# Patient Record
Sex: FEMALE | Race: WHITE | NOT HISPANIC OR LATINO | Employment: OTHER | URBAN - METROPOLITAN AREA
[De-identification: names, ages, dates, MRNs, and addresses within clinical notes are randomized per-mention and may not be internally consistent; named-entity substitution may affect disease eponyms.]

---

## 2017-03-14 DIAGNOSIS — F41.9 ANXIETY: ICD-10-CM

## 2017-03-14 RX ORDER — LORAZEPAM 0.5 MG/1
0.5 TABLET ORAL
Qty: 30 TAB | Refills: 2 | Status: SHIPPED
Start: 2017-03-14 | End: 2019-04-24

## 2017-03-14 RX ORDER — LEVOTHYROXINE SODIUM 0.15 MG/1
150 TABLET ORAL
Qty: 90 TAB | Refills: 3 | Status: SHIPPED | OUTPATIENT
Start: 2017-03-14 | End: 2018-02-26 | Stop reason: SDUPTHER

## 2017-03-14 NOTE — TELEPHONE ENCOUNTER
Was the patient seen in the last year in this department? Yes     Does patient have an active prescription for medications requested? No     Received Request Via: Patient     Last seen: 11/14/2016 Slots     Patient instructed to make a follow up appointment and conduct lab work

## 2017-03-23 ENCOUNTER — PATIENT OUTREACH (OUTPATIENT)
Dept: HEALTH INFORMATION MANAGEMENT | Facility: OTHER | Age: 77
End: 2017-03-23

## 2017-03-23 NOTE — PROGRESS NOTES
Attempt #:1    Verify PCP: yes    Communication Preference Obtained: yes     Annual Wellness Visit Scheduling  1. Scheduling Status:Not Scheduled. Patient states they are not interested       Care Gap Scheduling (Attempt to Schedule EACH Overdue Care Gap!)- WOULD NOT DISCUSS ANYTHING, HESITANT TO VERIFY BDAY.     Health Maintenance Due   Topic Date Due   • COLON CANCER SCREENING ANNUAL FIT  SAID SHE WILL DISCUSS WITH PCP   • Annual Wellness Visit  DECLINED   • IMM DTaP/Tdap/Td Vaccine (1 - Tdap) SCHEDULED   • BONE DENSITY  SAID SHE WILL DISCUSS WITH PCP   • MAMMOGRAM  SAID SHE WILL DISCUSS WITH PCP         [employer] [facility]: already active  [facility] Madhuri: no  Virtual Visits: no  Opt In to Text Messages: no

## 2017-04-20 ENCOUNTER — TELEPHONE (OUTPATIENT)
Dept: MEDICAL GROUP | Facility: MEDICAL CENTER | Age: 77
End: 2017-04-20

## 2017-04-20 NOTE — TELEPHONE ENCOUNTER
Spoke to [facility], they states they never received this Rx in March and it has not been filled, they processed over the phone.

## 2017-04-20 NOTE — TELEPHONE ENCOUNTER
Spoke with patient by phone initially regarding concerns with her 's health.  She then states that she is feeling very stressed by the situation, she requests refill of Ativan which she has been given to use on occasion.  Medication list shows prescription filled in March with 2 refills, she should have refills available at pharmacy. Please call and ask pharmacy to fill

## 2017-05-18 ENCOUNTER — TELEPHONE (OUTPATIENT)
Dept: MEDICAL GROUP | Facility: MEDICAL CENTER | Age: 77
End: 2017-05-18

## 2017-05-18 NOTE — TELEPHONE ENCOUNTER
Patient is taking zoloft 50 mg daily for 2 weeks and feeling improved.  Refill sent for Zoloft.  Flavio Cooper M.D.

## 2017-06-23 RX ORDER — BUTALBITAL, ACETAMINOPHEN AND CAFFEINE 50; 325; 40 MG/1; MG/1; MG/1
TABLET ORAL
Qty: 60 TAB | Refills: 5 | Status: SHIPPED
Start: 2017-06-23 | End: 2018-03-12 | Stop reason: SDUPTHER

## 2017-06-28 ENCOUNTER — TELEPHONE (OUTPATIENT)
Dept: MEDICAL GROUP | Facility: MEDICAL CENTER | Age: 77
End: 2017-06-28

## 2017-06-28 NOTE — TELEPHONE ENCOUNTER
1. Caller Name: [facility]                      Call Back Number: [phone]    2. Message: Received a request for refill of: C-liothronine E4M 10mcg  Please advise not listed in pt medication list     3. Patient approves office to leave a detailed voicemail/MyChart message: yes

## 2017-07-03 ENCOUNTER — TELEPHONE (OUTPATIENT)
Dept: MEDICAL GROUP | Facility: MEDICAL CENTER | Age: 77
End: 2017-07-03

## 2017-07-03 NOTE — TELEPHONE ENCOUNTER
1. Caller Name: Pt                      Call Back Number: [phone]    2. Message: Pt called stating she had issues with Zoloft as it caused her stomach issues. She is now taking Prozac that is her 's medication. It is agreeing well with her and she would like her own rx for it. She would like this sent to [facility].     3. Patient approves office to leave a detailed voicemail/MyChart message: yes

## 2017-07-06 RX ORDER — FLUOXETINE 10 MG/1
10 CAPSULE ORAL DAILY
Qty: 90 CAP | Refills: 3 | Status: SHIPPED | OUTPATIENT
Start: 2017-07-06 | End: 2018-05-01

## 2017-07-06 NOTE — TELEPHONE ENCOUNTER
Prescription for Prozac 10 mg sent to [facility].  Please remind patient that she has not had her stool test done for colon cancer screening or her mammogram done for breast cancer screening. Both tests were ordered in November.

## 2017-07-13 ENCOUNTER — TELEPHONE (OUTPATIENT)
Dept: MEDICAL GROUP | Facility: MEDICAL CENTER | Age: 77
End: 2017-07-13

## 2017-07-13 DIAGNOSIS — E03.9 HYPOTHYROIDISM, UNSPECIFIED TYPE: ICD-10-CM

## 2017-07-13 DIAGNOSIS — E78.00 PURE HYPERCHOLESTEROLEMIA: ICD-10-CM

## 2017-07-13 NOTE — TELEPHONE ENCOUNTER
1. Caller Name: Afsaneh Lyn                                         Call Back Number: [phone]       Patient approves a detailed voicemail message: yes    Pt called and states she had a reaction after taking a new compounded medication. Pt called the pharmacy and was told this is very rare to have a reaction to this medication. Pt asking if this could be caused by taking her other medication during the new medication and would like more information. Pt offered same day apt with Solange Corky at 4:00 but just took a Benadryl and would like a return call instead.

## 2017-07-13 NOTE — TELEPHONE ENCOUNTER
Called patient.  She took compounded thyroid medication, which she has before, but has been off for the last 6 weeks. This morning it was her first dose in 6 weeks.  She felt flushed, had a bowel movement, and felt very anxious. Symptoms improved with benadryl.  Advised patient to have blood work done in one week, we will check thyroid to ensure that levothyroxine 150 µg daily is effective at controlling her thyroid function.    Flavio Cooper M.D.

## 2017-08-25 ENCOUNTER — TELEPHONE (OUTPATIENT)
Dept: MEDICAL GROUP | Facility: MEDICAL CENTER | Age: 77
End: 2017-08-25

## 2017-08-25 NOTE — TELEPHONE ENCOUNTER
Contacted pharmacy prescription was placed for weekly but sig states twice a week. Patch comes in weekly and twice a week. Clarify which you would like to order

## 2017-08-25 NOTE — TELEPHONE ENCOUNTER
1. Caller Name: [facility]                      Call Back Number:     2. Message: Pharmacy called and states the prescription for estradiol (CLIMARA) 0.025 MG/24HR PATCH WEEKLY needs to be changed. SIG for medication states APPLY 1 PATCH  TWICE A WEEK. Please change sig.     3. Patient approves office to leave a detailed voicemail/MyChart message: yes

## 2017-09-01 RX ORDER — ESTRADIOL 0.03 MG/D
FILM, EXTENDED RELEASE TRANSDERMAL
Qty: 24 PATCH | Refills: 2 | Status: SHIPPED | OUTPATIENT
Start: 2017-09-01 | End: 2018-08-17

## 2017-09-01 NOTE — TELEPHONE ENCOUNTER
Not received   Was the patient seen in the last year in this department? Yes     Does patient have an active prescription for medications requested? No     Received Request Via: Pharmacy

## 2017-12-02 ENCOUNTER — OFFICE VISIT (OUTPATIENT)
Dept: URGENT CARE | Facility: CLINIC | Age: 77
End: 2017-12-02
Payer: MEDICARE

## 2017-12-02 ENCOUNTER — APPOINTMENT (OUTPATIENT)
Dept: RADIOLOGY | Facility: IMAGING CENTER | Age: 77
End: 2017-12-02
Attending: PHYSICIAN ASSISTANT
Payer: MEDICARE

## 2017-12-02 VITALS
SYSTOLIC BLOOD PRESSURE: 160 MMHG | DIASTOLIC BLOOD PRESSURE: 100 MMHG | OXYGEN SATURATION: 98 % | WEIGHT: 124 LBS | HEART RATE: 66 BPM | BODY MASS INDEX: 19.46 KG/M2 | TEMPERATURE: 97.7 F | RESPIRATION RATE: 16 BRPM | HEIGHT: 67 IN

## 2017-12-02 DIAGNOSIS — M25.532 LEFT WRIST PAIN: ICD-10-CM

## 2017-12-02 DIAGNOSIS — M11.20 PSEUDOGOUT: ICD-10-CM

## 2017-12-02 PROCEDURE — 99214 OFFICE O/P EST MOD 30 MIN: CPT | Performed by: PHYSICIAN ASSISTANT

## 2017-12-02 PROCEDURE — 73110 X-RAY EXAM OF WRIST: CPT | Mod: TC,LT | Performed by: PHYSICIAN ASSISTANT

## 2017-12-02 RX ORDER — PREDNISONE 20 MG/1
TABLET ORAL
Qty: 10 TAB | Refills: 0 | Status: SHIPPED | OUTPATIENT
Start: 2017-12-02 | End: 2017-12-02

## 2017-12-02 RX ORDER — KETOROLAC TROMETHAMINE 30 MG/ML
30 INJECTION, SOLUTION INTRAMUSCULAR; INTRAVENOUS ONCE
Status: COMPLETED | OUTPATIENT
Start: 2017-12-02 | End: 2017-12-02

## 2017-12-02 RX ORDER — PREDNISONE 20 MG/1
TABLET ORAL
Qty: 10 TAB | Refills: 0 | Status: SHIPPED | OUTPATIENT
Start: 2017-12-02 | End: 2019-04-24

## 2017-12-02 RX ORDER — ACETAMINOPHEN AND CODEINE PHOSPHATE 300; 30 MG/1; MG/1
1 TABLET ORAL EVERY 6 HOURS PRN
Qty: 20 TAB | Refills: 0 | Status: SHIPPED | OUTPATIENT
Start: 2017-12-02 | End: 2019-04-24

## 2017-12-02 RX ADMIN — KETOROLAC TROMETHAMINE 30 MG: 30 INJECTION, SOLUTION INTRAMUSCULAR; INTRAVENOUS at 13:47

## 2017-12-02 ASSESSMENT — ENCOUNTER SYMPTOMS
TINGLING: 0
NUMBNESS: 0
MUSCLE WEAKNESS: 0

## 2017-12-02 NOTE — PROGRESS NOTES
"Subjective:      Afsaneh Lyn is a 77 y.o. female who presents with Arm Pain (left arm pain x 1 month)            Arm Pain    The incident occurred more than 1 week ago. Incident location: no injury. noticed at work. There was no injury mechanism. Pain location: left wrist. The quality of the pain is described as aching and burning. The pain does not radiate. The pain is at a severity of 8/10. The pain is moderate. The pain has been worsening since the incident. Pertinent negatives include no chest pain, muscle weakness, numbness or tingling. The symptoms are aggravated by movement and lifting. She has tried ice and rest for the symptoms. The treatment provided mild relief.       Review of Systems   Cardiovascular: Negative for chest pain.   Neurological: Negative for tingling and numbness.          Objective:     /100   Pulse 66   Temp 36.5 °C (97.7 °F)   Resp 16   Ht 1.702 m (5' 7\")   Wt 56.2 kg (124 lb)   SpO2 98%   BMI 19.42 kg/m²      Physical Exam       Left upper extremity. No abnormalities noted in the clavicular, shoulder or elbow region. Patient has exquisite tenderness in the left wrist region. She is unable to flex and extend without significant pain. She has significant redness and warmth and tenderness and swelling in the volar aspect of her distal wrist. It is very hot. Her fingers are not affected.  Neurovascularly she has a decreased  strength to 1+. Normal sensation and good cap refill    Urgent care course x-ray of the left wrist was done was negative for fracture, But did show signs suggestive of CPPD, calcium pyrophosphate crystals deposition Toradol 30 mg IM ×1   Assessment/Plan:     1. Left wrist pain  -patient's symptoms Seem consistent with a gouty arthropathy or possibly even autoimmune arthritic Issue. Recommend ice, rest, compression with a wrist brace. I will put her on anti-inflammatories and have her follow-up  - ketorolac (TORADOL) injection 30 mg; 1 mL by " Intramuscular route Once.  - DX-WRIST-COMPLETE 3+ LEFT; Future    2. Pseudogout  As noted patient's x-ray was slightly suggestive of calcium pyrophosphate Crystal disposition. I was going to put her on indomethacin but patient cannot tolerate NSAIDs and states they tear up her stomach. Therefore I will put her on a five-day course of prednisone  -Prednisone 20 mg twice a day ×5 day  I will also give her Tylenol No. 3 to take for breakthrough pain. She is instructed to wear her wrist brace and ice the area and to follow-up with primary care for further evaluation-

## 2017-12-04 ENCOUNTER — OFFICE VISIT (OUTPATIENT)
Dept: MEDICAL GROUP | Facility: MEDICAL CENTER | Age: 77
End: 2017-12-04
Payer: MEDICARE

## 2017-12-04 ENCOUNTER — HOSPITAL ENCOUNTER (OUTPATIENT)
Dept: LAB | Facility: MEDICAL CENTER | Age: 77
End: 2017-12-04
Attending: NURSE PRACTITIONER
Payer: MEDICARE

## 2017-12-04 VITALS
OXYGEN SATURATION: 97 % | RESPIRATION RATE: 16 BRPM | HEIGHT: 67 IN | BODY MASS INDEX: 18.68 KG/M2 | DIASTOLIC BLOOD PRESSURE: 88 MMHG | WEIGHT: 119 LBS | TEMPERATURE: 98 F | HEART RATE: 82 BPM | SYSTOLIC BLOOD PRESSURE: 148 MMHG

## 2017-12-04 DIAGNOSIS — F33.41 RECURRENT MAJOR DEPRESSIVE DISORDER, IN PARTIAL REMISSION (HCC): ICD-10-CM

## 2017-12-04 DIAGNOSIS — M25.532 LEFT WRIST PAIN: ICD-10-CM

## 2017-12-04 LAB
ERYTHROCYTE [SEDIMENTATION RATE] IN BLOOD BY WESTERGREN METHOD: 49 MM/HOUR (ref 0–30)
URATE SERPL-MCNC: 3.8 MG/DL (ref 1.9–8.2)

## 2017-12-04 PROCEDURE — 85652 RBC SED RATE AUTOMATED: CPT

## 2017-12-04 PROCEDURE — 86200 CCP ANTIBODY: CPT

## 2017-12-04 PROCEDURE — 36415 COLL VENOUS BLD VENIPUNCTURE: CPT

## 2017-12-04 PROCEDURE — 99214 OFFICE O/P EST MOD 30 MIN: CPT | Performed by: NURSE PRACTITIONER

## 2017-12-04 PROCEDURE — 84550 ASSAY OF BLOOD/URIC ACID: CPT

## 2017-12-04 RX ORDER — FLUOXETINE HYDROCHLORIDE 20 MG/1
20 CAPSULE ORAL DAILY
Qty: 90 CAP | Refills: 1 | Status: SHIPPED | OUTPATIENT
Start: 2017-12-04 | End: 2018-05-01 | Stop reason: SDUPTHER

## 2017-12-04 ASSESSMENT — PAIN SCALES - GENERAL: PAINLEVEL: 10=SEVERE PAIN

## 2017-12-04 NOTE — PROGRESS NOTES
Subjective:     Chief Complaint   Patient presents with   • Wrist Pain     Afsaneh Lyn is a 77 y.o. 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  Patient first began noticing pain in left wrist approximately a month ago which she attributed to repetitive motion associated with opening and closing the peterson of her car and heavy lifting at work.  Patient began to use right arm more and utilizing a brace which provided some symptom relief.  This past Friday swelling and pain became unbearable and patient was seen in UC.  Imaging consistent with pseudogout process and patient was treated with IM toradol and prednisone.  Today patient reports decreased pain at 4/10 from 10/10 previously and decreased swelling.    She has concerns about what could be causing her persistent symptoms.  Patient can not recall a precipitating event associated with symptoms either injury or diet.  She is vegetarian, eats fish occasionally but usually halibut or salmon, and denies much alcohol consumption.  Patient denies family history of gout, RA  No numbness, tingling in the hand. No fevers. Tolerating prednisone without nausea, abd pain       Current medicines (including changes today)  Current Outpatient Prescriptions   Medication Sig Dispense Refill   • fluoxetine (PROZAC) 20 MG Cap Take 1 Cap by mouth every day. 90 Cap 1   • fluoxetine (PROZAC) 10 MG Cap Take 1 Cap by mouth every day. 90 Cap 3   • levothyroxine (SYNTHROID) 150 MCG Tab Take 1 Tab by mouth every morning before breakfast. ON EMPTY  "STOMACH 90 Tab 3   • estradiol (CLIMARA) 0.025 MG/24HR PATCH WEEKLY APPLY 1 PATCH TOPICALLY ONCE A WEEK 12 Patch 3   • Acetaminophen-Codeine 300-30 MG Tab Take 1 Tab by mouth every 6 hours as needed (severe pain). 20 Tab 0   • predniSONE (DELTASONE) 20 MG Tab Take one pill twice a day for five days 10 Tab 0   • Estradiol 0.025 MG/24HR PATCH BIWEEKLY APPLY ONE PATCH TO CLEAN DRY AREA OF SKIN TWICE A WEEK 24 Patch 2   • estradiol (CLIMARA) 0.025 MG/24HR PATCH WEEKLY APPLY 1 PATCH TO A CLEAN DRY AREA OF THE SKIN ONCE A WEEK 12 Patch 3   • acetaminophen/caffeine/butalbital 325-40-50 mg (FIORICET) -40 MG Tab TAKE 1 TAB BY MOUTH TWICEDAILY AS NEEDED FOR HEADACHE 60 Tab 5   • lorazepam (ATIVAN) 0.5 MG Tab Take 1 Tab by mouth 1 time daily as needed for Anxiety. 30 Tab 2   • Dextromethorphan-Guaifenesin (ROBITUSSIN DM PO) Take  by mouth.     • Djzdowlby-DAU-QM-Aspirin (VANITA-SELTZER PLUS COLD & COUGH PO) Take  by mouth.     • guaifenesin-codeine (TUSSI-ORGANIDIN NR) 100-10 MG/5ML syrup Take 5 mL by mouth 3 times a day as needed for Cough. 120 mL 0   • Aspirin-Acetaminophen-Caffeine (EXCEDRIN PO) Take  by mouth. Last dose 3 pm        No current facility-administered medications for this visit.      She  has a past medical history of Diverticulosis; Hypothyroid; IBS (irritable bowel syndrome); Migraines; and Mitral valve prolapse. She also has no past medical history of Breast cancer (CMS-[facility]).    ROS included above     Objective:     Blood pressure 148/88, pulse 82, temperature 36.7 °C (98 °F), resp. rate 16, height 1.702 m (5' 7.01\"), weight 54 kg (119 lb), SpO2 97 %. Body mass index is 18.63 kg/m².     Physical Exam:  General: Alert, oriented in no acute distress.  Eye contact is good, speech is normal, affect calm  Lungs: clear to auscultation bilaterally, normal effort, no wheeze/ rhonchi/ rales.  CV: regular rate and rhythm, S1, S2, no murmur  MS: mildly tender over medial wrist joint, slight swelling over palmar " and plantar joint. No color change. ROM intact  Ext: no edema, color normal, vascularity normal, temperature normal    Assessment and Plan:   The following treatment plan was discussed   1. Left wrist pain  Left wrist pain and swelling without h/o injury. Seen in urgent care, started prednisone and improving. Xray raising concern for gout or inflammatory process. Labs as listed below. Advised to continue prednisone course, take medication with food. Continue with brace and avoid overuse. Follow up pending labs  URIC ACID    RHEUMATOID ARTHRITIS FACTOR    WESTERGREN SED RATE    CCP ANTIBODY   2. Recurrent major depressive disorder, in partial remission (CMS-HCC)  Requesting increased dose of prozac. Will go to 20 mg daily. Advised f/u with Dr. Cooper in 1 month.  fluoxetine (PROZAC) 20 MG Cap       Followup: 1 month, sooner as needed         Please note that this dictation was created using voice recognition software. I have worked with consultants from the vendor as well as technical experts from [facility] to optimize the interface. I have made every reasonable attempt to correct obvious errors, but I expect that there are errors of grammar and possibly content that I did not discover before finalizing the note.

## 2017-12-04 NOTE — ASSESSMENT & PLAN NOTE
Patient seen today with complaints of persistent pain and swelling in her left wrist.  Patient first began noticing pain in left wrist approximately a month ago which she attributed to repetitive motion associated with opening and closing the peterson of her car and heavy lifting at work.  Patient began to use right arm more and utilizing a brace which provided some symptom relief.  This past Friday swelling and pain became unbearable and patient was seen in .  Imaging consistent with pseudogout process and patient was treated with IM toradol and prednisone.  Today patient reports decreased pain at 4/10 from 10/10 previously and decreased swelling.    She has concerns about what could be causing her persistent symptoms.  Patient can not recall a precipitating event associated with symptoms either injury or diet.  She is vegetarian, eats fish occasionally but usually halibut or salmon, and denies much alcohol consumption.  Patient denies family history of gout.

## 2017-12-04 NOTE — ASSESSMENT & PLAN NOTE
More stress in the last year with her  having suffered from stroke  She feels that she is generally doing well on Prozac 10 mg daily but is requesting slightly increased dose, thinks she will be able to cope with stressors better  No appetite changes, insomnia, hypersomnia

## 2017-12-05 LAB — CCP IGG SERPL-ACNC: 12 UNITS (ref 0–19)

## 2017-12-06 ENCOUNTER — TELEPHONE (OUTPATIENT)
Dept: MEDICAL GROUP | Facility: MEDICAL CENTER | Age: 77
End: 2017-12-06

## 2017-12-06 NOTE — TELEPHONE ENCOUNTER
----- Message from Flavio Cooper M.D. sent at 12/6/2017  1:41 PM PST -----  Please notify patient that she does not have any evidence of rheumatoid arthritis or gout.  Her general inflammatory marker was slightly elevated, this is consistent with inflammation from osteoarthritis, which is a wearing down of the joints that naturally occurs with time.  Flavio Cooper M.D.

## 2018-02-26 RX ORDER — LEVOTHYROXINE SODIUM 0.15 MG/1
TABLET ORAL
Qty: 90 TAB | Refills: 0 | Status: SHIPPED | OUTPATIENT
Start: 2018-02-26 | End: 2018-04-27 | Stop reason: SDUPTHER

## 2018-02-26 NOTE — LETTER
February 26, 2018        Afsaneh Lyn  [address] [place] [address] [address]  [facility] [address]        Dear Afsaneh:    We recently received a medication refill request form your pharmacy for your medication. We have refilled this medication and has sent it to your pharmacy.  Dr. Cooper  would like for you to make a Annual appointment  to receive further refills.     If you have any questions or to schedule please feel free to contact us at [phone].    Thank you  Shy         Electronically Signed

## 2018-02-26 NOTE — TELEPHONE ENCOUNTER
Refill done. Patient is due for annual appointment. Please have patient schedule.  Flavio Cooper M.D.

## 2018-04-27 RX ORDER — LEVOTHYROXINE SODIUM 0.15 MG/1
TABLET ORAL
Qty: 90 TAB | Refills: 2 | Status: SHIPPED | OUTPATIENT
Start: 2018-04-27 | End: 2019-01-17 | Stop reason: SDUPTHER

## 2018-05-01 DIAGNOSIS — F33.41 RECURRENT MAJOR DEPRESSIVE DISORDER, IN PARTIAL REMISSION (HCC): ICD-10-CM

## 2018-05-01 RX ORDER — FLUOXETINE HYDROCHLORIDE 20 MG/1
20 CAPSULE ORAL DAILY
Qty: 90 CAP | Refills: 3 | Status: SHIPPED | OUTPATIENT
Start: 2018-05-01 | End: 2019-03-17 | Stop reason: SDUPTHER

## 2018-07-31 DIAGNOSIS — G44.229 CHRONIC TENSION-TYPE HEADACHE, NOT INTRACTABLE: ICD-10-CM

## 2018-07-31 RX ORDER — BUTALBITAL, ACETAMINOPHEN AND CAFFEINE 50; 325; 40 MG/1; MG/1; MG/1
1 TABLET ORAL 2 TIMES DAILY PRN
Qty: 60 TAB | Refills: 4 | Status: SHIPPED
Start: 2018-07-31 | End: 2019-05-23 | Stop reason: SDUPTHER

## 2018-10-05 ENCOUNTER — APPOINTMENT (RX ONLY)
Dept: URBAN - METROPOLITAN AREA CLINIC 35 | Facility: CLINIC | Age: 78
Setting detail: DERMATOLOGY
End: 2018-10-05

## 2018-10-05 DIAGNOSIS — Z41.9 ENCOUNTER FOR PROCEDURE FOR PURPOSES OTHER THAN REMEDYING HEALTH STATE, UNSPECIFIED: ICD-10-CM

## 2018-10-05 PROCEDURE — ? FILLERS

## 2018-10-05 NOTE — PROCEDURE: FILLERS
Post-Care Instructions: Patient instructed to apply ice to reduce swelling.
Tear Troughs Filler Volume In Cc: 0
Vermilion Lips Filler Volume In Cc: 0.4
Use Map Statement For Sites (Optional): No
Additional Area 1 Location: Oral Commissures
Price (Use Numbers Only, No Special Characters Or $): 218
Map Statment: See Attach Map for Complete Details
Filler Comments: (0.2cc upper lip, 0.2cc lower lip and 0.2cc per side in cheeks diluted 1:1 with lidocaine no epinephrine)
Additional Area 1 Volume In Cc: 0.2
Filler: Juvederm Ultra XC
Expiration Date (Month Year): 2019/08/03
Consent: Written consent obtained. Risks include but not limited to bruising, beading, irregular texture, ulceration, infection, allergic reaction, scar formation, incomplete augmentation, temporary nature, procedural pain.
Detail Level: Detailed
Lot #: [id number]

## 2018-10-25 ENCOUNTER — APPOINTMENT (RX ONLY)
Dept: URBAN - METROPOLITAN AREA CLINIC 35 | Facility: CLINIC | Age: 78
Setting detail: DERMATOLOGY
End: 2018-10-25

## 2018-10-25 DIAGNOSIS — Z41.9 ENCOUNTER FOR PROCEDURE FOR PURPOSES OTHER THAN REMEDYING HEALTH STATE, UNSPECIFIED: ICD-10-CM

## 2018-10-25 PROCEDURE — ? ADDITIONAL NOTES

## 2018-10-25 PROCEDURE — ? BOTOX

## 2018-10-25 NOTE — PROCEDURE: BOTOX
Lot #: [id number]
Additional Area 6 Location: platysma
Additional Area 5 Location: perioral
Inferior Lateral Orbicularis Oculi Units: 0
Expiration Date (Month Year): 06/2020
Detail Level: Detailed
Price (Use Numbers Only, No Special Characters Or $): 267
Additional Area 3 Location: [place]
Post-Care Instructions: Patient instructed to not lie down for 4 hours after injections and limit physical activity for 24 hours.
Additional Area 2 Units: 34
Additional Area 4 Location: Bunny lines
Consent: Written consent obtained. Risks include but not limited to lid/brow ptosis or drooping, bruising, swelling, diplopia/double vision, temporary effect, incomplete chemical denervation/relaxation of muscles.
Reconstitution Date (Optional): 10/25/2018
Dilution (U/0.1 Cc): 5
Additional Area 3 Units: 16
Additional Area 1 Location: [place]
Additional Area 2 Location: [place]

## 2018-11-08 ENCOUNTER — APPOINTMENT (RX ONLY)
Dept: URBAN - METROPOLITAN AREA CLINIC 35 | Facility: CLINIC | Age: 78
Setting detail: DERMATOLOGY
End: 2018-11-08

## 2018-11-08 DIAGNOSIS — Z41.9 ENCOUNTER FOR PROCEDURE FOR PURPOSES OTHER THAN REMEDYING HEALTH STATE, UNSPECIFIED: ICD-10-CM

## 2018-11-08 PROCEDURE — ? ADDITIONAL NOTES

## 2018-11-08 PROCEDURE — ? FILLERS

## 2018-11-08 PROCEDURE — ? BOTOX

## 2018-11-08 NOTE — PROCEDURE: BOTOX
Additional Area 5 Location: perioral
Additional Area 4 Location: Bunny lines
Dilution (U/0.1 Cc): 5
Additional Area 3 Units: 16
Depressor Anguli Oris Units: 0
Detail Level: Detailed
Additional Area 2 Location: [place]
Expiration Date (Month Year): 10/2020
Reconstitution Date (Optional): 11/8/2018
Price (Use Numbers Only, No Special Characters Or $): 677
Lot #: [id number]
Consent: Written consent obtained. Risks include but not limited to lid/brow ptosis or drooping, bruising, swelling, diplopia/double vision, temporary effect, incomplete chemical denervation/relaxation of muscles.
Additional Area 3 Location: [place]
Additional Area 6 Location: platysma
Additional Area 1 Location: [place]
Additional Area 2 Units: 34
Post-Care Instructions: Patient instructed to not lie down for 4 hours after injections and limit physical activity for 24 hours.

## 2018-11-08 NOTE — PROCEDURE: FILLERS
Temple Hollows Filler Volume In Cc: 0
Detail Level: Detailed
Include Cannula Information In Note?: No
Consent: Written consent obtained. Risks include but not limited to bruising, beading, irregular texture, ulceration, infection, allergic reaction, scar formation, incomplete augmentation, temporary nature, procedural pain.
Cheeks Filler Volume In Cc: 1
Lot #: [id number]
Filler: Juvederm Ultra XC
Vermilion Lips Filler Volume In Cc: 0.4
Additional Area 1 Volume In Cc: 0.3
Post-Care Instructions: Patient instructed to apply ice to reduce swelling.
Map Statment: See Attach Map for Complete Details
Price (Use Numbers Only, No Special Characters Or $): 1400
Use Map Statement For Sites (Optional): Yes
Additional Anesthesia Volume In Cc: 6
Additional Area 1 Location: oral commissures
Filler: Juvederm Voluma XC
Expiration Date (Month Year): 2019.10.02

## 2019-01-15 ENCOUNTER — APPOINTMENT (RX ONLY)
Dept: URBAN - METROPOLITAN AREA CLINIC 35 | Facility: CLINIC | Age: 79
Setting detail: DERMATOLOGY
End: 2019-01-15

## 2019-01-15 PROBLEM — D48.5 NEOPLASM OF UNCERTAIN BEHAVIOR OF SKIN: Status: ACTIVE | Noted: 2019-01-15

## 2019-01-15 PROCEDURE — 11102 TANGNTL BX SKIN SINGLE LES: CPT

## 2019-01-15 PROCEDURE — ? BIOPSY BY SHAVE METHOD

## 2019-01-15 PROCEDURE — 11103 TANGNTL BX SKIN EA SEP/ADDL: CPT

## 2019-01-15 NOTE — PROCEDURE: BIOPSY BY SHAVE METHOD
Billing Type: Third-Party Bill
Biopsy Method: Dermablade
Post-Care Instructions: I reviewed with the patient in detail post-care instructions. Patient is to keep the biopsy site dry overnight, and then apply white petrolatum twice daily until healed. Patient may apply hydrogen peroxide soaks to remove any crusting.
Silver Nitrate Text: The wound bed was treated with silver nitrate after the biopsy was performed.
Curettage Text: The wound bed was treated with curettage after the biopsy was performed.
Anesthesia Type: 1% lidocaine with 1:200,000 epinephrine
Notification Instructions: Patient will be notified of biopsy results. However, patient instructed to call the office if not contacted within 2 weeks.
Destruction After The Procedure: No
Lab: [id number]
Additional Anesthesia Volume In Cc (Will Not Render If 0): 0
Cryotherapy Text: The wound bed was treated with cryotherapy after the biopsy was performed.
Wound Care: Petrolatum
Consent: Written consent was obtained and risks were reviewed including but not limited to scarring, infection, bleeding, scabbing, incomplete removal, nerve damage and allergy to anesthesia.
Lab Facility: 
Depth Of Biopsy: dermis
Electrodesiccation Text: The wound bed was treated with electrodesiccation after the biopsy was performed.
Anesthesia Volume In Cc: 2
Biopsy Type: H and E
Render Post-Care Instructions In Note?: yes
Type Of Destruction Used: Curettage
Dressing: pressure dressing
Electrodesiccation And Curettage Text: The wound bed was treated with electrodesiccation and curettage after the biopsy was performed.
Detail Level: Detailed
Hemostasis: Aluminum Chloride

## 2019-01-17 RX ORDER — LEVOTHYROXINE SODIUM 0.15 MG/1
TABLET ORAL
Qty: 90 TAB | Refills: 0 | Status: SHIPPED | OUTPATIENT
Start: 2019-01-17 | End: 2019-05-23 | Stop reason: SDUPTHER

## 2019-01-31 ENCOUNTER — APPOINTMENT (RX ONLY)
Dept: URBAN - METROPOLITAN AREA CLINIC 35 | Facility: CLINIC | Age: 79
Setting detail: DERMATOLOGY
End: 2019-01-31

## 2019-01-31 DIAGNOSIS — Z41.9 ENCOUNTER FOR PROCEDURE FOR PURPOSES OTHER THAN REMEDYING HEALTH STATE, UNSPECIFIED: ICD-10-CM

## 2019-01-31 PROCEDURE — ? FILLERS

## 2019-01-31 NOTE — PROCEDURE: FILLERS
Additional Area 1 Volume In Cc: 0.2
Include Cannula Information In Note?: No
Additional Area 2 Volume In Cc: 0
Map Statment: See Attach Map for Complete Details
Additional Area 2 Location: Border of lips
Additional Area 4 Location: Scars
Additional Area 1 Location: Oral Commisures
Additional Area 5 Location: Earlobes
Post-Care Instructions: Patient instructed to apply ice to reduce swelling.
Additional Area 3 Location: Fine lines around mouth
Expiration Date (Month Year): 2019.11.26
Detail Level: Detailed
Vermilion Lips Filler Volume In Cc: 0.8
Consent: Written consent obtained. Risks include but not limited to bruising, beading, irregular texture, ulceration, infection, allergic reaction, scar formation, incomplete augmentation, temporary nature, procedural pain.
Price (Use Numbers Only, No Special Characters Or $): 956
Use Map Statement For Sites (Optional): Yes
Filler: Juvederm Ultra XC
Lot #: [id number]

## 2019-02-01 ENCOUNTER — APPOINTMENT (RX ONLY)
Dept: URBAN - METROPOLITAN AREA CLINIC 35 | Facility: CLINIC | Age: 79
Setting detail: DERMATOLOGY
End: 2019-02-01

## 2019-02-01 DIAGNOSIS — L57.0 ACTINIC KERATOSIS: ICD-10-CM

## 2019-02-01 PROBLEM — D48.5 NEOPLASM OF UNCERTAIN BEHAVIOR OF SKIN: Status: ACTIVE | Noted: 2019-02-01

## 2019-02-01 PROBLEM — C44.722 SQUAMOUS CELL CARCINOMA OF SKIN OF RIGHT LOWER LIMB, INCLUDING HIP: Status: ACTIVE | Noted: 2019-02-01

## 2019-02-01 PROCEDURE — ? CURETTAGE AND DESTRUCTION

## 2019-02-01 PROCEDURE — ? BIOPSY BY SHAVE METHOD

## 2019-02-01 PROCEDURE — ? MONITORING

## 2019-02-01 PROCEDURE — 17262 DSTRJ MAL LES T/A/L 1.1-2.0: CPT

## 2019-02-01 PROCEDURE — ? COUNSELING

## 2019-02-01 PROCEDURE — 99212 OFFICE O/P EST SF 10 MIN: CPT | Mod: 25

## 2019-02-01 PROCEDURE — 11102 TANGNTL BX SKIN SINGLE LES: CPT | Mod: 59

## 2019-02-01 ASSESSMENT — LOCATION ZONE DERM: LOCATION ZONE: LEG

## 2019-02-01 ASSESSMENT — LOCATION DETAILED DESCRIPTION DERM: LOCATION DETAILED: LEFT DISTAL PRETIBIAL REGION

## 2019-02-01 ASSESSMENT — LOCATION SIMPLE DESCRIPTION DERM: LOCATION SIMPLE: LEFT PRETIBIAL REGION

## 2019-02-01 NOTE — PROCEDURE: BIOPSY BY SHAVE METHOD
Consent: Written consent was obtained and risks were reviewed including but not limited to scarring, infection, bleeding, scabbing, incomplete removal, nerve damage and allergy to anesthesia.
Detail Level: Detailed
Notification Instructions: Patient will be notified of biopsy results. However, patient instructed to call the office if not contacted within 2 weeks.
Was A Bandage Applied: Yes
Anesthesia Volume In Cc: 0.5
Bill For Surgical Tray: no
Lab Facility: 
Type Of Destruction Used: Curettage
Electrodesiccation Text: The wound bed was treated with electrodesiccation after the biopsy was performed.
Biopsy Method: Dermablade
Electrodesiccation And Curettage Text: The wound bed was treated with electrodesiccation and curettage after the biopsy was performed.
Hemostasis: Aluminum Chloride
Dressing: no dressing applied
Anesthesia Type: 1% lidocaine with 1:100,000 epinephrine and a 1:10 solution of 8.4% sodium bicarbonate
Post-Care Instructions: I reviewed with the patient in detail post-care instructions. Patient is to keep the biopsy site dry overnight, and then apply white petrolatum twice daily until healed. Patient may apply hydrogen peroxide soaks to remove any crusting.
Curettage Text: The wound bed was treated with curettage after the biopsy was performed.
Billing Type: Third-Party Bill
Size Of Lesion In Cm: 0
Silver Nitrate Text: The wound bed was treated with silver nitrate after the biopsy was performed.
Depth Of Biopsy: dermis
Cryotherapy Text: The wound bed was treated with cryotherapy after the biopsy was performed.
Lab: [id number]
Wound Care: Petrolatum
Biopsy Type: H and E

## 2019-02-01 NOTE — PROCEDURE: CURETTAGE AND DESTRUCTION
Cautery Type: electrodesiccation
Add Ability To Document Additional Intralesional Injection: No
Size Of Lesion In Cm: 1
Number Of Curettages: 3
Post-Care Instructions: I reviewed with the patient in detail post-care instructions. Patient is to keep the area dry for 24 hours, and not to engage in any swimming until the area is healed. Should the patient develop any fevers, chills, bleeding, severe pain patient will contact the office immediately.
Render Post-Care Instructions In Note?: yes
Bill As A Line Item Or As Units: Line Item
Consent was obtained from the patient. The risks, benefits and alternatives to therapy were discussed in detail. Specifically, the risks of infection, scarring, bleeding, prolonged wound healing, nerve injury, incomplete removal, allergy to anesthesia and recurrence were addressed. Alternatives to ED&C, such as: surgical removal and XRT were also discussed.  Prior to the procedure, the treatment site was clearly identified and confirmed by the patient. All components of Universal Protocol/PAUSE Rule completed.
Anesthesia Type: 1% lidocaine with epinephrine
Anesthesia Volume In Cc: 1.5
Detail Level: Detailed
Accession # (Optional): [id number]
Size Of Lesion After Curettage: 1.4
What Was Performed First?: Curettage
Additional Information: (Optional): The wound was cleaned, and a pressure dressing was applied.  The patient received detailed post-op instructions.

## 2019-02-06 ENCOUNTER — RX ONLY (OUTPATIENT)
Age: 79
Setting detail: RX ONLY
End: 2019-02-06

## 2019-02-06 RX ORDER — CEPHALEXIN 500 MG/1
ONE CAPSULE ORAL QID
Qty: 40 | Refills: 0 | Status: ERX | COMMUNITY
Start: 2019-02-06

## 2019-02-11 ENCOUNTER — APPOINTMENT (RX ONLY)
Dept: URBAN - METROPOLITAN AREA CLINIC 35 | Facility: CLINIC | Age: 79
Setting detail: DERMATOLOGY
End: 2019-02-11

## 2019-02-11 VITALS — SYSTOLIC BLOOD PRESSURE: 134 MMHG | TEMPERATURE: 97 F | DIASTOLIC BLOOD PRESSURE: 88 MMHG

## 2019-02-11 DIAGNOSIS — Z48.817 ENCOUNTER FOR SURGICAL AFTERCARE FOLLOWING SURGERY ON THE SKIN AND SUBCUTANEOUS TISSUE: ICD-10-CM

## 2019-02-11 PROCEDURE — 99024 POSTOP FOLLOW-UP VISIT: CPT

## 2019-02-11 PROCEDURE — ? POST-OP WOUND CHECK

## 2019-02-11 ASSESSMENT — LOCATION ZONE DERM: LOCATION ZONE: LEG

## 2019-02-11 ASSESSMENT — LOCATION DETAILED DESCRIPTION DERM: LOCATION DETAILED: RIGHT PROXIMAL PRETIBIAL REGION

## 2019-02-11 ASSESSMENT — LOCATION SIMPLE DESCRIPTION DERM: LOCATION SIMPLE: RIGHT PRETIBIAL REGION

## 2019-02-11 NOTE — PROCEDURE: POST-OP WOUND CHECK
Detail Level: Detailed
Add 17690 Cpt? (Important Note: In 2017 The Use Of 49122 Is Being Tracked By Cms To Determine Future Global Period Reimbursement For Global Periods): no
Additional Comments: Denies fever, chills, nausea. On third day of keflex QID.
Wound Evaluated By: Micaela Yarbrough

## 2019-02-12 ENCOUNTER — APPOINTMENT (RX ONLY)
Dept: URBAN - METROPOLITAN AREA CLINIC 35 | Facility: CLINIC | Age: 79
Setting detail: DERMATOLOGY
End: 2019-02-12

## 2019-02-12 DIAGNOSIS — Z48.817 ENCOUNTER FOR SURGICAL AFTERCARE FOLLOWING SURGERY ON THE SKIN AND SUBCUTANEOUS TISSUE: ICD-10-CM

## 2019-02-12 PROCEDURE — 99024 POSTOP FOLLOW-UP VISIT: CPT

## 2019-02-12 PROCEDURE — ? POST-OP WOUND CHECK

## 2019-02-12 ASSESSMENT — LOCATION ZONE DERM: LOCATION ZONE: LEG

## 2019-02-12 ASSESSMENT — LOCATION DETAILED DESCRIPTION DERM: LOCATION DETAILED: RIGHT DISTAL PRETIBIAL REGION

## 2019-02-12 ASSESSMENT — LOCATION SIMPLE DESCRIPTION DERM: LOCATION SIMPLE: RIGHT PRETIBIAL REGION

## 2019-02-12 NOTE — PROCEDURE: POST-OP WOUND CHECK
Detail Level: Detailed
Additional Comments: Pt reports wearing compression socks, sitting at work with leg slightly elevated but reminded pt to elevate above heart and wear compression hose for best results\\[place]bes to ED for worsening pain, swelling, fever, chills or any new or worsening symptoms
Wound Evaluated By: Micaela Yarbrough
Add 23109 Cpt? (Important Note: In 2017 The Use Of 99139 Is Being Tracked By Cms To Determine Future Global Period Reimbursement For Global Periods): no

## 2019-02-12 NOTE — HPI: WOUND CHECK
How Is Your Wound Healing?: healing poorly
Additional History: Pt says wound is about the same “ no worse”

## 2019-02-13 ENCOUNTER — APPOINTMENT (RX ONLY)
Dept: URBAN - METROPOLITAN AREA CLINIC 35 | Facility: CLINIC | Age: 79
Setting detail: DERMATOLOGY
End: 2019-02-13

## 2019-02-13 VITALS — TEMPERATURE: 97.7 F

## 2019-02-13 DIAGNOSIS — L0391 CELLULITIS AND ABSCESS OF UNSPECIFIED SITES: ICD-10-CM | Status: UNCHANGED

## 2019-02-13 DIAGNOSIS — L0390 CELLULITIS AND ABSCESS OF UNSPECIFIED SITES: ICD-10-CM | Status: UNCHANGED

## 2019-02-13 PROBLEM — L03.317 CELLULITIS OF BUTTOCK: Status: ACTIVE | Noted: 2019-02-13

## 2019-02-13 PROBLEM — L03.115 CELLULITIS OF RIGHT LOWER LIMB: Status: ACTIVE | Noted: 2019-02-13

## 2019-02-13 PROCEDURE — ? POST-OP WOUND EVALUATION

## 2019-02-13 PROCEDURE — ? COUNSELING

## 2019-02-13 PROCEDURE — 96372 THER/PROPH/DIAG INJ SC/IM: CPT

## 2019-02-13 PROCEDURE — ? ADDITIONAL NOTES

## 2019-02-13 PROCEDURE — ? INJECTION

## 2019-02-13 ASSESSMENT — LOCATION SIMPLE DESCRIPTION DERM
LOCATION SIMPLE: RIGHT PRETIBIAL REGION
LOCATION SIMPLE: LEFT BUTTOCK

## 2019-02-13 ASSESSMENT — LOCATION DETAILED DESCRIPTION DERM
LOCATION DETAILED: LEFT BUTTOCK
LOCATION DETAILED: RIGHT DISTAL PRETIBIAL REGION

## 2019-02-13 ASSESSMENT — LOCATION ZONE DERM
LOCATION ZONE: TRUNK
LOCATION ZONE: LEG

## 2019-02-13 NOTE — PROCEDURE: INJECTION
Procedure Information: Please note that the numeric value listed in the Medication (1) and associated J-code units and Medication (2) and associated J-code units variables are j-code amounts and do not represent either the concentration or the total amount of the medications injected.  I strongly recommend selecting no to the Render J-code information in note question. This will allow your note to be more clear. If you are billing j-codes with your injection codes you need to document the total amount of the medication injected. This amount should match the j-code units. For example, if you are injecting Triamcinolone 40mg as an intramuscular injection you would select 40 for the dose field and mg for the units. This would allow you to document  with 4 units (40mg = 10mg x 4). The total volume is not used to calculate j-codes only the amount of the medication administered.
Detail Level: Zone
Bill J-Code: yes
Route: IM
Treatment Number: 0
Medication (1) And Associated J-Code Units: Ceftriaxone, 250mg
Total Volume Injected In Cc (Will Not Affected Billing): 1cc
Dose Administered (Numbers Only - Mg, G, Mcg, Units, Cc): 500
Units: mg
Expiration Date (Optional): 01/2020
Administered By (Optional): Micaela IGLESIAS
Consent: The risks of the medication were reviewed with the patient.
Additional Comments: 500mg of Ceftriaxone was given IM to left glute
Post-Care Instructions: I reviewed with the patient in detail post-care instructions. Patient understands to keep the injection sites clean and call the clinic if there is any redness, swelling or pain.
Dose Administered (Numbers Only - Mg, G, Mcg, Units, Cc): 500mg
Detail Level: None

## 2019-02-13 NOTE — PROCEDURE: POST-OP WOUND EVALUATION
Wound Color?: red
Wound Crusting?: crusted
Wound Dehiscence?: dehiscence
Wound Discharge?: minimal discharge
Add 21327 Cpt? (Important Note: In 2017 The Use Of 23731 Is Being Tracked By Cms To Determine Future Global Period Reimbursement For Global Periods): no
Wound Evaluated By (Optional): Micaela Yarbrough NP
Wound Bruising?: mild
Wound Diameter In Cm(Optional): 0
Wound Edema?: moderate
Detail Level: Detailed
Wound Drainage?: purulent drainage

## 2019-02-14 ENCOUNTER — APPOINTMENT (RX ONLY)
Dept: URBAN - METROPOLITAN AREA CLINIC 35 | Facility: CLINIC | Age: 79
Setting detail: DERMATOLOGY
End: 2019-02-14

## 2019-02-14 VITALS — TEMPERATURE: 97.6 F

## 2019-02-14 DIAGNOSIS — L0390 CELLULITIS AND ABSCESS OF UNSPECIFIED SITES: ICD-10-CM

## 2019-02-14 DIAGNOSIS — L0391 CELLULITIS AND ABSCESS OF UNSPECIFIED SITES: ICD-10-CM

## 2019-02-14 PROBLEM — L03.115 CELLULITIS OF RIGHT LOWER LIMB: Status: ACTIVE | Noted: 2019-02-14

## 2019-02-14 PROCEDURE — ? ADDITIONAL NOTES

## 2019-02-14 ASSESSMENT — LOCATION ZONE DERM: LOCATION ZONE: LEG

## 2019-02-14 ASSESSMENT — LOCATION DETAILED DESCRIPTION DERM: LOCATION DETAILED: RIGHT DISTAL PRETIBIAL REGION

## 2019-02-14 ASSESSMENT — LOCATION SIMPLE DESCRIPTION DERM: LOCATION SIMPLE: RIGHT PRETIBIAL REGION

## 2019-02-14 NOTE — PROCEDURE: ADDITIONAL NOTES
Additional Notes: Wound looks significantly improved from photograph.  Much less erythema than previous exam.\\nPatient to continue taking Keflex 500 mg QID, continue apply levicyn twice daily and keep wound covered and elevated. Wound was cleaned, levicyn placed on the wound and then wrapped with gauze and a bandage. Patient will walk in for another wound check Tuesday February 19th.  She will call over the weekend if worsening.
Detail Level: Detailed

## 2019-02-20 ENCOUNTER — APPOINTMENT (RX ONLY)
Dept: URBAN - METROPOLITAN AREA CLINIC 35 | Facility: CLINIC | Age: 79
Setting detail: DERMATOLOGY
End: 2019-02-20

## 2019-02-20 VITALS — TEMPERATURE: 97.5 F

## 2019-02-20 VITALS — TEMPERATURE: 97.4 F

## 2019-02-20 DIAGNOSIS — L0391 CELLULITIS AND ABSCESS OF UNSPECIFIED SITES: ICD-10-CM

## 2019-02-20 DIAGNOSIS — L0390 CELLULITIS AND ABSCESS OF UNSPECIFIED SITES: ICD-10-CM

## 2019-02-20 PROBLEM — L03.115 CELLULITIS OF RIGHT LOWER LIMB: Status: ACTIVE | Noted: 2019-02-20

## 2019-02-20 PROCEDURE — 99024 POSTOP FOLLOW-UP VISIT: CPT

## 2019-02-20 PROCEDURE — 96372 THER/PROPH/DIAG INJ SC/IM: CPT

## 2019-02-20 PROCEDURE — ? ADDITIONAL NOTES

## 2019-02-20 PROCEDURE — ? COUNSELING

## 2019-02-20 PROCEDURE — ? PRESCRIPTION

## 2019-02-20 PROCEDURE — ? INJECTION

## 2019-02-20 RX ORDER — FLUCONAZOLE 200 MG/1
TABLET ORAL
Qty: 3 | Refills: 3 | COMMUNITY
Start: 2019-02-20

## 2019-02-20 RX ADMIN — FLUCONAZOLE 1: 200 TABLET ORAL at 00:00

## 2019-02-20 ASSESSMENT — LOCATION DETAILED DESCRIPTION DERM
LOCATION DETAILED: RIGHT DISTAL PRETIBIAL REGION
LOCATION DETAILED: RIGHT DISTAL PRETIBIAL REGION
LOCATION DETAILED: RIGHT INFERIOR LATERAL LOWER BACK

## 2019-02-20 ASSESSMENT — LOCATION SIMPLE DESCRIPTION DERM
LOCATION SIMPLE: RIGHT LOWER BACK
LOCATION SIMPLE: RIGHT PRETIBIAL REGION
LOCATION SIMPLE: RIGHT PRETIBIAL REGION

## 2019-02-20 ASSESSMENT — LOCATION ZONE DERM
LOCATION ZONE: LEG
LOCATION ZONE: LEG
LOCATION ZONE: TRUNK

## 2019-02-20 NOTE — PROCEDURE: ADDITIONAL NOTES
Detail Level: Zone
Additional Notes: Swabbed wound on the right anterior lower leg for bacterial culture.\\nDr Huey examined pt as well.

## 2019-02-20 NOTE — PROCEDURE: INJECTION
Treatment Number: 2
Units: mg
Procedure Information: Please note that the numeric value listed in the Medication (1) and associated J-code units and Medication (2) and associated J-code units variables are j-code amounts and do not represent either the concentration or the total amount of the medications injected.  I strongly recommend selecting no to the Render J-code information in note question. This will allow your note to be more clear. If you are billing j-codes with your injection codes you need to document the total amount of the medication injected. This amount should match the j-code units. For example, if you are injecting Triamcinolone 40mg as an intramuscular injection you would select 40 for the dose field and mg for the units. This would allow you to document  with 4 units (40mg = 10mg x 4). The total volume is not used to calculate j-codes only the amount of the medication administered.
Detail Level: None
Medication (1) And Associated J-Code Units: Ceftriaxone, 250mg
Consent: The risks of the medication were reviewed with the patient.
Post-Care Instructions: I reviewed with the patient in detail post-care instructions. Patient understands to keep the injection sites clean and call the clinic if there is any redness, swelling or pain.
Lot # (Optional): [id number]
Expiration Date (Optional): FEB 2019
Bill J-Code: yes
Administered By (Optional): Micaela IGLESIAS
Route: IM
Dose Administered (Numbers Only - Mg, G, Mcg, Units, Cc): 500
Dose Administered (Numbers Only - Mg, G, Mcg, Units, Cc): 0

## 2019-02-20 NOTE — PROCEDURE: ADDITIONAL NOTES
Additional Notes: Wound cleansed with Levicyn gel, white petrolatum applied with a band aid.
Detail Level: Zone

## 2019-02-21 ENCOUNTER — APPOINTMENT (RX ONLY)
Dept: URBAN - METROPOLITAN AREA CLINIC 35 | Facility: CLINIC | Age: 79
Setting detail: DERMATOLOGY
End: 2019-02-21

## 2019-02-21 VITALS — TEMPERATURE: 97.4 F

## 2019-02-21 DIAGNOSIS — L0390 CELLULITIS AND ABSCESS OF UNSPECIFIED SITES: ICD-10-CM

## 2019-02-21 DIAGNOSIS — L0391 CELLULITIS AND ABSCESS OF UNSPECIFIED SITES: ICD-10-CM

## 2019-02-21 PROBLEM — L03.115 CELLULITIS OF RIGHT LOWER LIMB: Status: ACTIVE | Noted: 2019-02-21

## 2019-02-21 PROCEDURE — ? COUNSELING

## 2019-02-21 PROCEDURE — 99024 POSTOP FOLLOW-UP VISIT: CPT

## 2019-02-21 PROCEDURE — ? ADDITIONAL NOTES

## 2019-02-21 RX ORDER — SULFAMETHOXAZOLE AND TRIMETHOPRIM 800; 160 MG/1; MG/1
1 TABLET ORAL BID
Qty: 20 | Refills: 0 | COMMUNITY
Start: 2019-02-21

## 2019-02-21 RX ADMIN — SULFAMETHOXAZOLE AND TRIMETHOPRIM 1: 800; 160 TABLET ORAL at 00:00

## 2019-02-21 ASSESSMENT — LOCATION DETAILED DESCRIPTION DERM: LOCATION DETAILED: RIGHT DISTAL PRETIBIAL REGION

## 2019-02-21 ASSESSMENT — LOCATION SIMPLE DESCRIPTION DERM: LOCATION SIMPLE: RIGHT PRETIBIAL REGION

## 2019-02-21 ASSESSMENT — LOCATION ZONE DERM: LOCATION ZONE: LEG

## 2019-02-21 NOTE — PROCEDURE: ADDITIONAL NOTES
Detail Level: Zone
Additional Notes: Wound cleansed with Levicyn Spray then occluded with thick DuoDerm dressing. Patient advised to leave in place until she returns for wound check tomorrow.

## 2019-02-22 ENCOUNTER — APPOINTMENT (RX ONLY)
Dept: URBAN - METROPOLITAN AREA CLINIC 35 | Facility: CLINIC | Age: 79
Setting detail: DERMATOLOGY
End: 2019-02-22

## 2019-02-22 VITALS — TEMPERATURE: 97.4 F | SYSTOLIC BLOOD PRESSURE: 122 MMHG | DIASTOLIC BLOOD PRESSURE: 82 MMHG

## 2019-02-22 DIAGNOSIS — L0390 CELLULITIS AND ABSCESS OF UNSPECIFIED SITES: ICD-10-CM

## 2019-02-22 DIAGNOSIS — L0391 CELLULITIS AND ABSCESS OF UNSPECIFIED SITES: ICD-10-CM

## 2019-02-22 PROBLEM — L03.115 CELLULITIS OF RIGHT LOWER LIMB: Status: ACTIVE | Noted: 2019-02-22

## 2019-02-22 PROCEDURE — ? COUNSELING

## 2019-02-22 PROCEDURE — ? ADDITIONAL NOTES

## 2019-02-22 ASSESSMENT — LOCATION ZONE DERM: LOCATION ZONE: LEG

## 2019-02-22 ASSESSMENT — LOCATION DETAILED DESCRIPTION DERM: LOCATION DETAILED: RIGHT DISTAL PRETIBIAL REGION

## 2019-02-22 ASSESSMENT — LOCATION SIMPLE DESCRIPTION DERM: LOCATION SIMPLE: RIGHT PRETIBIAL REGION

## 2019-02-22 NOTE — PROCEDURE: ADDITIONAL NOTES
Additional Notes: - Cleansed wound today with Levicyn spray and occluded with DuoDerm.\\n- Complete 10 days course of Bactrim  mg twice a day. \\n- Cleanse wound once daily with Epicyn Antimicrobial Cleanser and occlude with DuoDerm provided. \\n- Recommend wearing compression hose and keep leg elevated when possible.\\n\\n* 2/20/19 Bacterial Results Pending
Detail Level: Detailed

## 2019-02-25 ENCOUNTER — APPOINTMENT (RX ONLY)
Dept: URBAN - METROPOLITAN AREA CLINIC 35 | Facility: CLINIC | Age: 79
Setting detail: DERMATOLOGY
End: 2019-02-25

## 2019-02-25 VITALS — TEMPERATURE: 97.3 F | DIASTOLIC BLOOD PRESSURE: 70 MMHG | SYSTOLIC BLOOD PRESSURE: 115 MMHG

## 2019-02-25 DIAGNOSIS — L0391 CELLULITIS AND ABSCESS OF UNSPECIFIED SITES: ICD-10-CM

## 2019-02-25 DIAGNOSIS — L0390 CELLULITIS AND ABSCESS OF UNSPECIFIED SITES: ICD-10-CM

## 2019-02-25 PROBLEM — L03.115 CELLULITIS OF RIGHT LOWER LIMB: Status: ACTIVE | Noted: 2019-02-25

## 2019-02-25 PROCEDURE — ? COUNSELING

## 2019-02-25 PROCEDURE — ? ADDITIONAL NOTES

## 2019-02-25 PROCEDURE — 99024 POSTOP FOLLOW-UP VISIT: CPT

## 2019-02-25 ASSESSMENT — LOCATION ZONE DERM: LOCATION ZONE: LEG

## 2019-02-25 ASSESSMENT — LOCATION SIMPLE DESCRIPTION DERM: LOCATION SIMPLE: RIGHT PRETIBIAL REGION

## 2019-02-25 ASSESSMENT — LOCATION DETAILED DESCRIPTION DERM: LOCATION DETAILED: RIGHT DISTAL PRETIBIAL REGION

## 2019-02-25 NOTE — PROCEDURE: ADDITIONAL NOTES
Additional Notes: - Cleansed wound today with Levicyn spray and occluded with DuoDerm.\\n- Complete 10 days course of Bactrim  mg twice a day. \\n- Cleanse wound once daily with Epicyn Antimicrobial Cleanser and occlude with DuoDerm provided. \\n- Recommend wearing compression hose and keep leg elevated when possible.\\n\\n* 2/20/19 Bacterial Results Yeast
Detail Level: Detailed

## 2019-03-04 ENCOUNTER — APPOINTMENT (RX ONLY)
Dept: URBAN - METROPOLITAN AREA CLINIC 35 | Facility: CLINIC | Age: 79
Setting detail: DERMATOLOGY
End: 2019-03-04

## 2019-03-04 DIAGNOSIS — L0391 CELLULITIS AND ABSCESS OF UNSPECIFIED SITES: ICD-10-CM

## 2019-03-04 DIAGNOSIS — L81.4 OTHER MELANIN HYPERPIGMENTATION: ICD-10-CM

## 2019-03-04 DIAGNOSIS — L81.8 OTHER SPECIFIED DISORDERS OF PIGMENTATION: ICD-10-CM

## 2019-03-04 DIAGNOSIS — Z85.828 PERSONAL HISTORY OF OTHER MALIGNANT NEOPLASM OF SKIN: ICD-10-CM

## 2019-03-04 DIAGNOSIS — L57.0 ACTINIC KERATOSIS: ICD-10-CM

## 2019-03-04 DIAGNOSIS — L0390 CELLULITIS AND ABSCESS OF UNSPECIFIED SITES: ICD-10-CM

## 2019-03-04 DIAGNOSIS — Z71.89 OTHER SPECIFIED COUNSELING: ICD-10-CM

## 2019-03-04 PROBLEM — L03.115 CELLULITIS OF RIGHT LOWER LIMB: Status: ACTIVE | Noted: 2019-03-04

## 2019-03-04 PROCEDURE — ? LIQUID NITROGEN

## 2019-03-04 PROCEDURE — ? ADDITIONAL NOTES

## 2019-03-04 PROCEDURE — 99214 OFFICE O/P EST MOD 30 MIN: CPT | Mod: 25

## 2019-03-04 PROCEDURE — 17000 DESTRUCT PREMALG LESION: CPT

## 2019-03-04 PROCEDURE — ? PRESCRIPTION

## 2019-03-04 PROCEDURE — ? COUNSELING

## 2019-03-04 PROCEDURE — 17003 DESTRUCT PREMALG LES 2-14: CPT

## 2019-03-04 PROCEDURE — ? BENIGN DESTRUCTION COSMETIC

## 2019-03-04 RX ORDER — NYSTATIN 100000 [USP'U]/G
1 CREAM TOPICAL BID
Qty: 1 | Refills: 2 | COMMUNITY
Start: 2019-03-04

## 2019-03-04 RX ADMIN — NYSTATIN 1: 100000 CREAM TOPICAL at 00:00

## 2019-03-04 ASSESSMENT — LOCATION DETAILED DESCRIPTION DERM
LOCATION DETAILED: RIGHT DISTAL PRETIBIAL REGION
LOCATION DETAILED: LEFT ULNAR DORSAL HAND
LOCATION DETAILED: LEFT MEDIAL SUPERIOR CHEST
LOCATION DETAILED: RIGHT CENTRAL EYEBROW
LOCATION DETAILED: NASAL DORSUM
LOCATION DETAILED: RIGHT RADIAL DORSAL HAND
LOCATION DETAILED: EPIGASTRIC SKIN
LOCATION DETAILED: LEFT PROXIMAL POSTERIOR UPPER ARM
LOCATION DETAILED: NASAL TIP
LOCATION DETAILED: RIGHT LATERAL SUPERIOR CHEST
LOCATION DETAILED: RIGHT MEDIAL DISTAL PRETIBIAL REGION
LOCATION DETAILED: LEFT MEDIAL DISTAL PRETIBIAL REGION
LOCATION DETAILED: LEFT RADIAL DORSAL HAND
LOCATION DETAILED: RIGHT PROXIMAL LATERAL POSTERIOR UPPER ARM
LOCATION DETAILED: RIGHT INFERIOR MEDIAL FOREHEAD
LOCATION DETAILED: RIGHT MEDIAL MALAR CHEEK
LOCATION DETAILED: RIGHT PROXIMAL DORSAL FOREARM
LOCATION DETAILED: RIGHT PROXIMAL PRETIBIAL REGION
LOCATION DETAILED: RIGHT CENTRAL MALAR CHEEK
LOCATION DETAILED: RIGHT DORSAL INDEX METACARPOPHALANGEAL JOINT
LOCATION DETAILED: LEFT SUPERIOR UPPER BACK
LOCATION DETAILED: LEFT PROXIMAL DORSAL FOREARM
LOCATION DETAILED: LEFT MEDIAL MALAR CHEEK
LOCATION DETAILED: LEFT POSTERIOR SHOULDER

## 2019-03-04 ASSESSMENT — LOCATION ZONE DERM
LOCATION ZONE: TRUNK
LOCATION ZONE: LEG
LOCATION ZONE: HAND
LOCATION ZONE: NOSE
LOCATION ZONE: FACE
LOCATION ZONE: ARM

## 2019-03-04 ASSESSMENT — LOCATION SIMPLE DESCRIPTION DERM
LOCATION SIMPLE: RIGHT CHEEK
LOCATION SIMPLE: RIGHT HAND
LOCATION SIMPLE: NOSE
LOCATION SIMPLE: LEFT HAND
LOCATION SIMPLE: RIGHT PRETIBIAL REGION
LOCATION SIMPLE: LEFT CHEEK
LOCATION SIMPLE: RIGHT POSTERIOR UPPER ARM
LOCATION SIMPLE: ABDOMEN
LOCATION SIMPLE: RIGHT FOREHEAD
LOCATION SIMPLE: LEFT POSTERIOR UPPER ARM
LOCATION SIMPLE: RIGHT EYEBROW
LOCATION SIMPLE: LEFT PRETIBIAL REGION
LOCATION SIMPLE: CHEST
LOCATION SIMPLE: LEFT UPPER BACK
LOCATION SIMPLE: RIGHT FOREARM
LOCATION SIMPLE: LEFT FOREARM
LOCATION SIMPLE: LEFT SHOULDER

## 2019-03-04 NOTE — PROCEDURE: ADDITIONAL NOTES
Additional Notes: - Cleansed wound today with Levicyn spray and occluded with gauze and Hypafix \\n- Cleanse wound once daily with Epicyn Antimicrobial Cleanser and occlude with A band aid \\n- Recommend wearing compression hose and keep leg elevated when possible \\n\\nCancelled appointment with Dr. Miranda today for possible Puraply application. Will defer until pt has treated with nystatin x 1-2 weeks. \\n\\n* 2/20/19 Bacterial Results Yeast
Detail Level: Detailed

## 2019-03-04 NOTE — PROCEDURE: LIQUID NITROGEN
Render Post-Care Instructions In Note?: no
Duration Of Freeze Thaw-Cycle (Seconds): 2
Number Of Freeze-Thaw Cycles: 2 freeze-thaw cycles
Post-Care Instructions: I reviewed with the patient in detail post-care instructions. Patient is to wear sunprotection, and avoid picking at any of the treated lesions. Pt may apply Vaseline to crusted or scabbing areas.
Consent: The patient's consent was obtained including but not limited to risks of crusting, scabbing, blistering, scarring, darker or lighter pigmentary change, recurrence, incomplete removal and infection.
Detail Level: Detailed

## 2019-03-05 ENCOUNTER — RX ONLY (OUTPATIENT)
Age: 79
Setting detail: RX ONLY
End: 2019-03-05

## 2019-03-05 RX ORDER — NYSTATIN 100000 1/G
POWDER TOPICAL
Qty: 1 | Refills: 2 | Status: ERX

## 2019-03-07 ENCOUNTER — APPOINTMENT (RX ONLY)
Dept: URBAN - METROPOLITAN AREA CLINIC 35 | Facility: CLINIC | Age: 79
Setting detail: DERMATOLOGY
End: 2019-03-07

## 2019-03-07 VITALS — SYSTOLIC BLOOD PRESSURE: 132 MMHG | DIASTOLIC BLOOD PRESSURE: 78 MMHG | TEMPERATURE: 98.2 F

## 2019-03-07 DIAGNOSIS — Z48.817 ENCOUNTER FOR SURGICAL AFTERCARE FOLLOWING SURGERY ON THE SKIN AND SUBCUTANEOUS TISSUE: ICD-10-CM

## 2019-03-07 DIAGNOSIS — L0391 CELLULITIS AND ABSCESS OF UNSPECIFIED SITES: ICD-10-CM | Status: IMPROVED

## 2019-03-07 DIAGNOSIS — Z41.9 ENCOUNTER FOR PROCEDURE FOR PURPOSES OTHER THAN REMEDYING HEALTH STATE, UNSPECIFIED: ICD-10-CM

## 2019-03-07 DIAGNOSIS — L0390 CELLULITIS AND ABSCESS OF UNSPECIFIED SITES: ICD-10-CM | Status: IMPROVED

## 2019-03-07 PROBLEM — L03.115 CELLULITIS OF RIGHT LOWER LIMB: Status: ACTIVE | Noted: 2019-03-07

## 2019-03-07 PROCEDURE — ? FILLERS

## 2019-03-07 PROCEDURE — ? ADDITIONAL NOTES

## 2019-03-07 PROCEDURE — ? COUNSELING

## 2019-03-07 PROCEDURE — ? BOTOX

## 2019-03-07 ASSESSMENT — LOCATION SIMPLE DESCRIPTION DERM: LOCATION SIMPLE: RIGHT PRETIBIAL REGION

## 2019-03-07 ASSESSMENT — LOCATION DETAILED DESCRIPTION DERM: LOCATION DETAILED: RIGHT DISTAL PRETIBIAL REGION

## 2019-03-07 ASSESSMENT — LOCATION ZONE DERM: LOCATION ZONE: LEG

## 2019-03-07 NOTE — PROCEDURE: BOTOX
Additional Area 4 Units: 0
Additional Area 3 Location: [place]
Post-Care Instructions: Patient instructed to not lie down for 4 hours after injections and limit physical activity for 24 hours.
Dilution (U/0.1 Cc): 5
Additional Area 1 Units: 16
Expiration Date (Month Year): 01/2021
Price (Use Numbers Only, No Special Characters Or $): 055
Additional Area 2 Location: [place]
Additional Area 6 Location: platysma
Lot #: [id number]
Additional Area 2 Units: 34
Consent: Verbal and written informed consent were obtained to include the following risks: pain, swelling, bruising, eyelid or eyebrow droop, and lack of visible improvement of wrinkles in the areas treated.  The skin was cleansed with alcohol. Injections were administered with a 32g needle into the following areas:
Additional Area 1 Location: [place]
Additional Area 4 Location: Bunny lines
Reconstitution Date (Optional): 3/7/2019
Detail Level: Detailed
Additional Area 5 Location: perioral

## 2019-03-07 NOTE — PROCEDURE: ADDITIONAL NOTES
Detail Level: Detailed
Additional Notes: - Cleanse wound once daily with Epicyn Antimicrobial Cleanser \\n- Continue applying Nystatin powder twice a day as the pain and redness has decreased since treating with nystatin powder.  Nystatic cream was very irritating.  Will recheck on Monday. 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

## 2019-03-07 NOTE — PROCEDURE: FILLERS
Decollete Filler Volume In Cc: 0
Additional Area 3 Location: Fine lines around mouth
Expiration Date (Month Year): 2019.11.26
Additional Area 2 Location: Border of lips
Include Cannula Information In Note?: No
Additional Area 4 Location: Scars
Filler: Juvederm Ultra XC
Additional Area 1 Location: Oral Commisures
Additional Area 5 Location: Earlobes
Detail Level: Detailed
Map Statment: See Attach Map for Complete Details
Additional Area 1 Volume In Cc: 0.5
Filler Comments: 0.4cc lower lip, 0.1cc upper lip
Post-Care Instructions: Patient instructed to apply ice to reduce swelling.
Lot #: [id number]
Price (Use Numbers Only, No Special Characters Or $): 668
Consent: Written consent obtained. Risks include but not limited to bruising, beading, irregular texture, ulceration, infection, allergic reaction, scar formation, incomplete augmentation, temporary nature, procedural pain.
Use Map Statement For Sites (Optional): Yes

## 2019-03-14 ENCOUNTER — APPOINTMENT (RX ONLY)
Dept: URBAN - METROPOLITAN AREA CLINIC 35 | Facility: CLINIC | Age: 79
Setting detail: DERMATOLOGY
End: 2019-03-14

## 2019-03-14 VITALS — DIASTOLIC BLOOD PRESSURE: 80 MMHG | TEMPERATURE: 98 F | SYSTOLIC BLOOD PRESSURE: 118 MMHG

## 2019-03-14 DIAGNOSIS — Z48.817 ENCOUNTER FOR SURGICAL AFTERCARE FOLLOWING SURGERY ON THE SKIN AND SUBCUTANEOUS TISSUE: ICD-10-CM

## 2019-03-14 DIAGNOSIS — L85.3 XEROSIS CUTIS: ICD-10-CM

## 2019-03-14 DIAGNOSIS — L0390 CELLULITIS AND ABSCESS OF UNSPECIFIED SITES: ICD-10-CM

## 2019-03-14 DIAGNOSIS — L0391 CELLULITIS AND ABSCESS OF UNSPECIFIED SITES: ICD-10-CM

## 2019-03-14 PROBLEM — L03.115 CELLULITIS OF RIGHT LOWER LIMB: Status: ACTIVE | Noted: 2019-03-14

## 2019-03-14 PROCEDURE — ? ADDITIONAL NOTES

## 2019-03-14 PROCEDURE — ? COUNSELING

## 2019-03-14 ASSESSMENT — SEVERITY ASSESSMENT: SEVERITY: ALMOST CLEAR

## 2019-03-14 ASSESSMENT — LOCATION ZONE DERM: LOCATION ZONE: LEG

## 2019-03-14 ASSESSMENT — LOCATION SIMPLE DESCRIPTION DERM: LOCATION SIMPLE: RIGHT PRETIBIAL REGION

## 2019-03-14 ASSESSMENT — LOCATION DETAILED DESCRIPTION DERM: LOCATION DETAILED: RIGHT DISTAL PRETIBIAL REGION

## 2019-03-14 NOTE — PROCEDURE: COUNSELING
Detail Level: Detailed
Detail Level: Simple
Patient Specific Counseling (Will Not Stick From Patient To Patient): Dexeryl cream was provided to patient. Advised her to apply BID-TID to affected areas on the lower right leg.

## 2019-03-14 NOTE — PROCEDURE: ADDITIONAL NOTES
Detail Level: Detailed
Additional Notes: - Cleanse wound once daily with Epicyn Antimicrobial Cleanser \\n- Continue applying Nystatin powder twice a day as the pain and redness has decreased since treating with nystatin powder, then apply a thin layer of Vaseline on top and apply a bandage on top.  \\n- Recommend wearing compression hose and keep leg elevated when possible. \\n- At this point it looks like the candida was pathogenic and has significantly resolved with topical treatment.  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  She is now only applying the powder to the wound base.\\n\\n* 2/20/19 Culture Results: Candida parapsiliosis

## 2019-03-17 ENCOUNTER — HOSPITAL ENCOUNTER (OUTPATIENT)
Facility: MEDICAL CENTER | Age: 79
End: 2019-03-17
Attending: NURSE PRACTITIONER
Payer: MEDICARE

## 2019-03-17 ENCOUNTER — OFFICE VISIT (OUTPATIENT)
Dept: URGENT CARE | Facility: CLINIC | Age: 79
End: 2019-03-17
Payer: MEDICARE

## 2019-03-17 VITALS
HEIGHT: 67 IN | TEMPERATURE: 98.5 F | WEIGHT: 118 LBS | HEART RATE: 88 BPM | SYSTOLIC BLOOD PRESSURE: 132 MMHG | RESPIRATION RATE: 16 BRPM | OXYGEN SATURATION: 99 % | DIASTOLIC BLOOD PRESSURE: 90 MMHG | BODY MASS INDEX: 18.52 KG/M2

## 2019-03-17 DIAGNOSIS — M79.671 PAIN OF RIGHT FOOT: ICD-10-CM

## 2019-03-17 DIAGNOSIS — N30.01 ACUTE CYSTITIS WITH HEMATURIA: ICD-10-CM

## 2019-03-17 DIAGNOSIS — F33.41 RECURRENT MAJOR DEPRESSIVE DISORDER, IN PARTIAL REMISSION (HCC): ICD-10-CM

## 2019-03-17 LAB
APPEARANCE UR: NORMAL
BILIRUB UR STRIP-MCNC: NEGATIVE MG/DL
COLOR UR AUTO: YELLOW
GLUCOSE UR STRIP.AUTO-MCNC: NEGATIVE MG/DL
KETONES UR STRIP.AUTO-MCNC: NEGATIVE MG/DL
LEUKOCYTE ESTERASE UR QL STRIP.AUTO: NORMAL
NITRITE UR QL STRIP.AUTO: POSITIVE
PH UR STRIP.AUTO: 7 [PH] (ref 5–8)
PROT UR QL STRIP: NORMAL MG/DL
RBC UR QL AUTO: NORMAL
SP GR UR STRIP.AUTO: 1.01
UROBILINOGEN UR STRIP-MCNC: 0.2 MG/DL

## 2019-03-17 PROCEDURE — 99214 OFFICE O/P EST MOD 30 MIN: CPT | Performed by: NURSE PRACTITIONER

## 2019-03-17 PROCEDURE — 99000 SPECIMEN HANDLING OFFICE-LAB: CPT | Performed by: NURSE PRACTITIONER

## 2019-03-17 PROCEDURE — 87086 URINE CULTURE/COLONY COUNT: CPT

## 2019-03-17 PROCEDURE — 81002 URINALYSIS NONAUTO W/O SCOPE: CPT | Performed by: NURSE PRACTITIONER

## 2019-03-17 RX ORDER — CEFDINIR 300 MG/1
300 CAPSULE ORAL EVERY 12 HOURS
Qty: 10 CAP | Refills: 0 | Status: SHIPPED | OUTPATIENT
Start: 2019-03-17 | End: 2019-03-22

## 2019-03-17 ASSESSMENT — ENCOUNTER SYMPTOMS
DIZZINESS: 0
COUGH: 0
SHORTNESS OF BREATH: 0
FATIGUE: 0
NAUSEA: 0
FLANK PAIN: 0
EYE PAIN: 0
MYALGIAS: 0
VOMITING: 0
FEVER: 0
SORE THROAT: 0
CHILLS: 0

## 2019-03-17 NOTE — PROGRESS NOTES
"Subjective:   Afsaneh Lyn is a 78 y.o. female who presents for UTI (x3 days, frequency of urination, pain during urination) and Foot Problem (x1 week ago, cut the bottom of her foot, painful)        UTI   This is a new problem. Episode onset: 3 days. The problem occurs constantly. The problem has been unchanged. Associated symptoms include urinary symptoms. Pertinent negatives include no chest pain, chills, congestion, coughing, fatigue, fever, myalgias, nausea, rash, sore throat or vomiting. Nothing aggravates the symptoms. She has tried nothing for the symptoms. The treatment provided no relief.   Foot Problem   This is a new problem. The current episode started 1 to 4 weeks ago. The problem occurs constantly. The problem has been unchanged. Associated symptoms include urinary symptoms. Pertinent negatives include no chest pain, chills, congestion, coughing, fatigue, fever, myalgias, nausea, rash, sore throat or vomiting. Associated symptoms comments: Pain of sole of right foot possible a splinter     . The symptoms are aggravated by walking. She has tried nothing for the symptoms. The treatment provided no relief.     Review of Systems   Constitutional: Negative for chills, fatigue and fever.   HENT: Negative for congestion and sore throat.    Eyes: Negative for pain.   Respiratory: Negative for cough and shortness of breath.    Cardiovascular: Negative for chest pain.   Gastrointestinal: Negative for nausea and vomiting.   Genitourinary: Positive for dysuria and urgency. Negative for flank pain, frequency and hematuria.   Musculoskeletal: Negative for myalgias.        Sole of right foot painful     Skin: Negative for rash.   Neurological: Negative for dizziness.     Allergies   Allergen Reactions   • Macrobid [Nitrofurantoin Monohydrate Macrocrystals] Nausea     Cramp in legs      Objective:   /90   Pulse 88   Temp 36.9 °C (98.5 °F)   Resp 16   Ht 1.702 m (5' 7\")   Wt 53.5 kg (118 lb)   " SpO2 99%   BMI 18.48 kg/m²   Physical Exam   Constitutional: She is oriented to person, place, and time. She appears well-developed and well-nourished. No distress.   HENT:   Head: Normocephalic and atraumatic.   Eyes: Pupils are equal, round, and reactive to light. Conjunctivae and EOM are normal.   Cardiovascular: Normal rate and regular rhythm.    No murmur heard.  Pulmonary/Chest: Effort normal and breath sounds normal. No respiratory distress.   Abdominal: Soft. She exhibits no distension. There is no tenderness. There is no CVA tenderness.   Musculoskeletal:        Feet:    Neurological: She is alert and oriented to person, place, and time. She has normal reflexes. No sensory deficit.   Skin: Skin is warm and dry.   Psychiatric: She has a normal mood and affect.         Assessment/Plan:     1. Acute cystitis with hematuria  Urine Culture    POCT Urinalysis    cefdinir (OMNICEF) 300 MG Cap   2. Pain of right foot       Lab Results   Component Value Date/Time    POCCOLOR yellow 03/17/2019 10:17 AM    POCAPPEAR slightly cloudy 03/17/2019 10:17 AM    POCLEUKEST small 03/17/2019 10:17 AM    POCNITRITE positive 03/17/2019 10:17 AM    POCUROBILIGE 0.2 03/17/2019 10:17 AM    POCPROTEIN trace 03/17/2019 10:17 AM    POCURPH 7.0 03/17/2019 10:17 AM    POCBLOOD trace-intact 03/17/2019 10:17 AM    POCSPGRV 1.015 03/17/2019 10:17 AM    POCKETONES negative 03/17/2019 10:17 AM    POCBILIRUBIN negative 03/17/2019 10:17 AM    POCGLUCUA negative 03/17/2019 10:17 AM      Pt. Was given ABX therapy today and will change therapy if culture indicates this is necessary. ER precautions given- worsening symptoms, back pain, abd. Pain, or fevers.   Pt. Is to increase fluids, and take the complete duration of the therapy.    No foreign body visualized of the right foot, no signs of infection.  Patient is scheduled to follow-up with podiatrist in 3 weeks.  Had discussed exploration and attempting to remove foreign body however patient  declining at this time.  Will follow-up as scheduled with podiatry.  Differential diagnosis, natural history, supportive care, and indications for immediate follow-up discussed.

## 2019-03-18 RX ORDER — FLUOXETINE HYDROCHLORIDE 20 MG/1
CAPSULE ORAL
Qty: 90 CAP | Refills: 0 | Status: SHIPPED | OUTPATIENT
Start: 2019-03-18 | End: 2019-05-23 | Stop reason: SDUPTHER

## 2019-03-19 ENCOUNTER — APPOINTMENT (RX ONLY)
Dept: URBAN - METROPOLITAN AREA CLINIC 35 | Facility: CLINIC | Age: 79
Setting detail: DERMATOLOGY
End: 2019-03-19

## 2019-03-19 ENCOUNTER — TELEPHONE (OUTPATIENT)
Dept: URGENT CARE | Facility: CLINIC | Age: 79
End: 2019-03-19

## 2019-03-19 DIAGNOSIS — L85.3 XEROSIS CUTIS: ICD-10-CM

## 2019-03-19 DIAGNOSIS — L0391 CELLULITIS AND ABSCESS OF UNSPECIFIED SITES: ICD-10-CM

## 2019-03-19 DIAGNOSIS — L0390 CELLULITIS AND ABSCESS OF UNSPECIFIED SITES: ICD-10-CM

## 2019-03-19 PROBLEM — L03.115 CELLULITIS OF RIGHT LOWER LIMB: Status: ACTIVE | Noted: 2019-03-19

## 2019-03-19 LAB
BACTERIA UR CULT: NORMAL
SIGNIFICANT IND 70042: NORMAL
SITE SITE: NORMAL
SOURCE SOURCE: NORMAL

## 2019-03-19 PROCEDURE — ? ADDITIONAL NOTES

## 2019-03-19 PROCEDURE — ? COUNSELING

## 2019-03-19 ASSESSMENT — LOCATION DETAILED DESCRIPTION DERM: LOCATION DETAILED: RIGHT DISTAL PRETIBIAL REGION

## 2019-03-19 ASSESSMENT — LOCATION SIMPLE DESCRIPTION DERM: LOCATION SIMPLE: RIGHT PRETIBIAL REGION

## 2019-03-19 ASSESSMENT — LOCATION ZONE DERM: LOCATION ZONE: LEG

## 2019-03-19 NOTE — TELEPHONE ENCOUNTER
Called and advised patient of negative urine culture.  Patient did verbalize she is having improvement of symptoms despite negative urine.  Patient is on day 3 of the antibiotics.  Advised to finish course of antibiotics.  Patient may follow the call.

## 2019-03-28 ENCOUNTER — APPOINTMENT (RX ONLY)
Dept: URBAN - METROPOLITAN AREA CLINIC 35 | Facility: CLINIC | Age: 79
Setting detail: DERMATOLOGY
End: 2019-03-28

## 2019-03-28 VITALS — SYSTOLIC BLOOD PRESSURE: 124 MMHG | DIASTOLIC BLOOD PRESSURE: 82 MMHG | TEMPERATURE: 97.4 F

## 2019-03-28 DIAGNOSIS — L85.3 XEROSIS CUTIS: ICD-10-CM

## 2019-03-28 DIAGNOSIS — L0390 CELLULITIS AND ABSCESS OF UNSPECIFIED SITES: ICD-10-CM

## 2019-03-28 DIAGNOSIS — L0391 CELLULITIS AND ABSCESS OF UNSPECIFIED SITES: ICD-10-CM

## 2019-03-28 PROBLEM — L03.115 CELLULITIS OF RIGHT LOWER LIMB: Status: ACTIVE | Noted: 2019-03-28

## 2019-03-28 PROCEDURE — ? COUNSELING

## 2019-03-28 PROCEDURE — 99211 OFF/OP EST MAY X REQ PHY/QHP: CPT

## 2019-03-28 PROCEDURE — ? ADDITIONAL NOTES

## 2019-03-28 ASSESSMENT — LOCATION SIMPLE DESCRIPTION DERM: LOCATION SIMPLE: RIGHT PRETIBIAL REGION

## 2019-03-28 ASSESSMENT — LOCATION ZONE DERM: LOCATION ZONE: LEG

## 2019-03-28 ASSESSMENT — LOCATION DETAILED DESCRIPTION DERM: LOCATION DETAILED: RIGHT DISTAL PRETIBIAL REGION

## 2019-03-28 NOTE — PROCEDURE: ADDITIONAL NOTES
Additional Notes: - Continue cleansing wound once daily with Epicyn Antimicrobial Cleanser \\n- Continue applying Nystatin powder to the wound base twice a day then apply a thin layer of Vaseline on top and occlude with a bandage. \\n- Recommend to keep leg elevated when possible. \\n\\nPer 3/19/19 note: At this point it looks like the candida was pathogenic and has significantly resolved with topical treatment. \\n* 2/20/19 Culture Results: Candida parapsiliosis
Detail Level: Detailed
Detail Level: Simple
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  She is now only applying the powder to the wound base.\\n- Continue applying Drexeryl cream two to three times daily to affected areas on the leg, avoid wound base.
Additional Notes: Patient declines scheduling an appointment for wound check today with the other [facility] offices. \\nPatient was advised to call the [facility] office if her symptoms worsens, increased pain, swelling, drainage, red streaking spreading from the wound and or she develops chills fever. \\n\\nNo providers available at [facility] location today.

## 2019-04-17 ENCOUNTER — APPOINTMENT (RX ONLY)
Dept: URBAN - METROPOLITAN AREA CLINIC 35 | Facility: CLINIC | Age: 79
Setting detail: DERMATOLOGY
End: 2019-04-17

## 2019-04-17 DIAGNOSIS — L57.0 ACTINIC KERATOSIS: ICD-10-CM

## 2019-04-17 DIAGNOSIS — T81.89 OTHER COMPLICATIONS OF PROCEDURES, NOT ELSEWHERE CLASSIFIED: ICD-10-CM

## 2019-04-17 DIAGNOSIS — Z41.9 ENCOUNTER FOR PROCEDURE FOR PURPOSES OTHER THAN REMEDYING HEALTH STATE, UNSPECIFIED: ICD-10-CM

## 2019-04-17 PROBLEM — T81.89XA OTHER COMPLICATIONS OF PROCEDURES, NOT ELSEWHERE CLASSIFIED, INITIAL ENCOUNTER: Status: ACTIVE | Noted: 2019-04-17

## 2019-04-17 PROCEDURE — 17003 DESTRUCT PREMALG LES 2-14: CPT

## 2019-04-17 PROCEDURE — ? KYBELLA INJECTION

## 2019-04-17 PROCEDURE — ? LIQUID NITROGEN

## 2019-04-17 PROCEDURE — ? COUNSELING

## 2019-04-17 PROCEDURE — ? FILLERS

## 2019-04-17 PROCEDURE — 17000 DESTRUCT PREMALG LESION: CPT

## 2019-04-17 PROCEDURE — ? ADDITIONAL NOTES

## 2019-04-17 PROCEDURE — 99212 OFFICE O/P EST SF 10 MIN: CPT | Mod: 25

## 2019-04-17 ASSESSMENT — LOCATION ZONE DERM
LOCATION ZONE: FACE
LOCATION ZONE: FACE
LOCATION ZONE: LEG

## 2019-04-17 ASSESSMENT — LOCATION SIMPLE DESCRIPTION DERM
LOCATION SIMPLE: RIGHT SUBMANDIBULAR AREA
LOCATION SIMPLE: LEFT CHEEK
LOCATION SIMPLE: RIGHT PRETIBIAL REGION
LOCATION SIMPLE: LEFT SUBMANDIBULAR AREA

## 2019-04-17 ASSESSMENT — LOCATION DETAILED DESCRIPTION DERM
LOCATION DETAILED: LEFT SUPERIOR MEDIAL MALAR CHEEK
LOCATION DETAILED: LEFT MEDIAL MALAR CHEEK
LOCATION DETAILED: RIGHT DISTAL PRETIBIAL REGION
LOCATION DETAILED: RIGHT SUBMANDIBULAR AREA
LOCATION DETAILED: LEFT SUBMANDIBULAR AREA

## 2019-04-17 NOTE — PROCEDURE: LIQUID NITROGEN
Post-Care Instructions: I reviewed with the patient in detail post-care instructions. Patient is to wear sunprotection, and avoid picking at any of the treated lesions. Pt may apply Vaseline to crusted or scabbing areas.
Number Of Freeze-Thaw Cycles: 2 freeze-thaw cycles
Consent: The patient's consent was obtained including but not limited to risks of crusting, scabbing, blistering, scarring, darker or lighter pigmentary change, recurrence, incomplete removal and infection.
Render Post-Care Instructions In Note?: no
Detail Level: Detailed
Duration Of Freeze Thaw-Cycle (Seconds): 2

## 2019-04-17 NOTE — PROCEDURE: FILLERS
Nasolabial Folds Filler Volume In Cc: 0
Additional Area 5 Location: Earlobes
Price (Use Numbers Only, No Special Characters Or $): 890
Use Map Statement For Sites (Optional): Yes
Additional Area 1 Location: Oral Commisures
Additional Area 3 Location: Fine lines around mouth
Include Cannula Information In Note?: No
Cheeks Filler Volume In Cc: 0.3
Additional Area 2 Location: Border of lips
Additional Area 4 Location: Scars
Map Statment: See Attach Map for Complete Details
Additional Area 1 Volume In Cc: 0.1
Consent: Written consent obtained. Risks include but not limited to bruising, beading, irregular texture, ulceration, infection, allergic reaction, scar formation, incomplete augmentation, temporary nature, procedural pain.
Lot #: [id number]
Detail Level: Detailed
Filler Comments: 0.3cc of Juvederm was blended 1:2 with lidocaine no epinephrine and stranded to cheeks.
Expiration Date (Month Year): 2020.02.25
Vermilion Lips Filler Volume In Cc: 0.5
Post-Care Instructions: Patient instructed to apply ice to reduce swelling.
Filler: Juvederm Ultra XC

## 2019-04-17 NOTE — PROCEDURE: KYBELLA INJECTION
Consent: Written consent obtained. The following temporary side effects commonly occur during/after Kybella injections and are to be expected:\\n• Redness/swelling\\n• Bruising\\n• Discomfort\\n• Numbness\\n• Induration (hardening or firmness)\\n• Infection\\nMore rare but important side effects also include:\\n• Temporary injury to the marginal mandibular nerve (results in an uneven smile) was noted in 4% of study patients.\\n• Temporary discomfort and/or difficulty swallowing was noted in 2%.\\nThe Kybella procedure should not be performed on you if you:\\n• Have an active infection of the skin in the treatment area.\\n• Have current or prior history of difficulty swallowing or pain with swallowing.\\n• Are pregnant or breastfeeding.
Lot #: [id number]
Anesthesia Volume In Cc: 0
Expiration Date (Month Year): 11.2020
Procedure Text: The treatment areas were cleansed with alcohol. Kybella was administered using the parameters mentioned above.
Kybella Volume In Cc (Won't Render If 0): 2.2
Price (Use Numbers Only, No Special Characters Or $): 862
Detail Level: Detailed
Treatment Area: [facility]
Treatment Number (Won't Render If 0): 1
Post-Care Instructions: Patient instructed to apply ice to reduce swelling.

## 2019-04-24 ENCOUNTER — HOSPITAL ENCOUNTER (OUTPATIENT)
Facility: MEDICAL CENTER | Age: 79
End: 2019-04-24
Attending: NURSE PRACTITIONER
Payer: MEDICARE

## 2019-04-24 ENCOUNTER — NON-PROVIDER VISIT (OUTPATIENT)
Dept: WOUND CARE | Facility: MEDICAL CENTER | Age: 79
End: 2019-04-24
Attending: DERMATOLOGY
Payer: MEDICARE

## 2019-04-24 DIAGNOSIS — L08.9 WOUND INFECTION: ICD-10-CM

## 2019-04-24 DIAGNOSIS — L53.9 ERYTHEMA: ICD-10-CM

## 2019-04-24 DIAGNOSIS — T14.8XXA WOUND INFECTION: ICD-10-CM

## 2019-04-24 LAB
GRAM STN SPEC: NORMAL
SIGNIFICANT IND 70042: NORMAL
SITE SITE: NORMAL
SOURCE SOURCE: NORMAL

## 2019-04-24 PROCEDURE — 87077 CULTURE AEROBIC IDENTIFY: CPT

## 2019-04-24 PROCEDURE — 87205 SMEAR GRAM STAIN: CPT

## 2019-04-24 PROCEDURE — 87070 CULTURE OTHR SPECIMN AEROBIC: CPT

## 2019-04-24 PROCEDURE — 99211 OFF/OP EST MAY X REQ PHY/QHP: CPT

## 2019-04-24 PROCEDURE — 87186 SC STD MICRODIL/AGAR DIL: CPT

## 2019-04-24 NOTE — PATIENT INSTRUCTIONS
-Keep dressings clean, dry and covered while bathing. Only change dressings if they become over saturated, soiled or fall off.     -Should you experience any significant changes in your wound(s), such as infection (redness, swelling, localized heat, increased pain, fever > 101 F, chills) or have any questions regarding your home care instructions, please contact the wound center at [phone]. If after hours, contact your primary care physician or go to the hospital emergency room.

## 2019-04-24 NOTE — CERTIFICATION
Non Provider Encounter- Full Thickness wound    HISTORY OF PRESENT ILLNESS      START OF CARE IN CLINIC:4/24/2019  REFERRING PROVIDER: KEITH Kim    WOUND- Full thickness wound  LOCATION: Right Anterior Shin    WOUND HISTORY:   Patient is a 79 year old female with a right anterior shin wound. 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 The patient reports that all of the cancer was removed, but pathology reports are not available for review at this time. Since the biopsy, the shin wound has not healed. The patient was treated with antibiotics by her Dermatologist, and was instructed to cover the wound with petroleum. The patient reports that she had three wound cultures which all showed yeast in the wound, but no bacteria. She was treated for the yeast with Nystatin powder by her dermatologist. For the last few days, the patient has been leaving the wound open to air.     PERTINENT PMH: Squamous Cell Carcinoma     IMAGING:None  VASCULAR STUDIES: None    LAST  WOUND CULTURE: Culture taken 4/24/2019                     DIABETES: NO  MOST RECENT A1C: N/A    TOBACCO USE: NEVER SMOKER    RESULTS:     FALL RISK ASSESSMENT:   X  65 years or older        Fall within the last 2 years      Uses ambulatory devices     Loss of protective sensation in feet      Use of prostethic/orthotic       Presence of lower extremity/foot/toe amputation      Taking medication that increases risk (per facility policy)    Interventions Recommended (if any of the above are selected): Completed 4/24/2019, low fall risk.   Use of Assistive Device: None   Supervision with ambulation: Independent   Assistance with ambulation: Independent    PAST MEDICAL HISTORY:   Past Medical History:   Diagnosis Date   • Diverticulosis    • Hypothyroid    • IBS (irritable bowel syndrome)    • Migraines    • Mitral valve prolapse        PAST SURGICAL HISTORY:   Past Surgical History:    Procedure Laterality Date   • ABDOMINAL HYSTERECTOMY TOTAL      Hysterectomy, Total Abdominal   • PB ENLARGE BREAST WITH IMPLANT     • US-NEEDLE CORE BX-BREAST PANEL          MEDICATIONS:   Current Outpatient Prescriptions   Medication   • FLUoxetine (PROZAC) 20 MG Cap   • levothyroxine (SYNTHROID) 150 MCG Tab   • estradiol (CLIMARA) 0.025 MG/24HR PATCH WEEKLY   • Aspirin-Acetaminophen-Caffeine (EXCEDRIN PO)     No current facility-administered medications for this visit.        ALLERGIES:    Allergies   Allergen Reactions   • Macrobid [Nitrofurantoin Monohydrate Macrocrystals] Nausea     Cramp in legs         SOCIAL HISTORY:   Social History     Social History   • Marital status: Single     Spouse name: N/A   • Number of children: N/A   • Years of education: N/A     Social History Main Topics   • Smoking status: Never Smoker   • Smokeless tobacco: Never Used   • Alcohol use 0.6 oz/week     1 Glasses of wine per week      Comment: wine daily   • Drug use: No   • Sexual activity: Not on file      Comment: , two daughters,      Other Topics Concern   • Not on file     Social History Narrative   • No narrative on file       FAMILY HISTORY: No family history on file.    Wound Assessment:    Wound 04/24/19 Full Thickness Wound Leg -Right Anterior Shin (Active)   Wound Image   4/24/2019  9:15 AM   Site Assessment Red;Yellow 4/24/2019  9:15 AM   Carolyn-wound Assessment Blanchable erythema 4/24/2019  9:15 AM   Margins Attached edges 4/24/2019  9:15 AM   Wound Length (cm) 1.2 cm 4/24/2019  9:15 AM   Wound Width (cm) 0.9 cm 4/24/2019  9:15 AM   Wound Depth (cm) 0.3 cm 4/24/2019  9:15 AM   Wound Surface Area (cm^2) 1.08 cm^2 4/24/2019  9:15 AM   Drainage Amount Moderate 4/24/2019  9:15 AM   Drainage Description Serosanguineous 4/24/2019  9:15 AM   Non-staged Wound Description Full thickness 4/24/2019  9:15 AM   Treatments Cleansed 4/24/2019  9:15 AM   Cleansing Normal Saline Irrigation 4/24/2019  9:15 AM    Periwound Protectant Barrier Paste;Skin Protectant wipes to Periwound 4/24/2019  9:15 AM   Dressing Options Honey Colloid;Adhesive Foam 4/24/2019  9:15 AM   Dressing Changed New 4/24/2019  9:15 AM   WOUND NURSE ONLY - Odor None 4/24/2019  9:15 AM   WOUND NURSE ONLY - Pulses 1+;DP 4/24/2019  9:15 AM   WOUND NURSE ONLY - Exposed Structures Other (Comments) 4/24/2019  9:15 AM   WOUND NURSE ONLY - Tissue Type and Percentage 5% red moist, 95% yellow adherent. 4/24/2019  9:15 AM     Pre-debridement Photo        Procedures:     Debridement: None today (pathology report from skin cancer removal not yet obtained from Dermatology office).    PATIENT EDUCATION  Patient educated on plan of care and rationale behind dressing selections. Reviewed the s/s of infection and when to present to the ER (fever, chills, nausea/vomiting, redness that spreads from the wound, sudden increased drainage or pain), leaving dressing clean/dry/intact, and when to change the dressing (if it becomes soiled, soaked, or falls off). Patient verbalized understanding to all instructions.

## 2019-04-24 NOTE — WOUND TEAM
Supplies order faxed to [facility] at fax #[phone].             Wound 04/24/19 Full Thickness Wound Leg -Right Anterior Shin (Active)   Wound Image   4/24/2019  9:15 AM   Site Assessment Red;Yellow 4/24/2019  9:15 AM   Carolyn-wound Assessment Blanchable erythema 4/24/2019  9:15 AM   Margins Attached edges 4/24/2019  9:15 AM   Wound Length (cm) 1.2 cm 4/24/2019  9:15 AM   Wound Width (cm) 0.9 cm 4/24/2019  9:15 AM   Wound Depth (cm) 0.3 cm 4/24/2019  9:15 AM   Wound Surface Area (cm^2) 1.08 cm^2 4/24/2019  9:15 AM   Drainage Amount Moderate 4/24/2019  9:15 AM   Drainage Description Serosanguineous 4/24/2019  9:15 AM   Non-staged Wound Description Full thickness 4/24/2019  9:15 AM   Treatments Cleansed 4/24/2019  9:15 AM   Cleansing Normal Saline Irrigation 4/24/2019  9:15 AM   Periwound Protectant Barrier Paste;Skin Protectant wipes to Periwound 4/24/2019  9:15 AM   Dressing Options Honey Colloid;Adhesive Foam 4/24/2019  9:15 AM   Dressing Changed New 4/24/2019  9:15 AM   WOUND NURSE ONLY - Odor None 4/24/2019  9:15 AM   WOUND NURSE ONLY - Pulses 1+;DP 4/24/2019  9:15 AM   WOUND NURSE ONLY - Exposed Structures Other (Comments) 4/24/2019  9:15 AM   WOUND NURSE ONLY - Tissue Type and Percentage 5% red moist, 95% yellow adherent. 4/24/2019  9:15 AM

## 2019-04-26 ENCOUNTER — APPOINTMENT (RX ONLY)
Dept: URBAN - METROPOLITAN AREA CLINIC 35 | Facility: CLINIC | Age: 79
Setting detail: DERMATOLOGY
End: 2019-04-26

## 2019-04-26 DIAGNOSIS — T81.89 OTHER COMPLICATIONS OF PROCEDURES, NOT ELSEWHERE CLASSIFIED: ICD-10-CM

## 2019-04-26 PROBLEM — T81.89XA OTHER COMPLICATIONS OF PROCEDURES, NOT ELSEWHERE CLASSIFIED, INITIAL ENCOUNTER: Status: ACTIVE | Noted: 2019-04-26

## 2019-04-26 LAB
BACTERIA WND AEROBE CULT: ABNORMAL
BACTERIA WND AEROBE CULT: ABNORMAL
GRAM STN SPEC: ABNORMAL
SIGNIFICANT IND 70042: ABNORMAL
SITE SITE: ABNORMAL
SOURCE SOURCE: ABNORMAL

## 2019-04-26 PROCEDURE — 99024 POSTOP FOLLOW-UP VISIT: CPT

## 2019-04-26 PROCEDURE — ? TREATMENT REGIMEN

## 2019-04-26 PROCEDURE — ? ADDITIONAL NOTES

## 2019-04-26 PROCEDURE — ? COUNSELING

## 2019-04-26 NOTE — PROCEDURE: TREATMENT REGIMEN
Continue Regimen: Keep all appointments with wound care.  Referred to Dr. Placido Miranda with infectious disease by Micaela Yarbrough.  Faxed demographics and insurance information to Dr Miranda’s office.
Detail Level: Zone

## 2019-04-29 ENCOUNTER — HOSPITAL ENCOUNTER (OUTPATIENT)
Dept: LAB | Facility: MEDICAL CENTER | Age: 79
End: 2019-04-29
Attending: INTERNAL MEDICINE
Payer: MEDICARE

## 2019-04-29 LAB
ALBUMIN SERPL BCP-MCNC: 4.2 G/DL (ref 3.2–4.9)
ALBUMIN/GLOB SERPL: 1.4 G/DL
ALP SERPL-CCNC: 68 U/L (ref 30–99)
ALT SERPL-CCNC: 19 U/L (ref 2–50)
ANION GAP SERPL CALC-SCNC: 8 MMOL/L (ref 0–11.9)
AST SERPL-CCNC: 22 U/L (ref 12–45)
BASOPHILS # BLD AUTO: 1.2 % (ref 0–1.8)
BASOPHILS # BLD: 0.06 K/UL (ref 0–0.12)
BILIRUB SERPL-MCNC: 0.4 MG/DL (ref 0.1–1.5)
BUN SERPL-MCNC: 13 MG/DL (ref 8–22)
CALCIUM SERPL-MCNC: 9.8 MG/DL (ref 8.5–10.5)
CHLORIDE SERPL-SCNC: 101 MMOL/L (ref 96–112)
CO2 SERPL-SCNC: 29 MMOL/L (ref 20–33)
CREAT SERPL-MCNC: 0.68 MG/DL (ref 0.5–1.4)
CRP SERPL HS-MCNC: 0.32 MG/DL (ref 0–0.75)
EOSINOPHIL # BLD AUTO: 0.12 K/UL (ref 0–0.51)
EOSINOPHIL NFR BLD: 2.3 % (ref 0–6.9)
ERYTHROCYTE [DISTWIDTH] IN BLOOD BY AUTOMATED COUNT: 43.6 FL (ref 35.9–50)
ERYTHROCYTE [SEDIMENTATION RATE] IN BLOOD BY WESTERGREN METHOD: 3 MM/HOUR (ref 0–30)
GLOBULIN SER CALC-MCNC: 2.9 G/DL (ref 1.9–3.5)
GLUCOSE SERPL-MCNC: 86 MG/DL (ref 65–99)
HCT VFR BLD AUTO: 43.5 % (ref 37–47)
HGB BLD-MCNC: 14 G/DL (ref 12–16)
IMM GRANULOCYTES # BLD AUTO: 0.01 K/UL (ref 0–0.11)
IMM GRANULOCYTES NFR BLD AUTO: 0.2 % (ref 0–0.9)
LYMPHOCYTES # BLD AUTO: 1.81 K/UL (ref 1–4.8)
LYMPHOCYTES NFR BLD: 34.9 % (ref 22–41)
MCH RBC QN AUTO: 31.5 PG (ref 27–33)
MCHC RBC AUTO-ENTMCNC: 32.2 G/DL (ref 33.6–35)
MCV RBC AUTO: 98 FL (ref 81.4–97.8)
MONOCYTES # BLD AUTO: 0.35 K/UL (ref 0–0.85)
MONOCYTES NFR BLD AUTO: 6.8 % (ref 0–13.4)
NEUTROPHILS # BLD AUTO: 2.83 K/UL (ref 2–7.15)
NEUTROPHILS NFR BLD: 54.6 % (ref 44–72)
NRBC # BLD AUTO: 0 K/UL
NRBC BLD-RTO: 0 /100 WBC
PLATELET # BLD AUTO: 316 K/UL (ref 164–446)
PMV BLD AUTO: 9 FL (ref 9–12.9)
POTASSIUM SERPL-SCNC: 4.4 MMOL/L (ref 3.6–5.5)
PROT SERPL-MCNC: 7.1 G/DL (ref 6–8.2)
RBC # BLD AUTO: 4.44 M/UL (ref 4.2–5.4)
SODIUM SERPL-SCNC: 138 MMOL/L (ref 135–145)
WBC # BLD AUTO: 5.2 K/UL (ref 4.8–10.8)

## 2019-04-29 PROCEDURE — 86140 C-REACTIVE PROTEIN: CPT

## 2019-04-29 PROCEDURE — 80053 COMPREHEN METABOLIC PANEL: CPT

## 2019-04-29 PROCEDURE — 85652 RBC SED RATE AUTOMATED: CPT

## 2019-04-29 PROCEDURE — 36415 COLL VENOUS BLD VENIPUNCTURE: CPT

## 2019-04-29 PROCEDURE — 85025 COMPLETE CBC W/AUTO DIFF WBC: CPT

## 2019-05-01 ENCOUNTER — APPOINTMENT (RX ONLY)
Dept: URBAN - METROPOLITAN AREA CLINIC 35 | Facility: CLINIC | Age: 79
Setting detail: DERMATOLOGY
End: 2019-05-01

## 2019-05-01 DIAGNOSIS — T81.89 OTHER COMPLICATIONS OF PROCEDURES, NOT ELSEWHERE CLASSIFIED: ICD-10-CM

## 2019-05-01 PROBLEM — T81.89XA OTHER COMPLICATIONS OF PROCEDURES, NOT ELSEWHERE CLASSIFIED, INITIAL ENCOUNTER: Status: ACTIVE | Noted: 2019-05-01

## 2019-05-01 PROCEDURE — ? COUNSELING

## 2019-05-01 PROCEDURE — ? DRESSING CHANGE

## 2019-05-01 PROCEDURE — 99024 POSTOP FOLLOW-UP VISIT: CPT

## 2019-05-01 PROCEDURE — ? ADDITIONAL NOTES

## 2019-05-01 ASSESSMENT — LOCATION DETAILED DESCRIPTION DERM: LOCATION DETAILED: RIGHT PROXIMAL PRETIBIAL REGION

## 2019-05-01 ASSESSMENT — LOCATION SIMPLE DESCRIPTION DERM: LOCATION SIMPLE: RIGHT PRETIBIAL REGION

## 2019-05-01 ASSESSMENT — LOCATION ZONE DERM: LOCATION ZONE: LEG

## 2019-05-01 NOTE — PROCEDURE: DRESSING CHANGE
Detail Level: Detailed
Wound Evaluated By: Micaela Yarbrough NP
Add 70419 Cpt? (Important Note: In 2017 The Use Of 39818 Is Being Tracked By Cms To Determine Future Global Period Reimbursement For Global Periods): no
Free Text Wound Car (Will Override Above): Cleansed wound today with Levicyn gel.

## 2019-05-01 NOTE — PROCEDURE: ADDITIONAL NOTES
Additional Notes: Will request recent wound culture results from Dr. Grande (Infectious Disease) and schedule follow up appointment with Wound Care. \\nContinue follow up appointments with [facility] Infectious Disease and [facility]ound [facility].
Detail Level: Zone

## 2019-05-02 ENCOUNTER — TELEPHONE (OUTPATIENT)
Dept: MEDICAL GROUP | Facility: MEDICAL CENTER | Age: 79
End: 2019-05-02

## 2019-05-02 DIAGNOSIS — E03.9 HYPOTHYROIDISM, UNSPECIFIED TYPE: ICD-10-CM

## 2019-05-02 DIAGNOSIS — E78.00 PURE HYPERCHOLESTEROLEMIA: ICD-10-CM

## 2019-05-02 NOTE — TELEPHONE ENCOUNTER
----- Message from Christina Dover, Med Ass't sent at 5/2/2019  3:36 PM PDT -----  Regarding: LAB ORDER  Contact: [phone]  Patient scheduled an appointment for her annual exam. But would like to know if she can get a lab order?

## 2019-05-06 ENCOUNTER — OFFICE VISIT (OUTPATIENT)
Dept: WOUND CARE | Facility: MEDICAL CENTER | Age: 79
End: 2019-05-06
Attending: DERMATOLOGY
Payer: MEDICARE

## 2019-05-06 VITALS
SYSTOLIC BLOOD PRESSURE: 141 MMHG | OXYGEN SATURATION: 96 % | RESPIRATION RATE: 18 BRPM | HEART RATE: 92 BPM | TEMPERATURE: 98.7 F | DIASTOLIC BLOOD PRESSURE: 79 MMHG

## 2019-05-06 DIAGNOSIS — R60.0 LEG EDEMA, RIGHT: ICD-10-CM

## 2019-05-06 DIAGNOSIS — L30.9 DERMATITIS: ICD-10-CM

## 2019-05-06 DIAGNOSIS — S81.801D OPEN WOUND OF RIGHT LOWER LEG, SUBSEQUENT ENCOUNTER: ICD-10-CM

## 2019-05-06 PROBLEM — S81.801A OPEN WOUND OF RIGHT LOWER LEG: Status: ACTIVE | Noted: 2019-05-06

## 2019-05-06 PROCEDURE — 11042 DBRDMT SUBQ TIS 1ST 20SQCM/<: CPT

## 2019-05-06 PROCEDURE — 11042 DBRDMT SUBQ TIS 1ST 20SQCM/<: CPT | Performed by: NURSE PRACTITIONER

## 2019-05-06 ASSESSMENT — ENCOUNTER SYMPTOMS
CLAUDICATION: 0
VOMITING: 0
SHORTNESS OF BREATH: 0
NAUSEA: 0
COUGH: 0
DIARRHEA: 0
FEVER: 0
DEPRESSION: 0
CONSTIPATION: 0
CHILLS: 0

## 2019-05-06 NOTE — PATIENT INSTRUCTIONS
Keep dressing clean and dry and cover while bathing. Only change dressing if over saturated, soiled or its falling off.     Should you experience any significant changes in your wound(s) such as infection (redness, swelling, localized heat, increased pain, fever >101 F, chills) or have any questions regarding your home care instructions, please contact the wound center [phone]. If after hours, contact your primary care physician or go the hospital emergency room.

## 2019-05-06 NOTE — NON-PROVIDER
Attempted to contact pt's dermatologist, Yue JEAN or Dr. Dickson. However they were not available in the office. Left VM with their MA to request document stating the margin is clear.

## 2019-05-14 ENCOUNTER — OFFICE VISIT (OUTPATIENT)
Dept: WOUND CARE | Facility: MEDICAL CENTER | Age: 79
End: 2019-05-14
Attending: DERMATOLOGY
Payer: MEDICARE

## 2019-05-14 VITALS
DIASTOLIC BLOOD PRESSURE: 73 MMHG | TEMPERATURE: 98.8 F | OXYGEN SATURATION: 99 % | HEART RATE: 74 BPM | SYSTOLIC BLOOD PRESSURE: 129 MMHG | RESPIRATION RATE: 18 BRPM

## 2019-05-14 DIAGNOSIS — S81.801D OPEN WOUND OF RIGHT LOWER LEG, SUBSEQUENT ENCOUNTER: ICD-10-CM

## 2019-05-14 DIAGNOSIS — L30.9 DERMATITIS: ICD-10-CM

## 2019-05-14 DIAGNOSIS — R60.0 LEG EDEMA, RIGHT: ICD-10-CM

## 2019-05-14 PROCEDURE — 11042 DBRDMT SUBQ TIS 1ST 20SQCM/<: CPT

## 2019-05-14 PROCEDURE — 11042 DBRDMT SUBQ TIS 1ST 20SQCM/<: CPT | Performed by: NURSE PRACTITIONER

## 2019-05-14 RX ORDER — TRIAMCINOLONE ACETONIDE 1 MG/G
1 OINTMENT TOPICAL
Qty: 1 TUBE | Refills: 0 | Status: SHIPPED | OUTPATIENT
Start: 2019-05-14 | End: 2020-08-14

## 2019-05-14 ASSESSMENT — ENCOUNTER SYMPTOMS
VOMITING: 0
CONSTIPATION: 0
DIARRHEA: 0
CLAUDICATION: 0
COUGH: 0
CHILLS: 0
FEVER: 0
SHORTNESS OF BREATH: 0
DEPRESSION: 0
NAUSEA: 0

## 2019-05-14 ASSESSMENT — PAIN SCALES - GENERAL: PAINLEVEL: 3=SLIGHT PAIN

## 2019-05-14 NOTE — PROGRESS NOTES
"Provider Encounter- Full Thickness wound    HISTORY OF PRESENT ILLNESS   START OF CARE IN CLINIC:4/24/2019  REFERRING PROVIDER: KEITH Kim     WOUND- Full thickness wound  LOCATION: Right Anterior Shin     WOUND HISTORY:   Patient is a 79 year old female with a right anterior shin wound. 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 The patient reports that,, \"they did not clean margins, so they went back again\".  Presumably all of the cancer was removed, but pathology reports that sent over from dermatology.  Since the biopsy, the shin wound has not healed.   The patient reports that she had three wound cultures which all showed yeast in the wound, but no bacteria.  Nonetheless,  she was given a couple of shots of Rocephin, after which her wound improved. She was treated for the yeast with Nystatin powder by her dermatologist.      PERTINENT PMH: Squamous Cell Carcinoma      IMAGING:None  VASCULAR STUDIES: None               LAST  WOUND CULTURE: 4/24/19     RESULTS:  Heavy growth staph intermedius                                         DIABETES: NO  MOST RECENT A1C: N/A     TOBACCO USE: NEVER SMOKER        5/6: Initial provider assessment.  Patient states she was seen by Dr. Johnson with severe infectious diseases last week, Dr. Johnson did not want to prescribe antibiotics.  An MRI was also ordered by Dr. Johnson to rule out osteomyelitis, and arterial studies to assess lower extremity perfusion.  Patient is scheduled to return to ID in the next week or 2.  Periwound irritation noted, patient states she used a regular Band-Aid over the wound, and this irritated her skin.  Wound debrided in clinic.    5/14: Total wound area has decreased by more than 50%.  Patient states that TAC 1% ointment applied to periwound last clinic visit was very helpful.  Rx requested by patient, sent to patient's pharmacy.  She has not yet completed MRI or arterial studies " as ordered by Dr. Johnson.        PAST MEDICAL HISTORY:   Past Medical History:   Diagnosis Date   • Diverticulosis    • Hypothyroid    • IBS (irritable bowel syndrome)    • Migraines    • Mitral valve prolapse        PAST SURGICAL HISTORY:   Past Surgical History:   Procedure Laterality Date   • ABDOMINAL HYSTERECTOMY TOTAL      Hysterectomy, Total Abdominal   • PB ENLARGE BREAST WITH IMPLANT     • US-NEEDLE CORE BX-BREAST PANEL          MEDICATIONS:   Current Outpatient Prescriptions   Medication   • FLUoxetine (PROZAC) 20 MG Cap   • levothyroxine (SYNTHROID) 150 MCG Tab   • estradiol (CLIMARA) 0.025 MG/24HR PATCH WEEKLY   • Aspirin-Acetaminophen-Caffeine (EXCEDRIN PO)     No current facility-administered medications for this visit.        ALLERGIES:    Allergies   Allergen Reactions   • Macrobid [Nitrofurantoin Monohydrate Macrocrystals] Nausea     Cramp in legs         SOCIAL HISTORY:   Social History     Social History   • Marital status: Single     Spouse name: N/A   • Number of children: N/A   • Years of education: N/A     Social History Main Topics   • Smoking status: Never Smoker   • Smokeless tobacco: Never Used   • Alcohol use 0.6 oz/week     1 Glasses of wine per week      Comment: wine daily   • Drug use: No   • Sexual activity: Not on file      Comment: , two daughters,      Other Topics Concern   • Not on file     Social History Narrative   • No narrative on file       FAMILY HISTORY: History reviewed. No pertinent family history.     REVIEW OF SYSTEMS:   Review of Systems   Constitutional: Negative for chills and fever.   Respiratory: Negative for cough and shortness of breath.    Cardiovascular: Positive for leg swelling. Negative for chest pain and claudication.   Gastrointestinal: Negative for constipation, diarrhea, nausea and vomiting.   Genitourinary: Negative for dysuria.   Musculoskeletal: Negative for joint pain.   Psychiatric/Behavioral: Negative for depression.        PHYSICAL EXAMINATION:   There were no vitals taken for this visit.  Physical Exam   Constitutional: She is oriented to person, place, and time and well-developed, well-nourished, and in no distress.   Thin, undernourished   HENT:   Head: Normocephalic.   Eyes: Pupils are equal, round, and reactive to light.   Cardiovascular: Intact distal pulses.    Strong, palpable pedal pulses   Pulmonary/Chest: Effort normal.   Musculoskeletal: Normal range of motion.   Nonpitting edema of right lower extremity   Neurological: She is alert and oriented to person, place, and time.   Skin: Skin is warm.   Open wound to anterior right shin  Refer to flowsheet and photos   Psychiatric: Mood, memory, affect and judgment normal.       Wound Assessment  Wound 04/24/19 Full Thickness Wound Leg -Right Anterior Shin (Active)   Wound Image    5/14/2019  1:38 PM   Site Assessment Yellow 5/14/2019  1:38 PM   Carolyn-wound Assessment Blanchable erythema;Fragile;Red 5/14/2019  1:38 PM   Margins Attached edges 5/14/2019  1:38 PM   Wound Length (cm) 0.6 cm 5/6/2019  4:00 PM   Wound Width (cm) 0.4 cm 5/6/2019  4:00 PM   Wound Depth (cm) 0.3 cm 5/6/2019  4:00 PM   Wound Surface Area (cm^2) 0.24 cm^2 5/6/2019  4:00 PM   Post Wound Length (cm) 0.4 cm 5/14/2019  1:38 PM    Post Wound Width (cm) 0.3 cm 5/14/2019  1:38 PM   Post Wound Depth (cm) 0.1 cm 5/14/2019  1:38 PM   Post Wound Surface Area (cm^2) 0.12 cm^2 5/14/2019  1:38 PM   Tunneling 0 cm 5/14/2019  1:38 PM   Undermining 0 cm 5/14/2019  1:38 PM   Drainage Amount Small 5/14/2019  1:38 PM   Drainage Description Serosanguineous 5/14/2019  1:38 PM   Non-staged Wound Description Full thickness 5/14/2019  1:38 PM   Treatments Cleansed;Pharmaceutical agent 5/14/2019  1:38 PM   Cleansing Normal Saline Irrigation 5/14/2019  1:38 PM   Periwound Protectant Skin Protectant wipes to Periwound 5/14/2019  1:38 PM   Dressing Options Honey Alginate;Adhesive Foam 5/14/2019  1:38 PM   Dressing Changed Changed  5/14/2019  1:38 PM   Dressing Status Clean;Dry;Intact 5/14/2019  1:38 PM   Dressing Change Frequency Every 72 hrs 5/14/2019  1:38 PM   WOUND NURSE ONLY - Odor None 5/14/2019  1:38 PM   WOUND NURSE ONLY - Pulses 1+;DP 5/14/2019  1:38 PM   WOUND NURSE ONLY - Exposed Structures None 5/14/2019  1:38 PM   WOUND NURSE ONLY - Tissue Type and Percentage 100% red moist 5/14/2019  1:38 PM             Pre-debridement Photo        PROCEDURE  -2% viscous lidocaine applied topically to wound bed for approximately 5 minutes prior to debridement  -  4m curette used to debride wound bed..  Excisional debridement was performed to remove devitalized tissue until healthy, bleeding tissue was visualized.   Entire surface of wound, 0.12 cm2, debrided  Tissue debrided into the subcutaneous layer.   -Bleeding controlled with manual pressure   -Wound care completed by  Caro Gomez RN    Post-debridement Photo            PATIENT EDUCATION  -Advised to go to ER for any increased redness, swelling, drainage or odor, or if patient develops fever, chills, nausea or vomiting.  -Importance of adequate nutrition for wound healing  -Increase protein intake (unless contraindicated by renal status)    ASSESSMENT AND PLAN:   1. Open wound of right lower leg, subsequent encounter  Comments: Open wound due to Mohs procedure, nonhealing.    5/14: Wound area has decreased significantly per  -Excisional debridement in clinic today, medically necessary to promote wound healing  -Patient to return to clinic weekly for assessment debridement  -She is to change the dressing herself 1-2 times in between clinic visits.  -Follow-up with infectious diseases as scheduled   Wound care: Honey alginate to promote autolytic debridement and to manage bioburden, silicone foam cover dressing      2. Dermatitis    5/14: Irritation to periwound, presumably caused by allergic reaction to adhesive from Band-Aid placed by patient  -TAC 1% to affected areas prior to  placement of new dressing  -Silicone foam cover dressing  -Assessment each clinic visit    3. Leg edema, right    5/14: Nonpitting edema of right lower extremity.  Patient states that she wears support stockings  -Patient to continue wearing support stockings.    -Consider 2 layer compression if no progress with wound.    4. Squamous cell carcinoma    5/14: Diagnosis reported by patient.  -Request for records sent to patient's dermatologist      Please note that this dictation was created using voice recognition software. I have worked with technical experts from [employer] to optimize the interface.  I have made every reasonable attempt to correct obvious errors, but there may be errors of grammar and possibly content that I did not discover before finalizing the note.

## 2019-05-23 ENCOUNTER — OFFICE VISIT (OUTPATIENT)
Dept: WOUND CARE | Facility: MEDICAL CENTER | Age: 79
End: 2019-05-23
Attending: DERMATOLOGY
Payer: MEDICARE

## 2019-05-23 ENCOUNTER — OFFICE VISIT (OUTPATIENT)
Dept: MEDICAL GROUP | Facility: MEDICAL CENTER | Age: 79
End: 2019-05-23
Payer: MEDICARE

## 2019-05-23 VITALS
RESPIRATION RATE: 16 BRPM | HEART RATE: 88 BPM | DIASTOLIC BLOOD PRESSURE: 83 MMHG | OXYGEN SATURATION: 98 % | SYSTOLIC BLOOD PRESSURE: 126 MMHG | TEMPERATURE: 98.3 F

## 2019-05-23 VITALS
DIASTOLIC BLOOD PRESSURE: 74 MMHG | BODY MASS INDEX: 18.52 KG/M2 | HEART RATE: 78 BPM | TEMPERATURE: 98.4 F | OXYGEN SATURATION: 98 % | HEIGHT: 67 IN | WEIGHT: 118 LBS | SYSTOLIC BLOOD PRESSURE: 108 MMHG

## 2019-05-23 DIAGNOSIS — Z00.00 MEDICARE ANNUAL WELLNESS VISIT, INITIAL: ICD-10-CM

## 2019-05-23 DIAGNOSIS — N95.1 MENOPAUSAL SYMPTOMS: ICD-10-CM

## 2019-05-23 DIAGNOSIS — F33.41 RECURRENT MAJOR DEPRESSIVE DISORDER, IN PARTIAL REMISSION (HCC): ICD-10-CM

## 2019-05-23 DIAGNOSIS — Z12.31 ENCOUNTER FOR SCREENING MAMMOGRAM FOR BREAST CANCER: ICD-10-CM

## 2019-05-23 DIAGNOSIS — G44.229 CHRONIC TENSION-TYPE HEADACHE, NOT INTRACTABLE: ICD-10-CM

## 2019-05-23 DIAGNOSIS — R60.0 LEG EDEMA, RIGHT: ICD-10-CM

## 2019-05-23 DIAGNOSIS — S81.801D OPEN WOUND OF RIGHT LOWER LEG, SUBSEQUENT ENCOUNTER: ICD-10-CM

## 2019-05-23 DIAGNOSIS — E03.9 HYPOTHYROIDISM, UNSPECIFIED TYPE: ICD-10-CM

## 2019-05-23 DIAGNOSIS — L30.9 DERMATITIS: ICD-10-CM

## 2019-05-23 DIAGNOSIS — F41.9 ANXIETY: ICD-10-CM

## 2019-05-23 DIAGNOSIS — Z78.0 ASYMPTOMATIC MENOPAUSAL STATE: ICD-10-CM

## 2019-05-23 DIAGNOSIS — F33.41 RECURRENT MAJOR DEPRESSION IN PARTIAL REMISSION (HCC): ICD-10-CM

## 2019-05-23 PROBLEM — Z85.828 H/O NONMELANOMA SKIN CANCER: Status: ACTIVE | Noted: 2019-05-06

## 2019-05-23 PROCEDURE — 11042 DBRDMT SUBQ TIS 1ST 20SQCM/<: CPT | Performed by: NURSE PRACTITIONER

## 2019-05-23 PROCEDURE — 11042 DBRDMT SUBQ TIS 1ST 20SQCM/<: CPT

## 2019-05-23 PROCEDURE — G0439 PPPS, SUBSEQ VISIT: HCPCS | Performed by: FAMILY MEDICINE

## 2019-05-23 RX ORDER — LORAZEPAM 1 MG/1
1 TABLET ORAL
Qty: 30 TAB | Refills: 4 | Status: SHIPPED
Start: 2019-05-23 | End: 2019-10-30 | Stop reason: SDUPTHER

## 2019-05-23 RX ORDER — BUTALBITAL, ACETAMINOPHEN AND CAFFEINE 50; 325; 40 MG/1; MG/1; MG/1
1 TABLET ORAL 2 TIMES DAILY PRN
Qty: 60 TAB | Refills: 4 | Status: SHIPPED
Start: 2019-05-23 | End: 2019-06-22

## 2019-05-23 RX ORDER — FLUOXETINE HYDROCHLORIDE 20 MG/1
20 CAPSULE ORAL
Qty: 90 CAP | Refills: 3 | Status: SHIPPED | OUTPATIENT
Start: 2019-05-23 | End: 2019-07-19 | Stop reason: SDUPTHER

## 2019-05-23 RX ORDER — LEVOTHYROXINE SODIUM 0.15 MG/1
TABLET ORAL
Qty: 90 TAB | Refills: 3 | Status: SHIPPED | OUTPATIENT
Start: 2019-05-23 | End: 2020-07-04

## 2019-05-23 ASSESSMENT — ENCOUNTER SYMPTOMS
GENERAL WELL-BEING: EXCELLENT
DEPRESSION: 0
VOMITING: 0
FEVER: 0
NAUSEA: 0
COUGH: 0
SHORTNESS OF BREATH: 0
CLAUDICATION: 0
CONSTIPATION: 0
CHILLS: 0
DIARRHEA: 0

## 2019-05-23 ASSESSMENT — PATIENT HEALTH QUESTIONNAIRE - PHQ9: CLINICAL INTERPRETATION OF PHQ2 SCORE: 0

## 2019-05-23 ASSESSMENT — ACTIVITIES OF DAILY LIVING (ADL): BATHING_REQUIRES_ASSISTANCE: 0

## 2019-05-23 NOTE — PROGRESS NOTES
Chief Complaint   Patient presents with   • Medication Refill         HPI:  Afsaneh Lyn is a 79 y.o. here for Medicare Annual Wellness Visit     Patient Active Problem List    Diagnosis Date Noted   • Open wound of right lower leg 05/06/2019   • H/O nonmelanoma skin cancer 05/06/2019   • Dermatitis 05/06/2019   • Leg edema, right 05/06/2019   • Recurrent major depression in partial remission (HCC) 12/04/2017   • Left wrist pain 12/04/2017   • Pure hypercholesterolemia 11/14/2016   • Vitamin D insufficiency 11/14/2016   • Anxiety 03/21/2016   • Menopausal symptoms 05/20/2014   • Insomnia 05/20/2014   • Hx of hysterectomy for benign disease 05/20/2014   • GERD (gastroesophageal reflux disease) 05/20/2014   • Dry eye syndrome 05/20/2014   • Hypothyroid 11/03/2009   • Tension headache, chronic 11/03/2009   • Diverticulosis        Current Outpatient Prescriptions   Medication Sig Dispense Refill   • estradiol (CLIMARA) 0.025 MG/24HR PATCH WEEKLY APPLY 1 PATCH TO A CLEAN DRY AREA OF THE SKIN ONCE A WEEK 12 Patch 3   • FLUoxetine (PROZAC) 20 MG Cap Take 1 Cap by mouth every day. 90 Cap 3   • levothyroxine (SYNTHROID) 150 MCG Tab TAKE 1 TABLET BY MOUTH EVERY MORNING BEFORE BREAKFAST ON AN EMPTY STOMACH 90 Tab 3   • acetaminophen/caffeine/butalbital 325-40-50 mg (FIORICET) -40 MG Tab Take 1 Tab by mouth 2 times a day as needed for Headache for up to 30 days. 60 Tab 4   • LORazepam (ATIVAN) 1 MG Tab Take 1 Tab by mouth 1 time daily as needed for Anxiety for up to 30 days. 30 Tab 4   • triamcinolone acetonide (KENALOG) 0.1 % Ointment Apply 1 Application to affected area(s) 1 time daily as needed. 1 Tube 0   • Aspirin-Acetaminophen-Caffeine (EXCEDRIN PO) Take  by mouth. Last dose 3 pm        No current facility-administered medications for this visit.             Current supplements as per medication list.       Allergies: Macrobid [nitrofurantoin monohydrate macrocrystals]    Current social contact/activities:  lives with .     She  reports that she has never smoked. She has never used smokeless tobacco. She reports that she drinks about 0.6 oz of alcohol per week . She reports that she does not use drugs.  Counseling given: Not Answered      DPA/Advanced Directive:  Patient does not have an Advanced Directive.  A packet and workshop information was given on Advanced Directives.    ROS:    Gait: Uses no assistive device  Ostomy: No  Other tubes: No  Amputations: No  Chronic oxygen use: No  Last eye exam: 2 years ago.  Wears hearing aids: No   : Denies any urinary leakage during the last 6 months      Depression Screening    Little interest or pleasure in doing things?  0 - not at all  Feeling down, depressed , or hopeless? 0 - not at all  Patient Health Questionnaire Score: 0     If depressive symptoms identified deferred to follow up visit unless specifically addressed in assessment and plan.    Interpretation of PHQ-9 Total Score   Score Severity   1-4 No Depression   5-9 Mild Depression   10-14 Moderate Depression   15-19 Moderately Severe Depression   20-27 Severe Depression    Screening for Cognitive Impairment    Three Minute Recall (village, kitchen, baby) 3/3    Momo clock face with all 12 numbers and set the hands to show 10 past 10.  Yes    Cognitive concerns identified deferred for follow up unless specifically addressed in assessment and plan.    Fall Risk Assessment    Has the patient had two or more falls in the last year or any fall with injury in the last year?  No    Safety Assessment    Throw rugs on floor.  Yes  Handrails on all stairs.  Yes  Good lighting in all hallways.  Yes  Difficulty hearing.  No  Patient counseled about all safety risks that were identified.    Functional Assessment ADLs    Are there any barriers preventing you from cooking for yourself or meeting nutritional needs?  No.    Are there any barriers preventing you from driving safely or obtaining transportation?  No.    Are  "there any barriers preventing you from using a telephone or calling for help?  No.    Are there any barriers preventing you from shopping?  No.    Are there any barriers preventing you from taking care of your own finances?  No.    Are there any barriers preventing you from managing your medications?  No.    Are there any barriers preventing you from showering, bathing or dressing yourself?  No.    Are you currently engaging in any exercise or physical activity?  Yes.     What is your perception of your health?  Excellent.      Health Maintenance Summary                Annual Wellness Visit Overdue 1940     IMM DTaP/Tdap/Td Vaccine Overdue 4/24/1961     BONE DENSITY Overdue 4/24/2007     IMM ZOSTER VACCINES Overdue 9/25/2009      Done 7/31/2009 Imm Admin: Zoster Vaccine Live (ZVL) (Zostavax)    MAMMOGRAM Overdue 3/14/2015      Done 3/14/2014 MA-BILAT DIAGNOSTIC MAMMO W/CAD     Patient has more history with this topic...          Patient Care Team:  Flavio Cooper M.D. as PCP - General (Family Medicine)  Ervin Cotton as Consulting Physician (Homeopath)        Social History   Substance Use Topics   • Smoking status: Never Smoker   • Smokeless tobacco: Never Used   • Alcohol use 0.6 oz/week     1 Glasses of wine per week      Comment: wine daily     History reviewed. No pertinent family history.  She  has a past medical history of Diverticulosis; Hypothyroid; IBS (irritable bowel syndrome); Migraines; and Mitral valve prolapse. She also has no past medical history of Breast cancer (HCC).   Past Surgical History:   Procedure Laterality Date   • ABDOMINAL HYSTERECTOMY TOTAL      Hysterectomy, Total Abdominal   • PB ENLARGE BREAST WITH IMPLANT     • US-NEEDLE CORE BX-BREAST PANEL         Exam:   /74 (BP Location: Right arm, Patient Position: Sitting)   Pulse 78   Temp 36.9 °C (98.4 °F)   Ht 1.702 m (5' 7\")   Wt 53.5 kg (118 lb)   SpO2 98%  Body mass index is 18.48 kg/m².    Constitutional: Alert, no " distress.  Skin: Warm, dry, good turgor, no rashes in visible areas.  Eye: Equal, round and reactive, conjunctiva clear, lids normal.  Respiratory: Unlabored respiratory effort, lungs clear to auscultation, no wheezes, no ronchi.  Cardiovascular: Normal S1, S2, no murmur, no edema.  Psych: Alert and oriented x3, normal affect and mood.      Assessment and Plan. The following treatment and monitoring plan is recommended:    1. Medicare annual wellness visit, initial  - Initial Annual Wellness Visit - Includes PPPS ()    2. Recurrent major depression in partial remission (HCC)  Controlled.  Continue fluoxetine 20 mg daily.  - Initial Annual Wellness Visit - Includes PPPS ()    3. Open wound of right lower leg, subsequent encounter  Slowly improving.  Continue following with wound care.  - Initial Annual Wellness Visit - Includes PPPS ()    4. Hypothyroidism, unspecified type  Continue levothyroxine 150 mcg daily.  Check TSH and call with results.  - levothyroxine (SYNTHROID) 150 MCG Tab; TAKE 1 TABLET BY MOUTH EVERY MORNING BEFORE BREAKFAST ON AN EMPTY STOMACH  Dispense: 90 Tab; Refill: 3  - TSH WITH REFLEX TO FT4; Future  - Initial Annual Wellness Visit - Includes PPPS ()    5. Menopausal symptoms  Patient is irregular about using her estradiol.  Encouraged her to get mammograms regularly.  - estradiol (CLIMARA) 0.025 MG/24HR PATCH WEEKLY; APPLY 1 PATCH TO A CLEAN DRY AREA OF THE SKIN ONCE A WEEK  Dispense: 12 Patch; Refill: 3  - Initial Annual Wellness Visit - Includes PPPS ()    6. Recurrent major depressive disorder, in partial remission (HCC)  Controlled.  Continue fluoxetine.  - FLUoxetine (PROZAC) 20 MG Cap; Take 1 Cap by mouth every day.  Dispense: 90 Cap; Refill: 3  - Initial Annual Wellness Visit - Includes PPPS ()    7. Chronic tension-type headache, not intractable  Controlled with Fioricet about 3 times per week.  Continue Fioricet.  - acetaminophen/caffeine/butalbital  325-40-50 mg (FIORICET) -40 MG Tab; Take 1 Tab by mouth 2 times a day as needed for Headache for up to 30 days.  Dispense: 60 Tab; Refill: 4  - Initial Annual Wellness Visit - Includes PPPS ()    8. Anxiety  Controlled with lorazepam about 2-3 times per week to help her sleep.  Continue Lorazepam.  - LORazepam (ATIVAN) 1 MG Tab; Take 1 Tab by mouth 1 time daily as needed for Anxiety for up to 30 days.  Dispense: 30 Tab; Refill: 4  - Initial Annual Wellness Visit - Includes PPPS ()    9. Encounter for screening mammogram for breast cancer  - MA-SCREEN MAMMO W/CAD-BILAT; Future  - Initial Annual Wellness Visit - Includes PPPS ()    10. Asymptomatic menopausal state  - DS-BONE DENSITY STUDY (DEXA); Future  - Initial Annual Wellness Visit - Includes PPPS ()      Services suggested: No services needed at this time  Health Care Screening: Age-appropriate preventive services recommended by USPTF and ACIP covered by Medicare were discussed today. Services ordered if indicated and agreed upon by the patient.  Referrals offered: Community-based lifestyle interventions to reduce health risks and promote self-management and wellness, fall prevention, nutrition, physical activity, tobacco-use cessation, weight loss, and mental health services as per orders if indicated.    Discussion today about general wellness and lifestyle habits:    · Prevent falls and reduce trip hazards; Cautioned about securing or removing rugs.  · Have a working fire alarm and carbon monoxide detector;   · Engage in regular physical activity and social activities     Follow-up: Return in about 1 year (around 5/23/2020) for Annual.

## 2019-05-23 NOTE — PATIENT INSTRUCTIONS
-Keep dressings clean, dry and covered while bathing. Only change dressings if they become over saturated, soiled or fall off.     -Avoid prolonged standing or sitting without elevating your legs.    -Should you experience any significant changes in your wound(s), such as infection (redness, swelling, localized heat, increased pain, fever > 101 F, chills) or have any questions regarding your home care instructions, please contact the wound center at [phone]. If after hours, contact your primary care physician or go to the hospital emergency room.

## 2019-05-23 NOTE — PROGRESS NOTES
"Provider Encounter- Full Thickness wound    HISTORY OF PRESENT ILLNESS   START OF CARE IN CLINIC:4/24/2019  REFERRING PROVIDER: KEITH Kim     WOUND- Full thickness wound  LOCATION: Right Anterior Shin     WOUND HISTORY:   Patient is a 79 year old female with a right anterior shin wound. 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 The patient reports that,, \"they did not clean margins, so they went back again\".  Presumably all of the cancer was removed, but pathology reports that sent over from dermatology.  Since the biopsy, the shin wound has not healed.   The patient reports that she had three wound cultures which all showed yeast in the wound, but no bacteria.  Nonetheless,  she was given a couple of shots of Rocephin, after which her wound improved. She was treated for the yeast with Nystatin powder by her dermatologist.      PERTINENT PMH: Squamous Cell Carcinoma      IMAGING:None  VASCULAR STUDIES: None               LAST  WOUND CULTURE: 4/24/19     RESULTS:  Heavy growth staph intermedius                                         DIABETES: NO  MOST RECENT A1C: N/A     TOBACCO USE: NEVER SMOKER        5/6: Initial provider assessment.  Patient states she was seen by Dr. Johnson with severe infectious diseases last week, Dr. Johnson did not want to prescribe antibiotics.  An MRI was also ordered by Dr. Johnson to rule out osteomyelitis, and arterial studies to assess lower extremity perfusion.  Patient is scheduled to return to ID in the next week or 2.  Periwound irritation noted, patient states she used a regular Band-Aid over the wound, and this irritated her skin.  Wound debrided in clinic.    5/14: Total wound area has decreased by more than 50%.  Patient states that TAC 1% ointment applied to periwound last clinic visit was very helpful.  Rx requested by patient, sent to patient's pharmacy.  She has not yet completed MRI or arterial studies " as ordered by Dr. Johnson.    5/23: Wound area slightly larger this week.  Debrided in clinic.  She is due to follow-up with her dermatologist in 2 weeks.      PAST MEDICAL HISTORY:   Past Medical History:   Diagnosis Date   • Diverticulosis    • Hypothyroid    • IBS (irritable bowel syndrome)    • Migraines    • Mitral valve prolapse        PAST SURGICAL HISTORY:   Past Surgical History:   Procedure Laterality Date   • ABDOMINAL HYSTERECTOMY TOTAL      Hysterectomy, Total Abdominal   • PB ENLARGE BREAST WITH IMPLANT     • US-NEEDLE CORE BX-BREAST PANEL          MEDICATIONS:   Current Outpatient Prescriptions   Medication   • triamcinolone acetonide (KENALOG) 0.1 % Ointment   • FLUoxetine (PROZAC) 20 MG Cap   • levothyroxine (SYNTHROID) 150 MCG Tab   • estradiol (CLIMARA) 0.025 MG/24HR PATCH WEEKLY   • Aspirin-Acetaminophen-Caffeine (EXCEDRIN PO)     No current facility-administered medications for this visit.        ALLERGIES:    Allergies   Allergen Reactions   • Macrobid [Nitrofurantoin Monohydrate Macrocrystals] Nausea     Cramp in legs         SOCIAL HISTORY:   Social History     Social History   • Marital status: Single     Spouse name: N/A   • Number of children: N/A   • Years of education: N/A     Social History Main Topics   • Smoking status: Never Smoker   • Smokeless tobacco: Never Used   • Alcohol use 0.6 oz/week     1 Glasses of wine per week      Comment: wine daily   • Drug use: No   • Sexual activity: Not on file      Comment: , two daughters,      Other Topics Concern   • Not on file     Social History Narrative   • No narrative on file       FAMILY HISTORY: History reviewed. No pertinent family history.     REVIEW OF SYSTEMS:   Review of Systems   Constitutional: Negative for chills and fever.   Respiratory: Negative for cough and shortness of breath.    Cardiovascular: Positive for leg swelling. Negative for chest pain and claudication.   Gastrointestinal: Negative for  constipation, diarrhea, nausea and vomiting.   Genitourinary: Negative for dysuria.   Musculoskeletal: Negative for joint pain.   Psychiatric/Behavioral: Negative for depression.       PHYSICAL EXAMINATION:   /83   Pulse 88   Temp 36.8 °C (98.3 °F) (Temporal)   Resp 16   SpO2 98%   Physical Exam   Constitutional: She is oriented to person, place, and time and well-developed, well-nourished, and in no distress.   Thin, undernourished   HENT:   Head: Normocephalic.   Eyes: Pupils are equal, round, and reactive to light.   Cardiovascular: Intact distal pulses.    Strong, palpable pedal pulses   Pulmonary/Chest: Effort normal.   Musculoskeletal: Normal range of motion.   Nonpitting edema of right lower extremity   Neurological: She is alert and oriented to person, place, and time.   Skin: Skin is warm.   Open wound to anterior right shin  Refer to flowsheet and photos   Psychiatric: Mood, memory, affect and judgment normal.       Wound Assessment   Wound 04/24/19 Full Thickness Wound Leg -Right Anterior Shin (Active)   Wound Image    5/23/2019 10:16 AM   Site Assessment Yellow 5/23/2019 10:16 AM   Carolyn-wound Assessment Blanchable erythema;Fragile 5/23/2019 10:16 AM   Margins Attached edges 5/23/2019 10:16 AM   Wound Length (cm) 0.6 cm 5/6/2019  4:00 PM   Wound Width (cm) 0.4 cm 5/6/2019  4:00 PM   Wound Depth (cm) 0.3 cm 5/6/2019  4:00 PM   Wound Surface Area (cm^2) 0.24 cm^2 5/6/2019  4:00 PM   Post Wound Length (cm) 0.5 cm 5/23/2019 10:16 AM    Post Wound Width (cm) 0.3 cm 5/23/2019 10:16 AM   Post Wound Depth (cm) 0.3 cm 5/23/2019 10:16 AM   Post Wound Surface Area (cm^2) 0.15 cm^2 5/23/2019 10:16 AM   Tunneling 0 cm 5/14/2019  1:38 PM   Undermining 0 cm 5/14/2019  1:38 PM   Drainage Amount Scant 5/23/2019 10:16 AM   Drainage Description Serosanguineous 5/23/2019 10:16 AM   Non-staged Wound Description Full thickness 5/23/2019 10:16 AM   Treatments Cleansed;Pharmaceutical agent;Other (Comment) 5/23/2019  10:16 AM   Cleansing Normal Saline Irrigation 5/23/2019 10:16 AM   Periwound Protectant Skin Protectant wipes to Periwound 5/23/2019 10:16 AM   Dressing Options Honey Alginate;Other (Comments);Adhesive Foam 5/23/2019 10:16 AM   Dressing Changed Changed 5/23/2019 10:16 AM   Dressing Status Clean;Dry;Intact 5/23/2019 10:16 AM   Dressing Change Frequency Every 72 hrs 5/23/2019 10:16 AM   WOUND NURSE ONLY - Odor None 5/23/2019 10:16 AM   WOUND NURSE ONLY - Pulses 1+;DP 5/14/2019  1:38 PM   WOUND NURSE ONLY - Exposed Structures None 5/23/2019 10:16 AM   WOUND NURSE ONLY - Tissue Type and Percentage 100% crusted dressing pre debridement 5/23/2019 10:16 AM                Pre-debridement Photo            PROCEDURE  -2% viscous lidocaine applied topically to wound bed for approximately 5 minutes prior to debridement  -  4m curette used to debride wound bed..  Excisional debridement was performed to remove devitalized tissue until healthy, bleeding tissue was visualized.   Entire surface of wound, 0.15 cm2, debrided  Tissue debrided into the subcutaneous layer.   -Bleeding controlled with manual pressure   -Wound care completed by  Bree Donohue RN    Post-debridement Photo                PATIENT EDUCATION  -Advised to go to ER for any increased redness, swelling, drainage or odor, or if patient develops fever, chills, nausea or vomiting.  -Importance of adequate nutrition for wound healing  -Increase protein intake (unless contraindicated by renal status)    ASSESSMENT AND PLAN:   1. Open wound of right lower leg, subsequent encounter  Comments: Open wound due to Mohs procedure, nonhealing.    5/ 23: Wound area is increased slightly since last assessment.  -Excisional debridement in clinic today, medically necessary to promote wound healing  -Patient to return to clinic weekly for assessment debridement  -She is to change the dressing herself 1-2 times in between clinic visits.  -Follow-up with infectious diseases as  scheduled   Wound care: Honey alginate to promote autolytic debridement and to manage bioburden, silicone foam cover dressing      2. Dermatitis    5/23: Irritation to periwound, presumably caused by allergic reaction to adhesive from Band-Aid placed by patient  -TAC 1% to affected areas prior to placement of new dressing  -Silicone foam cover dressing  -Assessment each clinic visit    3. Leg edema, right    5/23: Nonpitting edema of right lower extremity.  Patient states that she wears support stockings  -Patient to continue wearing support stockings.    -Consider 2 layer compression if no progress with wound.    4. Squamous cell carcinoma    5/23: Diagnosis reported by patient.  -Dermatology notes in epic under media tab  -Patient to the follow-up with dermatologist in 2 weeks      Please note that this dictation was created using voice recognition software. I have worked with technical experts from [employer] to optimize the interface.  I have made every reasonable attempt to correct obvious errors, but there may be errors of grammar and possibly content that I did not discover before finalizing the note.

## 2019-05-28 ENCOUNTER — OFFICE VISIT (OUTPATIENT)
Dept: WOUND CARE | Facility: MEDICAL CENTER | Age: 79
End: 2019-05-28
Attending: DERMATOLOGY
Payer: MEDICARE

## 2019-05-28 VITALS
OXYGEN SATURATION: 97 % | TEMPERATURE: 99.1 F | RESPIRATION RATE: 18 BRPM | DIASTOLIC BLOOD PRESSURE: 89 MMHG | HEART RATE: 96 BPM | SYSTOLIC BLOOD PRESSURE: 152 MMHG

## 2019-05-28 DIAGNOSIS — R60.0 LEG EDEMA, RIGHT: ICD-10-CM

## 2019-05-28 DIAGNOSIS — L08.9 WOUND INFECTION: ICD-10-CM

## 2019-05-28 DIAGNOSIS — L30.9 DERMATITIS: ICD-10-CM

## 2019-05-28 DIAGNOSIS — S81.801D OPEN WOUND OF RIGHT LOWER LEG, SUBSEQUENT ENCOUNTER: Primary | ICD-10-CM

## 2019-05-28 DIAGNOSIS — T14.8XXA WOUND INFECTION: ICD-10-CM

## 2019-05-28 PROCEDURE — 99212 OFFICE O/P EST SF 10 MIN: CPT

## 2019-05-28 PROCEDURE — 99213 OFFICE O/P EST LOW 20 MIN: CPT | Performed by: NURSE PRACTITIONER

## 2019-05-28 RX ORDER — MUPIROCIN CALCIUM 20 MG/G
1 CREAM TOPICAL 2 TIMES DAILY
Qty: 1 TUBE | Refills: 0 | Status: SHIPPED | OUTPATIENT
Start: 2019-05-28 | End: 2020-08-14

## 2019-05-28 ASSESSMENT — ENCOUNTER SYMPTOMS
DIARRHEA: 0
CLAUDICATION: 0
DEPRESSION: 0
COUGH: 0
SHORTNESS OF BREATH: 0
CONSTIPATION: 0
CHILLS: 0
FEVER: 0
VOMITING: 0
NAUSEA: 0

## 2019-05-28 NOTE — PATIENT INSTRUCTIONS
-Should you experience any significant changes in your wound(s), such as infection (redness, swelling, localized heat, increased pain, fever > 101 F, chills) or have any questions regarding your home care instructions, please contact the wound center at [phone]. If after hours, contact your primary care physician or go to the hospital emergency room.

## 2019-05-28 NOTE — PROGRESS NOTES
"Provider Encounter- Full Thickness wound    HISTORY OF PRESENT ILLNESS   START OF CARE IN CLINIC:4/24/2019  REFERRING PROVIDER: KEITH Kim     WOUND- Full thickness wound  LOCATION: Right Anterior Shin     WOUND HISTORY:   Patient is a 79 year old female with a right anterior shin wound. 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 The patient reports that,, \"they did not clean margins, so they went back again\".  Presumably all of the cancer was removed, but pathology reports that sent over from dermatology.  Since the biopsy, the shin wound has not healed.   The patient reports that she had three wound cultures which all showed yeast in the wound, but no bacteria.  Nonetheless,  she was given a couple of shots of Rocephin, after which her wound improved. She was treated for the yeast with Nystatin powder by her dermatologist.      PERTINENT PMH: Squamous Cell Carcinoma      IMAGING:None  VASCULAR STUDIES: None               LAST  WOUND CULTURE: 4/24/19     RESULTS:  Heavy growth staph intermedius                                         DIABETES: NO  MOST RECENT A1C: N/A     TOBACCO USE: NEVER SMOKER        5/6: Initial provider assessment.  Patient states she was seen by Dr. Johnson with severe infectious diseases last week, Dr. Johnson did not want to prescribe antibiotics.  An MRI was also ordered by Dr. Johnson to rule out osteomyelitis, and arterial studies to assess lower extremity perfusion.  Patient is scheduled to return to ID in the next week or 2.  Periwound irritation noted, patient states she used a regular Band-Aid over the wound, and this irritated her skin.  Wound debrided in clinic.    5/14: Total wound area has decreased by more than 50%.  Patient states that TAC 1% ointment applied to periwound last clinic visit was very helpful.  Rx requested by patient, sent to patient's pharmacy.  She has not yet completed MRI or arterial studies " as ordered by Dr. Johnson.    5/23: Wound area slightly larger this week.  Debrided in clinic.  She is due to follow-up with her dermatologist in 2 weeks.    5/28: Wound nearly resolved, however periwound skin is again broken down.  Red rash, multiple scabs/denudation.  No debridement of wound today.  Rx for mupirocin sent to patient's pharmacy.      PAST MEDICAL HISTORY:   Past Medical History:   Diagnosis Date   • Diverticulosis    • Hypothyroid    • IBS (irritable bowel syndrome)    • Migraines    • Mitral valve prolapse        PAST SURGICAL HISTORY:   Past Surgical History:   Procedure Laterality Date   • ABDOMINAL HYSTERECTOMY TOTAL      Hysterectomy, Total Abdominal   • PB ENLARGE BREAST WITH IMPLANT     • US-NEEDLE CORE BX-BREAST PANEL          MEDICATIONS:   Current Outpatient Prescriptions   Medication   • estradiol (CLIMARA) 0.025 MG/24HR PATCH WEEKLY   • FLUoxetine (PROZAC) 20 MG Cap   • levothyroxine (SYNTHROID) 150 MCG Tab   • acetaminophen/caffeine/butalbital 325-40-50 mg (FIORICET) -40 MG Tab   • LORazepam (ATIVAN) 1 MG Tab   • triamcinolone acetonide (KENALOG) 0.1 % Ointment   • Aspirin-Acetaminophen-Caffeine (EXCEDRIN PO)     No current facility-administered medications for this visit.        ALLERGIES:    Allergies   Allergen Reactions   • Macrobid [Nitrofurantoin Monohydrate Macrocrystals] Nausea     Cramp in legs         SOCIAL HISTORY:   Social History     Social History   • Marital status: Single     Spouse name: N/A   • Number of children: N/A   • Years of education: N/A     Social History Main Topics   • Smoking status: Never Smoker   • Smokeless tobacco: Never Used   • Alcohol use 0.6 oz/week     1 Glasses of wine per week      Comment: wine daily   • Drug use: No   • Sexual activity: Not on file      Comment: , two daughters,      Other Topics Concern   • Not on file     Social History Narrative   • No narrative on file       FAMILY HISTORY: History reviewed. No  pertinent family history.     REVIEW OF SYSTEMS:   Review of Systems   Constitutional: Negative for chills and fever.   Respiratory: Negative for cough and shortness of breath.    Cardiovascular: Positive for leg swelling. Negative for chest pain and claudication.   Gastrointestinal: Negative for constipation, diarrhea, nausea and vomiting.   Genitourinary: Negative for dysuria.   Musculoskeletal: Negative for joint pain.   Psychiatric/Behavioral: Negative for depression.       PHYSICAL EXAMINATION:   /89   Pulse 96   Temp 37.3 °C (99.1 °F) (Temporal)   Resp 18   SpO2 97%   Physical Exam   Constitutional: She is oriented to person, place, and time and well-developed, well-nourished, and in no distress.   Thin, undernourished   HENT:   Head: Normocephalic.   Eyes: Pupils are equal, round, and reactive to light.   Cardiovascular: Intact distal pulses.    Strong, palpable pedal pulses   Pulmonary/Chest: Effort normal.   Musculoskeletal: Normal range of motion.   Nonpitting edema of right lower extremity   Neurological: She is alert and oriented to person, place, and time.   Skin: Skin is warm.   Open wound to anterior right shin  Refer to flowsheet and photos   Psychiatric: Mood, memory, affect and judgment normal.       Wound Assessment    Wound 04/24/19 Full Thickness Wound Leg -Right Anterior Shin (Active)   Wound Image   5/28/2019  2:10 PM   Site Assessment Dry;Brown 5/28/2019  2:10 PM   Carolyn-wound Assessment Blanchable erythema 5/28/2019  2:10 PM   Margins Attached edges 5/28/2019  2:10 PM   Wound Length (cm) 0.6 cm 5/6/2019  4:00 PM   Wound Width (cm) 0.4 cm 5/6/2019  4:00 PM   Wound Depth (cm) 0.3 cm 5/6/2019  4:00 PM   Wound Surface Area (cm^2) 0.24 cm^2 5/6/2019  4:00 PM   Post Wound Length (cm) 0.5 cm 5/23/2019 10:16 AM    Post Wound Width (cm) 0.3 cm 5/23/2019 10:16 AM   Post Wound Depth (cm) 0.3 cm 5/23/2019 10:16 AM   Post Wound Surface Area (cm^2) 0.15 cm^2 5/23/2019 10:16 AM   Tunneling 0 cm  5/14/2019  1:38 PM   Undermining 0 cm 5/14/2019  1:38 PM   Drainage Amount CJ 5/28/2019  2:10 PM   Drainage Description Serosanguineous 5/23/2019 10:16 AM   Non-staged Wound Description Full thickness 5/23/2019 10:16 AM   Treatments Cleansed 5/28/2019  2:10 PM   Cleansing Normal Saline Irrigation 5/28/2019  2:10 PM   Periwound Protectant Skin Protectant wipes to Periwound 5/28/2019  2:10 PM   Dressing Options Other (Comments);Dry Gauze;Hypafix Tape 5/28/2019  2:10 PM   Dressing Changed Changed 5/28/2019  2:10 PM   Dressing Status Clean;Dry;Intact 5/23/2019 10:16 AM   Dressing Change Frequency Every 72 hrs 5/23/2019 10:16 AM   WOUND NURSE ONLY - Odor None 5/28/2019  2:10 PM   WOUND NURSE ONLY - Pulses 1+;DP 5/14/2019  1:38 PM   WOUND NURSE ONLY - Exposed Structures None 5/28/2019  2:10 PM   WOUND NURSE ONLY - Tissue Type and Percentage 100% dry brown scabs. 5/28/2019  2:10 PM                   Wound photo no debridement today                PROCEDURE: Wound nearly resolved, however she presents today with periwound dermatitis  - no excisional debridement of wound today   -Wound care completed by Kathleen Hyatt RN                    PATIENT EDUCATION  -Advised to go to ER for any increased redness, swelling, drainage or odor, or if patient develops fever, chills, nausea or vomiting.  -Importance of adequate nutrition for wound healing  -Increase protein intake (unless contraindicated by renal status)    ASSESSMENT AND PLAN:   1. Open wound of right lower leg, subsequent encounter  Comments: Open wound due to Mohs procedure, nonhealing.    5/ 28: Wound nearly resolved  -Excisional debridement not indicated today  -Patient to return to clinic weekly for assessment debridement  -She is to change the dressing herself 1-2 times in between clinic visits.  -Follow-up with infectious diseases as scheduled   Wound care: Dry gauze only secured with Hypafix to edges.    2. Dermatitis    5/28: Irritation to periwound,  presumably caused by allergic reaction to adhesive from Band-Aid placed by patient  -Luxiq applied to periwound in clinic today.  -TAC 1% to affected areas prior to placement of new dressing  -Dry gauze cover dressing  -Assessment each clinic visit    3. Leg edema, right    5/28: Nonpitting edema of right lower extremity.  Patient states that she wears support stockings  -Patient to continue wearing support stockings.    -Consider 2 layer compression if wound fails to progress to closure    4. Squamous cell carcinoma    5/28: Diagnosis reported by patient.  -Dermatology notes in epic under media tab  -Patient to the follow-up with dermatologist in 2 weeks    5.  Wound infection    5/28: Patient skin appears to be reacting to cover dressing.  Erythema and scabbing/denudation restricted to area under previous dressing.  Possible underlying staph infection.    -Rx for mupirocin sent to patient's pharmacy to cover possible bacterial etiology.    Please note that this dictation was created using voice recognition software. I have worked with technical experts from [facility] [employer] to optimize the interface.  I have made every reasonable attempt to correct obvious errors, but there may be errors of grammar and possibly content that I did not discover before finalizing the note.

## 2019-06-04 ENCOUNTER — OFFICE VISIT (OUTPATIENT)
Dept: WOUND CARE | Facility: MEDICAL CENTER | Age: 79
End: 2019-06-04
Attending: DERMATOLOGY
Payer: MEDICARE

## 2019-06-04 VITALS
HEART RATE: 69 BPM | TEMPERATURE: 99.4 F | SYSTOLIC BLOOD PRESSURE: 122 MMHG | OXYGEN SATURATION: 97 % | DIASTOLIC BLOOD PRESSURE: 72 MMHG | RESPIRATION RATE: 16 BRPM

## 2019-06-04 DIAGNOSIS — L30.9 DERMATITIS: ICD-10-CM

## 2019-06-04 DIAGNOSIS — S81.801D OPEN WOUND OF RIGHT LOWER LEG, SUBSEQUENT ENCOUNTER: ICD-10-CM

## 2019-06-04 DIAGNOSIS — R60.0 LEG EDEMA, RIGHT: ICD-10-CM

## 2019-06-04 PROCEDURE — 99212 OFFICE O/P EST SF 10 MIN: CPT | Performed by: NURSE PRACTITIONER

## 2019-06-04 PROCEDURE — 99212 OFFICE O/P EST SF 10 MIN: CPT

## 2019-06-04 ASSESSMENT — ENCOUNTER SYMPTOMS
DEPRESSION: 0
NAUSEA: 0
SHORTNESS OF BREATH: 0
FEVER: 0
DIARRHEA: 0
CONSTIPATION: 0
COUGH: 0
VOMITING: 0
CHILLS: 0
CLAUDICATION: 0

## 2019-06-04 NOTE — PATIENT INSTRUCTIONS
-Should you experience any significant changes in your wound(s), such as infection (redness, swelling, localized heat, increased pain, fever > 101 F, chills) or have any questions regarding your home care instructions, please contact the wound center at [phone]. If after hours, contact your primary care physician or go to the hospital emergency room.

## 2019-06-04 NOTE — PROGRESS NOTES
"Provider Encounter- Full Thickness wound    HISTORY OF PRESENT ILLNESS   START OF CARE IN CLINIC:4/24/2019  REFERRING PROVIDER: KEITH Kim     WOUND- Full thickness wound  LOCATION: Right Anterior Shin     WOUND HISTORY:   Patient is a 79 year old female with a right anterior shin wound. 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 The patient reports that,, \"they did not clean margins, so they went back again\".  Presumably all of the cancer was removed, but pathology reports that sent over from dermatology.  Since the biopsy, the shin wound has not healed.   The patient reports that she had three wound cultures which all showed yeast in the wound, but no bacteria.  Nonetheless,  she was given a couple of shots of Rocephin, after which her wound improved. She was treated for the yeast with Nystatin powder by her dermatologist.      PERTINENT PMH: Squamous Cell Carcinoma      IMAGING:None  VASCULAR STUDIES: None               LAST  WOUND CULTURE: 4/24/19     RESULTS:  Heavy growth staph intermedius                                         DIABETES: NO  MOST RECENT A1C: N/A     TOBACCO USE: NEVER SMOKER        5/6: Initial provider assessment.  Patient states she was seen by Dr. Johnson with severe infectious diseases last week, Dr. Johnson did not want to prescribe antibiotics.  An MRI was also ordered by Dr. Johnson to rule out osteomyelitis, and arterial studies to assess lower extremity perfusion.  Patient is scheduled to return to ID in the next week or 2.  Periwound irritation noted, patient states she used a regular Band-Aid over the wound, and this irritated her skin.  Wound debrided in clinic.    5/14: Total wound area has decreased by more than 50%.  Patient states that TAC 1% ointment applied to periwound last clinic visit was very helpful.  Rx requested by patient, sent to patient's pharmacy.  She has not yet completed MRI or arterial studies " as ordered by Dr. Johnson.    5/23: Wound area slightly larger this week.  Debrided in clinic.  She is due to follow-up with her dermatologist in 2 weeks.    5/28: Wound nearly resolved, however periwound skin is again broken down.  Red rash, multiple scabs/denudation.  No debridement of wound today.  Rx for mupirocin sent to patient's pharmacy.    6/4: Patient presented today with a dry brown crust over her wound, which was selectively debrided.  Skin underneath is intact, wound resolved.  Dermatitis to periwound improved from last week, she has been applying mupirocin twice daily..  She is discharged from [facility].  Encouraged to follow-up with her dermatologist if dermatitis does not improve over the next week or 2.      PAST MEDICAL HISTORY:   Past Medical History:   Diagnosis Date   • Diverticulosis    • Hypothyroid    • IBS (irritable bowel syndrome)    • Migraines    • Mitral valve prolapse        PAST SURGICAL HISTORY:   Past Surgical History:   Procedure Laterality Date   • ABDOMINAL HYSTERECTOMY TOTAL      Hysterectomy, Total Abdominal   • PB ENLARGE BREAST WITH IMPLANT     • US-NEEDLE CORE BX-BREAST PANEL          MEDICATIONS:   Current Outpatient Prescriptions   Medication   • mupirocin calcium (BACTROBAN) 2 % Cream   • estradiol (CLIMARA) 0.025 MG/24HR PATCH WEEKLY   • FLUoxetine (PROZAC) 20 MG Cap   • levothyroxine (SYNTHROID) 150 MCG Tab   • acetaminophen/caffeine/butalbital 325-40-50 mg (FIORICET) -40 MG Tab   • LORazepam (ATIVAN) 1 MG Tab   • triamcinolone acetonide (KENALOG) 0.1 % Ointment   • Aspirin-Acetaminophen-Caffeine (EXCEDRIN PO)     No current facility-administered medications for this visit.        ALLERGIES:    Allergies   Allergen Reactions   • Macrobid [Nitrofurantoin Monohydrate Macrocrystals] Nausea     Cramp in legs         SOCIAL HISTORY:   Social History     Social History   • Marital status: Single     Spouse name: N/A   • Number of children: N/A   • Years of education: N/A      Social History Main Topics   • Smoking status: Never Smoker   • Smokeless tobacco: Never Used   • Alcohol use 0.6 oz/week     1 Glasses of wine per week      Comment: wine daily   • Drug use: No   • Sexual activity: Not on file      Comment: , two daughters,      Other Topics Concern   • Not on file     Social History Narrative   • No narrative on file       FAMILY HISTORY: History reviewed. No pertinent family history.     REVIEW OF SYSTEMS:   Review of Systems   Constitutional: Negative for chills and fever.   Respiratory: Negative for cough and shortness of breath.    Cardiovascular: Positive for leg swelling. Negative for chest pain and claudication.   Gastrointestinal: Negative for constipation, diarrhea, nausea and vomiting.   Genitourinary: Negative for dysuria.   Musculoskeletal: Negative for joint pain.   Psychiatric/Behavioral: Negative for depression.       PHYSICAL EXAMINATION:   /72   Pulse 69   Temp 37.4 °C (99.4 °F) (Temporal)   Resp 16   SpO2 97%   Physical Exam   Constitutional: She is oriented to person, place, and time and well-developed, well-nourished, and in no distress.   Thin, undernourished   HENT:   Head: Normocephalic.   Eyes: Pupils are equal, round, and reactive to light.   Cardiovascular: Intact distal pulses.    Strong, palpable pedal pulses   Pulmonary/Chest: Effort normal.   Musculoskeletal: Normal range of motion.   Nonpitting edema of right lower extremity   Neurological: She is alert and oriented to person, place, and time.   Skin: Skin is warm.   Open wound to anterior right shin  Refer to flowsheet and photos   Psychiatric: Mood, memory, affect and judgment normal.       Wound Assessment  -resolved                  Wound photo no debridement today          PROCEDURE: Selective debridement of crust  -Curette used to remove dry brown crust.  Underlying skin intact.  Wound resolved   -Wound care completed by Kathleen Hyatt RN      Right  anterior leg ulcer, post removal of crusts                  PATIENT EDUCATION  -Advised to go to ER for any increased redness, swelling, drainage or odor, or if patient develops fever, chills, nausea or vomiting.  -Importance of adequate nutrition for wound healing  -Increase protein intake (unless contraindicated by renal status)    ASSESSMENT AND PLAN:   1. Open wound of right lower leg, subsequent encounter  Comments: Open wound due to Mohs procedure, nonhealing.    6/4: Wound resolved  -Selective debridement of dry brown crust.  Underlying skin intact  -Discharge from [facility].  She understands she is to return to clinic ASAP if wound recurs.   Wound care: Open air    2. Dermatitis    6/4: Improved from last week  -Patient to continue to apply mupirocin twice daily of open air  -Follow-up with dermatologist if no improvement in 1 to 2 weeks    3. Leg edema, right    6/4: Nonpitting edema of right lower extremity.  Patient states that she wears support stockings  -Patient to resume wearing support stockings.        4. Squamous cell carcinoma    6/4:   -Dermatology notes in epic under media tab  -Patient to the follow-up with dermatologist in 2 weeks            Please note that this dictation was created using voice recognition software. I have worked with technical experts from [employer] to optimize the interface.  I have made every reasonable attempt to correct obvious errors, but there may be errors of grammar and possibly content that I did not discover before finalizing the note.

## 2019-06-06 ENCOUNTER — OFFICE VISIT (OUTPATIENT)
Dept: URGENT CARE | Facility: CLINIC | Age: 79
End: 2019-06-06
Payer: MEDICARE

## 2019-06-06 VITALS
BODY MASS INDEX: 18.83 KG/M2 | HEIGHT: 67 IN | SYSTOLIC BLOOD PRESSURE: 112 MMHG | DIASTOLIC BLOOD PRESSURE: 68 MMHG | OXYGEN SATURATION: 98 % | HEART RATE: 71 BPM | TEMPERATURE: 97.9 F | RESPIRATION RATE: 16 BRPM | WEIGHT: 120 LBS
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
## 2019-06-06 PROCEDURE — 99214 OFFICE O/P EST MOD 30 MIN: CPT | Mod: 25 | Performed by: NURSE PRACTITIONER

## 2019-06-06 PROCEDURE — 90471 IMMUNIZATION ADMIN: CPT | Performed by: NURSE PRACTITIONER

## 2019-06-06 PROCEDURE — 90714 TD VACC NO PRESV 7 YRS+ IM: CPT | Performed by: NURSE PRACTITIONER

## 2019-06-06 RX ORDER — AMOXICILLIN AND CLAVULANATE POTASSIUM 875; 125 MG/1; MG/1
1 TABLET, FILM COATED ORAL 2 TIMES DAILY
Qty: 14 TAB | Refills: 0 | Status: SHIPPED | OUTPATIENT
Start: 2019-06-06 | End: 2019-06-13

## 2019-06-07 NOTE — PROGRESS NOTES
Chief Complaint   Patient presents with   • Animal Bite     cat bite       HISTORY OF PRESENT ILLNESS: Patient is a 79 y.o. female who presents to urgent care today with complaints of a cat bite to her right forearm.  Patient reports that this morning she was feeding a stray cat when the cat bit her right forearm.  She immediately developed 2 small, superficial puncture wounds to her right forearm.  She has had pain since.  Pain is mild.  She denies associated fever, chills, malaise.  She cannot recall her last tetanus booster.    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) 12/04/2017   • Left wrist pain 12/04/2017   • Pure hypercholesterolemia 11/14/2016   • Vitamin D insufficiency 11/14/2016   • Anxiety 03/21/2016   • Menopausal symptoms 05/20/2014   • Insomnia 05/20/2014   • Hx of hysterectomy for benign disease 05/20/2014   • GERD (gastroesophageal reflux disease) 05/20/2014   • Dry eye syndrome 05/20/2014   • Hypothyroid 11/03/2009   • Tension headache, chronic 11/03/2009   • Diverticulosis        Allergies:Macrobid [nitrofurantoin monohydrate macrocrystals]    Current Outpatient Prescriptions Ordered in [facility]   Medication Sig Dispense Refill   • amoxicillin-clavulanate (AUGMENTIN) 875-125 MG Tab Take 1 Tab by mouth 2 times a day for 7 days. 14 Tab 0   • FLUoxetine (PROZAC) 20 MG Cap Take 1 Cap by mouth every day. 90 Cap 3   • levothyroxine (SYNTHROID) 150 MCG Tab TAKE 1 TABLET BY MOUTH EVERY MORNING BEFORE BREAKFAST ON AN EMPTY STOMACH 90 Tab 3   • mupirocin calcium (BACTROBAN) 2 % Cream Apply 1 Application to affected area(s) 2 times a day. 1 Tube 0   • estradiol (CLIMARA) 0.025 MG/24HR PATCH WEEKLY APPLY 1 PATCH TO A CLEAN DRY AREA OF THE SKIN ONCE A WEEK 12 Patch 3   •  "acetaminophen/caffeine/butalbital 325-40-50 mg (FIORICET) -40 MG Tab Take 1 Tab by mouth 2 times a day as needed for Headache for up to 30 days. 60 Tab 4   • LORazepam (ATIVAN) 1 MG Tab Take 1 Tab by mouth 1 time daily as needed for Anxiety for up to 30 days. 30 Tab 4   • triamcinolone acetonide (KENALOG) 0.1 % Ointment Apply 1 Application to affected area(s) 1 time daily as needed. 1 Tube 0   • Aspirin-Acetaminophen-Caffeine (EXCEDRIN PO) Take  by mouth. Last dose 3 pm        No current Epic-ordered facility-administered medications on file.        Past Medical History:   Diagnosis Date   • Diverticulosis    • Hypothyroid    • IBS (irritable bowel syndrome)    • Migraines    • Mitral valve prolapse        Social History   Substance Use Topics   • Smoking status: Never Smoker   • Smokeless tobacco: Never Used   • Alcohol use 0.6 oz/week     1 Glasses of wine per week      Comment: wine daily       Family Status   Relation Status   • Mo    • Fa    History reviewed. No pertinent family history.    ROS:  Review of Systems   Constitutional: Negative for fever, chills, weight loss, malaise, and fatigue.   HENT: Negative for ear pain, nosebleeds, congestion, sore throat and neck pain.    Eyes: Negative for vision changes.   Neuro: Negative for headache, sensory changes, weakness, seizure, LOC.   Cardiovascular: Negative for chest pain, palpitations, orthopnea and leg swelling.   Respiratory: Negative for cough, sputum production, shortness of breath and wheezing.   Gastrointestinal: Negative for abdominal pain, nausea, vomiting or diarrhea.   Musculoskeletal: Negative for falls, neck pain, back pain, joint pain, myalgias.   Skin: Positive for cat bite.  Negative for rash, diaphoresis.     Exam:  /68 (BP Location: Right arm, Patient Position: Sitting)   Pulse 71   Temp 36.6 °C (97.9 °F) (Temporal)   Resp 16   Ht 1.702 m (5' 7\")   Wt 54.4 kg (120 lb)   SpO2 98%   General: well-nourished, " well-developed female in NAD  Head: normocephalic, atraumatic  Eyes: PERRLA, no conjunctival injection, acuity grossly intact, lids normal.  Ears: normal shape and symmetry, no tenderness, no discharge. External canals are without any significant edema or erythema. Gross auditory acuity is intact.  Nose: symmetrical without tenderness, no discharge.  Mouth/Throat: reasonable hygiene, no erythema, exudates or tonsillar enlargement.  Neck: no masses, range of motion within normal limits, no tracheal deviation. No obvious thyroid enlargement.   Lymph: no cervical adenopathy. No supraclavicular adenopathy.   Neuro: alert and oriented. Cranial nerves 1-12 grossly intact. No sensory deficit.   Cardiovascular: regular rate and rhythm. No edema.  Pulmonary: no distress. Chest is symmetrical with respiration, no wheezes, crackles, or rhonchi.   Musculoskeletal: no clubbing, appropriate muscle tone, gait is stable.  Skin: warm, no clubbing, no cyanosis, no rashes.  There are 2 small puncture wounds to patient's right mid forearm, measuring approximately 2 mm and 1 cm.  No active bleeding, no surrounding swelling or erythema, distal neurovascular is intact.  No underlying bony tenderness.  Psych: appropriate mood, affect, judgement.         Assessment/Plan:  1. Cat bite, initial encounter  TD =>6yo IM    amoxicillin-clavulanate (AUGMENTIN) 875-125 MG Tab       Area cleaned thoroughly, Band-Aids applied.  Patient is updated on her tetanus.  Augmentin as directed.  STRICT ER precautions advised.  Return to clinic in 48 hours for reevaluation.  Supportive care, differential diagnoses, and indications for immediate follow-up discussed with patient.   Pathogenesis of diagnosis discussed including typical length and natural progression.   Instructed to return to clinic or nearest emergency department for any change in condition, further concerns, or worsening of symptoms.  Patient states understanding of the plan of care and  discharge instructions.  Instructed to make an appointment, for follow up, with her primary care provider.        Please note that this dictation was created using voice recognition software. I have made every reasonable attempt to correct obvious errors, but I expect that there are errors of grammar and possibly content that I did not discover before finalizing the note.      ROSELYN Marie.

## 2019-06-11 ENCOUNTER — APPOINTMENT (OUTPATIENT)
Dept: WOUND CARE | Facility: MEDICAL CENTER | Age: 79
End: 2019-06-11
Attending: DERMATOLOGY
Payer: MEDICARE

## 2019-06-17 ENCOUNTER — APPOINTMENT (RX ONLY)
Dept: URBAN - METROPOLITAN AREA CLINIC 35 | Facility: CLINIC | Age: 79
Setting detail: DERMATOLOGY
End: 2019-06-17

## 2019-06-17 DIAGNOSIS — L57.0 ACTINIC KERATOSIS: ICD-10-CM

## 2019-06-17 DIAGNOSIS — Z48.817 ENCOUNTER FOR SURGICAL AFTERCARE FOLLOWING SURGERY ON THE SKIN AND SUBCUTANEOUS TISSUE: ICD-10-CM | Status: IMPROVED

## 2019-06-17 DIAGNOSIS — Z41.9 ENCOUNTER FOR PROCEDURE FOR PURPOSES OTHER THAN REMEDYING HEALTH STATE, UNSPECIFIED: ICD-10-CM

## 2019-06-17 PROCEDURE — ? ADDITIONAL NOTES

## 2019-06-17 PROCEDURE — 17003 DESTRUCT PREMALG LES 2-14: CPT

## 2019-06-17 PROCEDURE — ? POST-OP WOUND CHECK (NO GLOBAL PERIOD)

## 2019-06-17 PROCEDURE — 99212 OFFICE O/P EST SF 10 MIN: CPT | Mod: 25

## 2019-06-17 PROCEDURE — 17000 DESTRUCT PREMALG LESION: CPT

## 2019-06-17 PROCEDURE — ? LIQUID NITROGEN

## 2019-06-17 PROCEDURE — ? BOTOX

## 2019-06-17 ASSESSMENT — LOCATION SIMPLE DESCRIPTION DERM
LOCATION SIMPLE: LEFT HAND
LOCATION SIMPLE: RIGHT HAND
LOCATION SIMPLE: RIGHT PRETIBIAL REGION

## 2019-06-17 ASSESSMENT — LOCATION DETAILED DESCRIPTION DERM
LOCATION DETAILED: LEFT RADIAL DORSAL HAND
LOCATION DETAILED: RIGHT RADIAL DORSAL HAND
LOCATION DETAILED: RIGHT PROXIMAL PRETIBIAL REGION
LOCATION DETAILED: LEFT ULNAR DORSAL HAND
LOCATION DETAILED: RIGHT ULNAR DORSAL HAND

## 2019-06-17 ASSESSMENT — LOCATION ZONE DERM
LOCATION ZONE: HAND
LOCATION ZONE: LEG

## 2019-06-17 NOTE — PROCEDURE: ADDITIONAL NOTES
Detail Level: Simple
Additional Notes: Patient had a molar on the right bottom jaw extracted this morning. Advised patient to wait 2 weeks to heal before she can have Filler.

## 2019-06-17 NOTE — PROCEDURE: LIQUID NITROGEN
Render Post-Care Instructions In Note?: no
Duration Of Freeze Thaw-Cycle (Seconds): 2
Detail Level: Detailed
Consent: The patient's consent was obtained including but not limited to risks of crusting, scabbing, blistering, scarring, darker or lighter pigmentary change, recurrence, incomplete removal and infection.
Post-Care Instructions: I reviewed with the patient in detail post-care instructions. Patient is to wear sunprotection, and avoid picking at any of the treated lesions. Pt may apply Vaseline to crusted or scabbing areas.
Number Of Freeze-Thaw Cycles: 2 freeze-thaw cycles

## 2019-06-17 NOTE — PROCEDURE: ADDITIONAL NOTES
Additional Notes: Pt has been discharged from wound care.\\nPt has triamcinolone at home and I instructed her to apply it to her eczematous patches BID x 10 days.\\nReturn to office if not improved.
Detail Level: Zone

## 2019-06-24 ENCOUNTER — APPOINTMENT (RX ONLY)
Dept: URBAN - METROPOLITAN AREA CLINIC 35 | Facility: CLINIC | Age: 79
Setting detail: DERMATOLOGY
End: 2019-06-24

## 2019-06-24 DIAGNOSIS — L81.4 OTHER MELANIN HYPERPIGMENTATION: ICD-10-CM

## 2019-06-24 DIAGNOSIS — T07XXXA ABRASION OR FRICTION BURN OF OTHER, MULTIPLE, AND UNSPECIFIED SITES, WITHOUT MENTION OF INFECTION: ICD-10-CM

## 2019-06-24 DIAGNOSIS — Z71.89 OTHER SPECIFIED COUNSELING: ICD-10-CM

## 2019-06-24 DIAGNOSIS — Z85.828 PERSONAL HISTORY OF OTHER MALIGNANT NEOPLASM OF SKIN: ICD-10-CM

## 2019-06-24 DIAGNOSIS — L30.9 DERMATITIS, UNSPECIFIED: ICD-10-CM

## 2019-06-24 DIAGNOSIS — L57.0 ACTINIC KERATOSIS: ICD-10-CM

## 2019-06-24 DIAGNOSIS — L82.1 OTHER SEBORRHEIC KERATOSIS: ICD-10-CM

## 2019-06-24 DIAGNOSIS — L81.8 OTHER SPECIFIED DISORDERS OF PIGMENTATION: ICD-10-CM

## 2019-06-24 PROBLEM — S50.811A ABRASION OF RIGHT FOREARM, INITIAL ENCOUNTER: Status: ACTIVE | Noted: 2019-06-24

## 2019-06-24 PROCEDURE — ? COUNSELING

## 2019-06-24 PROCEDURE — ? BENIGN DESTRUCTION

## 2019-06-24 PROCEDURE — 17000 DESTRUCT PREMALG LESION: CPT | Mod: 79

## 2019-06-24 PROCEDURE — 17003 DESTRUCT PREMALG LES 2-14: CPT | Mod: 79

## 2019-06-24 PROCEDURE — ? ADDITIONAL NOTES

## 2019-06-24 PROCEDURE — ? LIQUID NITROGEN

## 2019-06-24 PROCEDURE — 99214 OFFICE O/P EST MOD 30 MIN: CPT | Mod: 25,24

## 2019-06-24 ASSESSMENT — LOCATION DETAILED DESCRIPTION DERM
LOCATION DETAILED: LEFT POSTERIOR SHOULDER
LOCATION DETAILED: RIGHT PROXIMAL PRETIBIAL REGION
LOCATION DETAILED: RIGHT LATERAL BREAST 6-7:00 REGION
LOCATION DETAILED: LEFT NASAL ALA
LOCATION DETAILED: NASAL DORSUM
LOCATION DETAILED: LEFT SUPERIOR CENTRAL MALAR CHEEK
LOCATION DETAILED: RIGHT DISTAL PRETIBIAL REGION
LOCATION DETAILED: RIGHT DISTAL ULNAR DORSAL FOREARM
LOCATION DETAILED: LEFT MEDIAL DISTAL PRETIBIAL REGION
LOCATION DETAILED: RIGHT MEDIAL DISTAL PRETIBIAL REGION
LOCATION DETAILED: RIGHT CENTRAL MALAR CHEEK
LOCATION DETAILED: RIGHT SUPERIOR MEDIAL MIDBACK
LOCATION DETAILED: LEFT MEDIAL MALAR CHEEK
LOCATION DETAILED: RIGHT PROXIMAL LATERAL POSTERIOR UPPER ARM
LOCATION DETAILED: LEFT PROXIMAL DORSAL FOREARM
LOCATION DETAILED: LEFT SUPERIOR LATERAL LOWER BACK
LOCATION DETAILED: LEFT SUPERIOR UPPER BACK
LOCATION DETAILED: RIGHT INFERIOR ANTERIOR NECK
LOCATION DETAILED: RIGHT LATERAL SUPERIOR CHEST
LOCATION DETAILED: RIGHT INFERIOR MEDIAL FOREHEAD
LOCATION DETAILED: LEFT MEDIAL SUPERIOR CHEST
LOCATION DETAILED: LEFT CENTRAL TEMPLE
LOCATION DETAILED: RIGHT CENTRAL ZYGOMA
LOCATION DETAILED: EPIGASTRIC SKIN
LOCATION DETAILED: LEFT CLAVICULAR SKIN
LOCATION DETAILED: RIGHT MID-UPPER BACK
LOCATION DETAILED: RIGHT CENTRAL TEMPLE
LOCATION DETAILED: RIGHT POSTERIOR SHOULDER
LOCATION DETAILED: LEFT SUPERIOR CENTRAL BUCCAL CHEEK
LOCATION DETAILED: RIGHT PROXIMAL DORSAL FOREARM

## 2019-06-24 ASSESSMENT — LOCATION SIMPLE DESCRIPTION DERM
LOCATION SIMPLE: LEFT FOREARM
LOCATION SIMPLE: RIGHT POSTERIOR UPPER ARM
LOCATION SIMPLE: RIGHT FOREARM
LOCATION SIMPLE: RIGHT TEMPLE
LOCATION SIMPLE: LEFT SHOULDER
LOCATION SIMPLE: RIGHT PRETIBIAL REGION
LOCATION SIMPLE: NOSE
LOCATION SIMPLE: RIGHT SHOULDER
LOCATION SIMPLE: LEFT TEMPLE
LOCATION SIMPLE: LEFT CLAVICULAR SKIN
LOCATION SIMPLE: RIGHT FOREHEAD
LOCATION SIMPLE: RIGHT LOWER BACK
LOCATION SIMPLE: CHEST
LOCATION SIMPLE: RIGHT CHEEK
LOCATION SIMPLE: ABDOMEN
LOCATION SIMPLE: LEFT LOWER BACK
LOCATION SIMPLE: LEFT NOSE
LOCATION SIMPLE: LEFT CHEEK
LOCATION SIMPLE: LEFT UPPER BACK
LOCATION SIMPLE: RIGHT UPPER BACK
LOCATION SIMPLE: RIGHT ZYGOMA
LOCATION SIMPLE: RIGHT BREAST
LOCATION SIMPLE: LEFT PRETIBIAL REGION
LOCATION SIMPLE: RIGHT ANTERIOR NECK

## 2019-06-24 ASSESSMENT — LOCATION ZONE DERM
LOCATION ZONE: ARM
LOCATION ZONE: NECK
LOCATION ZONE: FACE
LOCATION ZONE: NOSE
LOCATION ZONE: LEG
LOCATION ZONE: TRUNK

## 2019-06-24 NOTE — PROCEDURE: ADDITIONAL NOTES
Additional Notes: Cleansed with Levicyn gel and applied steri strips. Discussed wound care.
Detail Level: Zone
Additional Notes: Continue applying Triamcinolone twice a day. \\nSamples provided for Levicyn gel use twice a day.

## 2019-06-24 NOTE — PROCEDURE: LIQUID NITROGEN
Number Of Freeze-Thaw Cycles: 2 freeze-thaw cycles
Post-Care Instructions: I reviewed with the patient in detail post-care instructions. Patient is to wear sunprotection, and avoid picking at any of the treated lesions. Pt may apply Vaseline to crusted or scabbing areas.
Render Note In Bullet Format When Appropriate: No
Consent: The patient's consent was obtained including but not limited to risks of crusting, scabbing, blistering, scarring, darker or lighter pigmentary change, recurrence, incomplete removal and infection.
Detail Level: Detailed
Duration Of Freeze Thaw-Cycle (Seconds): 2

## 2019-07-03 ENCOUNTER — APPOINTMENT (RX ONLY)
Dept: URBAN - METROPOLITAN AREA CLINIC 35 | Facility: CLINIC | Age: 79
Setting detail: DERMATOLOGY
End: 2019-07-03

## 2019-07-03 DIAGNOSIS — Z41.9 ENCOUNTER FOR PROCEDURE FOR PURPOSES OTHER THAN REMEDYING HEALTH STATE, UNSPECIFIED: ICD-10-CM

## 2019-07-03 PROCEDURE — ? FILLERS

## 2019-07-03 NOTE — PROCEDURE: FILLERS
Mid Face Filler Volume In Cc: 0
Additional Area 1 Location: Oral Commisures
Additional Area 3 Location: Fine lines around mouth
Filler Comments: 0.4cc of Juvederm was blended 1:1.5 with lidocaine no epinephrine and stranded to the cheeks.
Filler: Juvederm Ultra XC
Additional Area 5 Location: Earlobes
Cheeks Filler Volume In Cc: 0.4
Include Cannula Information In Note?: No
Additional Area 2 Location: Border of lips
Additional Area 4 Location: Scars
Post-Care Instructions: Patient instructed to apply ice to reduce swelling.
Lot #: [id number]
Map Statment: See Attach Map for Complete Details
Use Map Statement For Sites (Optional): Yes
Expiration Date (Month Year): 2020.02.26
Consent: Written consent obtained. Risks include but not limited to bruising, beading, irregular texture, ulceration, infection, allergic reaction, scar formation, incomplete augmentation, temporary nature, procedural pain.
Additional Area 1 Volume In Cc: 0.2
Detail Level: Detailed
Price (Use Numbers Only, No Special Characters Or $): 197

## 2019-07-15 ENCOUNTER — TELEPHONE (OUTPATIENT)
Dept: MEDICAL GROUP | Facility: MEDICAL CENTER | Age: 79
End: 2019-07-15

## 2019-07-15 NOTE — TELEPHONE ENCOUNTER
Please have her schedule appointment or go to urgent care so she can be properly evaluated and treated.  Flavio Cooper M.D.

## 2019-07-15 NOTE — TELEPHONE ENCOUNTER
VOICEMAIL  1. Caller Name: Afsaneh                      Call Back Number: [phone]    2. Message: Patient reports an acid burning in her stomach frequently. She has tried 24-hour Prilosec and had no relief. Please advise.    3. Patient approves office to leave a detailed voicemail/MyChart message: N\A

## 2019-07-19 ENCOUNTER — OFFICE VISIT (OUTPATIENT)
Dept: MEDICAL GROUP | Facility: MEDICAL CENTER | Age: 79
End: 2019-07-19
Payer: MEDICARE

## 2019-07-19 VITALS
OXYGEN SATURATION: 94 % | WEIGHT: 120 LBS | HEIGHT: 67 IN | HEART RATE: 73 BPM | TEMPERATURE: 97.3 F | BODY MASS INDEX: 18.83 KG/M2 | DIASTOLIC BLOOD PRESSURE: 74 MMHG | SYSTOLIC BLOOD PRESSURE: 116 MMHG

## 2019-07-19 DIAGNOSIS — F32.0 CURRENT MILD EPISODE OF MAJOR DEPRESSIVE DISORDER WITHOUT PRIOR EPISODE (HCC): ICD-10-CM

## 2019-07-19 DIAGNOSIS — Z12.31 ENCOUNTER FOR SCREENING MAMMOGRAM FOR BREAST CANCER: ICD-10-CM

## 2019-07-19 DIAGNOSIS — K21.9 GASTROESOPHAGEAL REFLUX DISEASE WITHOUT ESOPHAGITIS: ICD-10-CM

## 2019-07-19 PROCEDURE — 99214 OFFICE O/P EST MOD 30 MIN: CPT | Performed by: FAMILY MEDICINE

## 2019-07-19 RX ORDER — FLUOXETINE HYDROCHLORIDE 40 MG/1
40 CAPSULE ORAL
Qty: 90 CAP | Refills: 3 | Status: SHIPPED | OUTPATIENT
Start: 2019-07-19 | End: 2019-09-30

## 2019-07-19 NOTE — PROGRESS NOTES
"Subjective:   Afsaneh Lyn is a 79 y.o. female here today for GERD and depression    GERD (gastroesophageal reflux disease)  Has been having a lot of burning in stomach for the last 1 month. 4 days ago started on Nexium, which has helped. Symptoms have resolved today.  Has been having very high levels of stress.    Current mild episode of major depressive disorder without prior episode (HCC)  Has been having decreased mood because one daughter recently finished tamoxifen and her other daughter was just diagnosed with breast cancer.         Current medicines (including changes today)  Current Outpatient Prescriptions   Medication Sig Dispense Refill   • FLUoxetine (PROZAC) 40 MG capsule Take 1 Cap by mouth every day. 90 Cap 3   • mupirocin calcium (BACTROBAN) 2 % Cream Apply 1 Application to affected area(s) 2 times a day. 1 Tube 0   • estradiol (CLIMARA) 0.025 MG/24HR PATCH WEEKLY APPLY 1 PATCH TO A CLEAN DRY AREA OF THE SKIN ONCE A WEEK 12 Patch 3   • levothyroxine (SYNTHROID) 150 MCG Tab TAKE 1 TABLET BY MOUTH EVERY MORNING BEFORE BREAKFAST ON AN EMPTY STOMACH 90 Tab 3   • triamcinolone acetonide (KENALOG) 0.1 % Ointment Apply 1 Application to affected area(s) 1 time daily as needed. 1 Tube 0   • Aspirin-Acetaminophen-Caffeine (EXCEDRIN PO) Take  by mouth. Last dose 3 pm        No current facility-administered medications for this visit.      She  has a past medical history of Diverticulosis; Hypothyroid; IBS (irritable bowel syndrome); Migraines; and Mitral valve prolapse. She also has no past medical history of Breast cancer (HCC).    ROS   No suicidal ideation, no nausea today       Objective:     /74 (BP Location: Right arm, Patient Position: Sitting)   Pulse 73   Temp 36.3 °C (97.3 °F)   Ht 1.702 m (5' 7\")   Wt 54.4 kg (120 lb)   SpO2 94%  Body mass index is 18.79 kg/m².   Physical Exam:  Constitutional: Alert, no distress.  Skin: Warm, dry, good turgor, no rashes in visible areas.  Eye: " Equal, round and reactive, conjunctiva clear, lids normal.  Psych: Alert and oriented x3, decreased mood.        Assessment and Plan:   The following treatment plan was discussed    1. Gastroesophageal reflux disease without esophagitis  Uncontrolled. Continue Nexium 20 mg daily.    2. Current mild episode of major depressive disorder without prior episode (HCC)  Uncontrolled. Increase Prozac 40 mg daily. If no improvement consider Wellbutrin.  - FLUoxetine (PROZAC) 40 MG capsule; Take 1 Cap by mouth every day.  Dispense: 90 Cap; Refill: 3    3. Encounter for screening mammogram for breast cancer  - MA-SCREEN MAMMO W/CAD-BILAT; Future      Followup: Return if symptoms worsen or fail to improve.

## 2019-07-19 NOTE — ASSESSMENT & PLAN NOTE
Has been having a lot of burning in stomach for the last 1 month. 4 days ago started on Nexium, which has helped. Symptoms have resolved today.  Has been having very high levels of stress.

## 2019-07-19 NOTE — ASSESSMENT & PLAN NOTE
Has been having decreased mood because one daughter recently finished tamoxifen and her other daughter was just diagnosed with breast cancer.

## 2019-08-07 ENCOUNTER — OFFICE VISIT (OUTPATIENT)
Dept: URGENT CARE | Facility: CLINIC | Age: 79
End: 2019-08-07
Payer: MEDICARE

## 2019-08-07 VITALS
OXYGEN SATURATION: 96 % | RESPIRATION RATE: 18 BRPM | HEART RATE: 103 BPM | WEIGHT: 113 LBS | TEMPERATURE: 98.1 F | BODY MASS INDEX: 17.74 KG/M2 | DIASTOLIC BLOOD PRESSURE: 86 MMHG | SYSTOLIC BLOOD PRESSURE: 126 MMHG | HEIGHT: 67 IN

## 2019-08-07 DIAGNOSIS — J01.00 ACUTE NON-RECURRENT MAXILLARY SINUSITIS: ICD-10-CM

## 2019-08-07 PROCEDURE — 99214 OFFICE O/P EST MOD 30 MIN: CPT | Performed by: PHYSICIAN ASSISTANT

## 2019-08-07 RX ORDER — AMOXICILLIN AND CLAVULANATE POTASSIUM 875; 125 MG/1; MG/1
1 TABLET, FILM COATED ORAL 2 TIMES DAILY
Qty: 14 TAB | Refills: 0 | Status: SHIPPED | OUTPATIENT
Start: 2019-08-07 | End: 2019-08-14

## 2019-08-07 ASSESSMENT — ENCOUNTER SYMPTOMS
HEADACHES: 0
WHEEZING: 0
SINUS PRESSURE: 1
PALPITATIONS: 0
EYE DISCHARGE: 0
STRIDOR: 0
SINUS PAIN: 1
EYE PAIN: 0
SHORTNESS OF BREATH: 0
FEVER: 0
COUGH: 0
SPUTUM PRODUCTION: 0
HEMOPTYSIS: 0
EYE REDNESS: 0
CHILLS: 0
SORE THROAT: 1

## 2019-08-07 NOTE — PROGRESS NOTES
Subjective:      Arielle Lyn is a 79 y.o. female who presents with Pharyngitis (x3 weeks getting worse)            Sinus Problem   The current episode started 1 to 4 weeks ago. The problem is unchanged. There has been no fever. Associated symptoms include congestion, ear pain, sinus pressure and a sore throat. Pertinent negatives include no chills, coughing, headaches or shortness of breath. Past treatments include oral decongestants and antibiotics.       Review of Systems   Constitutional: Positive for malaise/fatigue. Negative for chills and fever.   HENT: Positive for congestion, ear pain, sinus pressure, sinus pain and sore throat. Negative for ear discharge.    Eyes: Negative for pain, discharge and redness.   Respiratory: Negative for cough, hemoptysis, sputum production, shortness of breath, wheezing and stridor.    Cardiovascular: Negative for chest pain and palpitations.   Skin: Negative for itching and rash.   Neurological: Negative for headaches.   All other systems reviewed and are negative.    PMH:  has a past medical history of Diverticulosis, Hypothyroid, IBS (irritable bowel syndrome), Migraines, and Mitral valve prolapse. She also has no past medical history of Breast cancer ([facility]).  MEDS:   Current Outpatient Medications:   •  amoxicillin-clavulanate (AUGMENTIN) 875-125 MG Tab, Take 1 Tab by mouth 2 times a day for 7 days., Disp: 14 Tab, Rfl: 0  •  maalox plus-benadryl-visc lidocaine (MAGIC MOUTHWASH), Gargle and spit 5 mL every 6 hours as needed for pain., Disp: 100 mL, Rfl: 0  •  FLUoxetine (PROZAC) 40 MG capsule, Take 1 Cap by mouth every day., Disp: 90 Cap, Rfl: 3  •  mupirocin calcium (BACTROBAN) 2 % Cream, Apply 1 Application to affected area(s) 2 times a day., Disp: 1 Tube, Rfl: 0  •  estradiol (CLIMARA) 0.025 MG/24HR PATCH WEEKLY, APPLY 1 PATCH TO A CLEAN DRY AREA OF THE SKIN ONCE A WEEK, Disp: 12 Patch, Rfl: 3  •  levothyroxine (SYNTHROID) 150 MCG Tab, TAKE 1 TABLET BY MOUTH EVERY  "MORNING BEFORE BREAKFAST ON AN EMPTY STOMACH, Disp: 90 Tab, Rfl: 3  •  triamcinolone acetonide (KENALOG) 0.1 % Ointment, Apply 1 Application to affected area(s) 1 time daily as needed., Disp: 1 Tube, Rfl: 0  •  Aspirin-Acetaminophen-Caffeine (EXCEDRIN PO), Take  by mouth. Last dose 3 pm , Disp: , Rfl:   ALLERGIES:   Allergies   Allergen Reactions   • Macrobid [Nitrofurantoin Monohydrate Macrocrystals] Nausea     Cramp in legs     SURGHX:   Past Surgical History:   Procedure Laterality Date   • ABDOMINAL HYSTERECTOMY TOTAL      Hysterectomy, Total Abdominal   • PB ENLARGE BREAST WITH IMPLANT     • US-NEEDLE CORE BX-BREAST PANEL       SOCHX:  reports that she has never smoked. She has never used smokeless tobacco. She reports that she drinks about 0.6 oz of alcohol per week. She reports that she does not use drugs.  FH: Family history was reviewed, no pertinent findings to report  Medications, Allergies, and current problem list reviewed today in Epic       Objective:     /86 (BP Location: Right arm)   Pulse (!) 103   Temp 36.7 °C (98.1 °F) (Temporal)   Resp 18   Ht 1.702 m (5' 7\")   Wt 51.3 kg (113 lb)   SpO2 96%   BMI 17.70 kg/m²      Physical Exam   Constitutional: She is oriented to person, place, and time. She appears well-developed and well-nourished.  Non-toxic appearance. She does not have a sickly appearance. She does not appear ill. No distress.   HENT:   Head: Normocephalic and atraumatic.   Right Ear: Hearing, tympanic membrane, external ear and ear canal normal.   Left Ear: Hearing, tympanic membrane, external ear and ear canal normal.   Nose: Mucosal edema and rhinorrhea present. No sinus tenderness. No epistaxis. Right sinus exhibits maxillary sinus tenderness. Left sinus exhibits maxillary sinus tenderness.   Mouth/Throat: Uvula is midline, oropharynx is clear and moist and mucous membranes are normal.   Eyes: Conjunctivae and EOM are normal.   Neck: Normal range of motion. Neck supple. "   Cardiovascular: Normal rate, regular rhythm, normal heart sounds and intact distal pulses.   Pulmonary/Chest: Effort normal and breath sounds normal.   Neurological: She is alert and oriented to person, place, and time.   Skin: Skin is warm and dry.   Psychiatric: She has a normal mood and affect. Her behavior is normal. Judgment and thought content normal.   Vitals reviewed.              Assessment/Plan:     1. Acute non-recurrent maxillary sinusitis    - amoxicillin-clavulanate (AUGMENTIN) 875-125 MG Tab; Take 1 Tab by mouth 2 times a day for 7 days.  Dispense: 14 Tab; Refill: 0  - maalox plus-benadryl-visc lidocaine (MAGIC MOUTHWASH); Gargle and spit 5 mL every 6 hours as needed for pain.  Dispense: 100 mL; Refill: 0    Differential diagnosis, natural history, supportive care discussed. Follow-up with primary care provider within 7-10 days, emergency room precautions discussed.  Patient and/or family appears understanding of information.  Handout and review of patients diagnosis and treatment was discussed extensively.

## 2019-09-09 ENCOUNTER — APPOINTMENT (RX ONLY)
Dept: URBAN - METROPOLITAN AREA CLINIC 35 | Facility: CLINIC | Age: 79
Setting detail: DERMATOLOGY
End: 2019-09-09

## 2019-09-09 DIAGNOSIS — L82.1 OTHER SEBORRHEIC KERATOSIS: ICD-10-CM

## 2019-09-09 DIAGNOSIS — L71.8 OTHER ROSACEA: ICD-10-CM

## 2019-09-09 DIAGNOSIS — L30.9 DERMATITIS, UNSPECIFIED: ICD-10-CM

## 2019-09-09 DIAGNOSIS — L81.8 OTHER SPECIFIED DISORDERS OF PIGMENTATION: ICD-10-CM

## 2019-09-09 DIAGNOSIS — Z85.828 PERSONAL HISTORY OF OTHER MALIGNANT NEOPLASM OF SKIN: ICD-10-CM

## 2019-09-09 DIAGNOSIS — L81.4 OTHER MELANIN HYPERPIGMENTATION: ICD-10-CM

## 2019-09-09 DIAGNOSIS — Z71.89 OTHER SPECIFIED COUNSELING: ICD-10-CM

## 2019-09-09 DIAGNOSIS — L57.0 ACTINIC KERATOSIS: ICD-10-CM

## 2019-09-09 PROCEDURE — 17003 DESTRUCT PREMALG LES 2-14: CPT

## 2019-09-09 PROCEDURE — ? COUNSELING

## 2019-09-09 PROCEDURE — ? BENIGN DESTRUCTION

## 2019-09-09 PROCEDURE — ? LIQUID NITROGEN

## 2019-09-09 PROCEDURE — 99214 OFFICE O/P EST MOD 30 MIN: CPT | Mod: 25

## 2019-09-09 PROCEDURE — 17000 DESTRUCT PREMALG LESION: CPT

## 2019-09-09 PROCEDURE — ? PRESCRIPTION

## 2019-09-09 RX ORDER — METRONIDAZOLE 7.5 MG/G
1 CREAM TOPICAL TWICE DAILY
Qty: 1 | Refills: 6 | Status: ERX | COMMUNITY
Start: 2019-09-09

## 2019-09-09 RX ORDER — TRIAMCINOLONE ACETONIDE 1 MG/G
1 CREAM TOPICAL BID
Qty: 1 | Refills: 1 | Status: ERX | COMMUNITY
Start: 2019-09-09 | End: 2022-02-15

## 2019-09-09 RX ADMIN — TRIAMCINOLONE ACETONIDE 1: 1 CREAM TOPICAL at 00:00

## 2019-09-09 RX ADMIN — METRONIDAZOLE 1: 7.5 CREAM TOPICAL at 00:00

## 2019-09-09 ASSESSMENT — LOCATION SIMPLE DESCRIPTION DERM
LOCATION SIMPLE: RIGHT PRETIBIAL REGION
LOCATION SIMPLE: RIGHT BREAST
LOCATION SIMPLE: NOSE
LOCATION SIMPLE: RIGHT ELBOW
LOCATION SIMPLE: LEFT UPPER BACK
LOCATION SIMPLE: RIGHT POSTERIOR UPPER ARM
LOCATION SIMPLE: RIGHT CHEEK
LOCATION SIMPLE: LEFT SHOULDER
LOCATION SIMPLE: LEFT PRETIBIAL REGION
LOCATION SIMPLE: LEFT FOREARM
LOCATION SIMPLE: RIGHT FOREARM
LOCATION SIMPLE: LEFT LOWER BACK
LOCATION SIMPLE: ABDOMEN
LOCATION SIMPLE: RIGHT FOREHEAD
LOCATION SIMPLE: LEFT POSTERIOR UPPER ARM
LOCATION SIMPLE: LEFT CHEEK
LOCATION SIMPLE: CHEST

## 2019-09-09 ASSESSMENT — LOCATION DETAILED DESCRIPTION DERM
LOCATION DETAILED: NASAL DORSUM
LOCATION DETAILED: LEFT POSTERIOR SHOULDER
LOCATION DETAILED: RIGHT PROXIMAL DORSAL FOREARM
LOCATION DETAILED: LEFT SUPERIOR CENTRAL BUCCAL CHEEK
LOCATION DETAILED: LEFT INFERIOR MEDIAL MIDBACK
LOCATION DETAILED: LEFT PROXIMAL DORSAL FOREARM
LOCATION DETAILED: RIGHT PROXIMAL PRETIBIAL REGION
LOCATION DETAILED: LEFT MEDIAL SUPERIOR CHEST
LOCATION DETAILED: RIGHT CENTRAL MALAR CHEEK
LOCATION DETAILED: LEFT MEDIAL DISTAL PRETIBIAL REGION
LOCATION DETAILED: RIGHT INFERIOR MEDIAL FOREHEAD
LOCATION DETAILED: LEFT LATERAL MANDIBULAR CHEEK
LOCATION DETAILED: RIGHT ELBOW
LOCATION DETAILED: RIGHT MEDIAL DISTAL PRETIBIAL REGION
LOCATION DETAILED: LEFT INFERIOR MEDIAL MALAR CHEEK
LOCATION DETAILED: RIGHT PROXIMAL LATERAL POSTERIOR UPPER ARM
LOCATION DETAILED: RIGHT LATERAL BREAST 6-7:00 REGION
LOCATION DETAILED: LEFT SUPERIOR UPPER BACK
LOCATION DETAILED: RIGHT LATERAL SUPERIOR CHEST
LOCATION DETAILED: LEFT LATERAL BUCCAL CHEEK
LOCATION DETAILED: LEFT DISTAL POSTERIOR UPPER ARM
LOCATION DETAILED: EPIGASTRIC SKIN

## 2019-09-09 ASSESSMENT — LOCATION ZONE DERM
LOCATION ZONE: ARM
LOCATION ZONE: NOSE
LOCATION ZONE: FACE
LOCATION ZONE: TRUNK
LOCATION ZONE: LEG

## 2019-09-09 NOTE — PROCEDURE: BENIGN DESTRUCTION
Medical Necessity Information: It is in your best interest to select a reason for this procedure from the list below. All of these items fulfill various CMS LCD requirements except the new and changing color options.
Render Note In Bullet Format When Appropriate: No
Detail Level: Detailed
Consent: The patient's consent was obtained including but not limited to risks of crusting, scabbing, blistering, scarring, darker or lighter pigmentary change, recurrence, incomplete removal and infection.
Medical Necessity Clause: This procedure was medically necessary because the lesions that were treated were:
Post-Care Instructions: I reviewed with the patient in detail post-care instructions. Patient is to wear sunprotection, and avoid picking at any of the treated lesions. Pt may apply Vaseline to crusted or scabbing areas.
Anesthesia Volume In Cc: 0

## 2019-09-26 ENCOUNTER — APPOINTMENT (RX ONLY)
Dept: URBAN - METROPOLITAN AREA CLINIC 35 | Facility: CLINIC | Age: 79
Setting detail: DERMATOLOGY
End: 2019-09-26

## 2019-09-26 ENCOUNTER — NON-PROVIDER VISIT (OUTPATIENT)
Dept: MEDICAL GROUP | Facility: MEDICAL CENTER | Age: 79
End: 2019-09-26
Payer: MEDICARE

## 2019-09-26 DIAGNOSIS — Z41.9 ENCOUNTER FOR PROCEDURE FOR PURPOSES OTHER THAN REMEDYING HEALTH STATE, UNSPECIFIED: ICD-10-CM

## 2019-09-26 DIAGNOSIS — Z23 NEED FOR VACCINATION: ICD-10-CM

## 2019-09-26 PROCEDURE — 90662 IIV NO PRSV INCREASED AG IM: CPT | Performed by: FAMILY MEDICINE

## 2019-09-26 PROCEDURE — G0008 ADMIN INFLUENZA VIRUS VAC: HCPCS | Performed by: FAMILY MEDICINE

## 2019-09-26 PROCEDURE — ? FILLERS

## 2019-09-26 NOTE — PROGRESS NOTES
"Arielle Lyn is a 79 y.o. female here for a non-provider visit for:   FLU    Reason for immunization: Annual Flu Vaccine  Immunization records indicate need for vaccine: Yes, confirmed with Epic  Minimum interval has been met for this vaccine: Yes  ABN completed: No    Order and dose verified by: CECILIA  VIS Dated  8/7/15 was given to patient: Yes  All IAC Questionnaire questions were answered \"No.\"    Patient tolerated injection and no adverse effects were observed or reported: Yes    Pt scheduled for next dose in series: No    "

## 2019-09-26 NOTE — PROCEDURE: FILLERS
Jawline Filler Volume In Cc: 0
Use Map Statement For Sites (Optional): Yes
Additional Area 4 Location: Scars
Additional Area 2 Location: Border of lips
Consent: Written consent obtained. Risks include but not limited to bruising, beading, irregular texture, ulceration, infection, allergic reaction, scar formation, incomplete augmentation, temporary nature, procedural pain.
Additional Area 3 Location: Fine lines around mouth
Additional Area 1 Volume In Cc: 0.3
Map Statment: See Attach Map for Complete Details
Include Cannula Information In Note?: No
Filler: Juvederm Ultra XC
Additional Area 1 Location: Oral Commisures
Expiration Date (Month Year): 10/02/2020
Nasolabial Folds Filler Volume In Cc: 0.2
Expiration Date (Month Year): 08/04/2020
Post-Care Instructions: Patient instructed to apply ice to reduce swelling.
Additional Area 5 Location: Earlobes
Filler Comments: 0.2 cc upper lip\\n0.2 cc lower lip\\n0.4 cc of Juvederm Ultra XC was blended 1:1 with lidocaine no epinephrine and stranded to cheeks
Lot #: [id number]
Detail Level: Detailed
Filler Comments: 0.8 cc of Juvederm Vollure was blended 1:2 with lidocaine no epinephrine and stranded to cheeks
Price (Use Numbers Only, No Special Characters Or $): 1300
Lot #: [id number]
Filler: Juvederm Vollure XC

## 2019-09-28 DIAGNOSIS — F33.41 RECURRENT MAJOR DEPRESSIVE DISORDER, IN PARTIAL REMISSION (HCC): ICD-10-CM

## 2019-09-30 RX ORDER — FLUOXETINE HYDROCHLORIDE 20 MG/1
CAPSULE ORAL
Qty: 90 CAP | Refills: 3 | Status: SHIPPED | OUTPATIENT
Start: 2019-09-30 | End: 2020-11-20 | Stop reason: SDUPTHER

## 2019-10-09 ENCOUNTER — APPOINTMENT (RX ONLY)
Dept: URBAN - METROPOLITAN AREA CLINIC 35 | Facility: CLINIC | Age: 79
Setting detail: DERMATOLOGY
End: 2019-10-09

## 2019-10-09 DIAGNOSIS — Z41.9 ENCOUNTER FOR PROCEDURE FOR PURPOSES OTHER THAN REMEDYING HEALTH STATE, UNSPECIFIED: ICD-10-CM

## 2019-10-09 PROCEDURE — ? FILLERS

## 2019-10-09 PROCEDURE — ? BOTOX

## 2019-10-09 NOTE — PROCEDURE: BOTOX
Depressor Anguli Oris Units: 0
Additional Area 1 Units: 16
Detail Level: Detailed
Expiration Date (Month Year): 04/22
Price (Use Numbers Only, No Special Characters Or $): 559
Lot #: [id number]
Additional Area 1 Location: [place]
Additional Area 2 Units: 34
Dilution (U/0.1 Cc): 5
Additional Area 6 Location: platysma
Post-Care Instructions: Patient instructed to not lie down for 4 hours after injections and limit physical activity for 24 hours.
Additional Area 2 Location: [place]
Additional Area 4 Location: Bunny lines
Reconstitution Date (Optional): 10/9/19
Consent: Verbal and written informed consent were obtained to include the following risks: pain, swelling, bruising, eyelid or eyebrow droop, and lack of visible improvement of wrinkles in the areas treated.  The skin was cleansed with alcohol. Injections were administered with a 32g needle into the following areas:
Additional Area 5 Location: perioral
Additional Area 3 Location: [place]

## 2019-10-09 NOTE — PROCEDURE: FILLERS
Additional Area 3 Location: Fine lines around mouth
Dorsal Hands Filler Volume In Cc: 0
Additional Area 4 Location: Scars
Lot #: [id number]
Include Cannula Information In Note?: No
Detail Level: Detailed
Additional Area 1 Location: Oral Commisures
Expiration Date (Month Year): 10/02/2020
Additional Area 5 Location: Earlobes
Filler: Juvederm Voluma XC
Additional Area 2 Location: Border of lips
Use Map Statement For Sites (Optional): Yes
Consent: Written consent obtained. Risks include but not limited to bruising, beading, irregular texture, ulceration, infection, allergic reaction, scar formation, incomplete augmentation, temporary nature, procedural pain.
[place] Filler Volume In Cc: 1
Filler Comments: 0.1 cc Right cutaneous upper lip\\n0.9cc of Juvederm Vollure was blended 1:2 with lidocaine no epinephrine and stranded to cheeks
Post-Care Instructions: Patient instructed to apply ice to reduce swelling.
Price (Use Numbers Only, No Special Characters Or $): 1500
Filler: Juvederm Vollure XC
Map Statment: See Attach Map for Complete Details

## 2019-10-30 ENCOUNTER — TELEPHONE (OUTPATIENT)
Dept: MEDICAL GROUP | Facility: MEDICAL CENTER | Age: 79
End: 2019-10-30

## 2019-10-30 DIAGNOSIS — F41.9 ANXIETY: ICD-10-CM

## 2019-10-30 RX ORDER — LORAZEPAM 1 MG/1
1 TABLET ORAL
Qty: 30 TAB | Refills: 5 | Status: SHIPPED
Start: 2019-10-30 | End: 2019-11-29

## 2019-10-30 NOTE — TELEPHONE ENCOUNTER
ATIVAN REFILL REQUEST.    Was the patient seen in the last year in this department? Yes    Does patient have an active prescription for medications requested? No     Received Request Via: Pharmacy

## 2019-12-03 ENCOUNTER — HOSPITAL ENCOUNTER (OUTPATIENT)
Dept: RADIOLOGY | Facility: MEDICAL CENTER | Age: 79
End: 2019-12-03
Attending: PHYSICIAN ASSISTANT
Payer: MEDICARE

## 2019-12-03 ENCOUNTER — OFFICE VISIT (OUTPATIENT)
Dept: URGENT CARE | Facility: CLINIC | Age: 79
End: 2019-12-03
Payer: MEDICARE

## 2019-12-03 ENCOUNTER — HOSPITAL ENCOUNTER (OUTPATIENT)
Dept: LAB | Facility: MEDICAL CENTER | Age: 79
End: 2019-12-03
Attending: PHYSICIAN ASSISTANT
Payer: MEDICARE

## 2019-12-03 VITALS
WEIGHT: 117 LBS | BODY MASS INDEX: 18.36 KG/M2 | TEMPERATURE: 98.1 F | RESPIRATION RATE: 16 BRPM | HEIGHT: 67 IN | OXYGEN SATURATION: 96 % | SYSTOLIC BLOOD PRESSURE: 108 MMHG | DIASTOLIC BLOOD PRESSURE: 78 MMHG | HEART RATE: 85 BPM

## 2019-12-03 DIAGNOSIS — R10.32 LEFT LOWER QUADRANT ABDOMINAL PAIN: ICD-10-CM

## 2019-12-03 DIAGNOSIS — K57.90 DIVERTICULOSIS: ICD-10-CM

## 2019-12-03 LAB
ALBUMIN SERPL BCP-MCNC: 4.6 G/DL (ref 3.2–4.9)
ALBUMIN/GLOB SERPL: 1.8 G/DL
ALP SERPL-CCNC: 55 U/L (ref 30–99)
ALT SERPL-CCNC: 36 U/L (ref 2–50)
ANION GAP SERPL CALC-SCNC: 7 MMOL/L (ref 0–11.9)
APPEARANCE UR: CLEAR
AST SERPL-CCNC: 30 U/L (ref 12–45)
BASOPHILS # BLD AUTO: 1.1 % (ref 0–1.8)
BASOPHILS # BLD: 0.07 K/UL (ref 0–0.12)
BILIRUB SERPL-MCNC: 0.4 MG/DL (ref 0.1–1.5)
BILIRUB UR STRIP-MCNC: NEGATIVE MG/DL
BUN SERPL-MCNC: 21 MG/DL (ref 8–22)
CALCIUM SERPL-MCNC: 9.8 MG/DL (ref 8.5–10.5)
CHLORIDE SERPL-SCNC: 100 MMOL/L (ref 96–112)
CO2 SERPL-SCNC: 27 MMOL/L (ref 20–33)
COLOR UR AUTO: YELLOW
CREAT SERPL-MCNC: 0.71 MG/DL (ref 0.5–1.4)
EOSINOPHIL # BLD AUTO: 0.08 K/UL (ref 0–0.51)
EOSINOPHIL NFR BLD: 1.2 % (ref 0–6.9)
ERYTHROCYTE [DISTWIDTH] IN BLOOD BY AUTOMATED COUNT: 41.8 FL (ref 35.9–50)
GLOBULIN SER CALC-MCNC: 2.6 G/DL (ref 1.9–3.5)
GLUCOSE SERPL-MCNC: 94 MG/DL (ref 65–99)
GLUCOSE UR STRIP.AUTO-MCNC: NEGATIVE MG/DL
HCT VFR BLD AUTO: 42.4 % (ref 37–47)
HGB BLD-MCNC: 14.2 G/DL (ref 12–16)
IMM GRANULOCYTES # BLD AUTO: 0.01 K/UL (ref 0–0.11)
IMM GRANULOCYTES NFR BLD AUTO: 0.2 % (ref 0–0.9)
KETONES UR STRIP.AUTO-MCNC: NORMAL MG/DL
LEUKOCYTE ESTERASE UR QL STRIP.AUTO: NEGATIVE
LYMPHOCYTES # BLD AUTO: 2.12 K/UL (ref 1–4.8)
LYMPHOCYTES NFR BLD: 32.5 % (ref 22–41)
MCH RBC QN AUTO: 32.9 PG (ref 27–33)
MCHC RBC AUTO-ENTMCNC: 33.5 G/DL (ref 33.6–35)
MCV RBC AUTO: 98.1 FL (ref 81.4–97.8)
MONOCYTES # BLD AUTO: 0.59 K/UL (ref 0–0.85)
MONOCYTES NFR BLD AUTO: 9 % (ref 0–13.4)
NEUTROPHILS # BLD AUTO: 3.65 K/UL (ref 2–7.15)
NEUTROPHILS NFR BLD: 56 % (ref 44–72)
NITRITE UR QL STRIP.AUTO: NEGATIVE
NRBC # BLD AUTO: 0 K/UL
NRBC BLD-RTO: 0 /100 WBC
PH UR STRIP.AUTO: 6.5 [PH] (ref 5–8)
PLATELET # BLD AUTO: 321 K/UL (ref 164–446)
PMV BLD AUTO: 8.9 FL (ref 9–12.9)
POTASSIUM SERPL-SCNC: 4.5 MMOL/L (ref 3.6–5.5)
PROT SERPL-MCNC: 7.2 G/DL (ref 6–8.2)
PROT UR QL STRIP: NORMAL MG/DL
RBC # BLD AUTO: 4.32 M/UL (ref 4.2–5.4)
RBC UR QL AUTO: NEGATIVE
SODIUM SERPL-SCNC: 134 MMOL/L (ref 135–145)
SP GR UR STRIP.AUTO: 1.02
UROBILINOGEN UR STRIP-MCNC: 0.2 MG/DL
WBC # BLD AUTO: 6.5 K/UL (ref 4.8–10.8)

## 2019-12-03 PROCEDURE — 700117 HCHG RX CONTRAST REV CODE 255: Performed by: PHYSICIAN ASSISTANT

## 2019-12-03 PROCEDURE — 36415 COLL VENOUS BLD VENIPUNCTURE: CPT

## 2019-12-03 PROCEDURE — 85025 COMPLETE CBC W/AUTO DIFF WBC: CPT

## 2019-12-03 PROCEDURE — 99214 OFFICE O/P EST MOD 30 MIN: CPT | Performed by: PHYSICIAN ASSISTANT

## 2019-12-03 PROCEDURE — 74177 CT ABD & PELVIS W/CONTRAST: CPT

## 2019-12-03 PROCEDURE — 81002 URINALYSIS NONAUTO W/O SCOPE: CPT | Performed by: PHYSICIAN ASSISTANT

## 2019-12-03 PROCEDURE — 80053 COMPREHEN METABOLIC PANEL: CPT

## 2019-12-03 RX ADMIN — IOHEXOL 100 ML: 350 INJECTION, SOLUTION INTRAVENOUS at 17:37

## 2019-12-03 RX ADMIN — IOHEXOL 25 ML: 240 INJECTION, SOLUTION INTRATHECAL; INTRAVASCULAR; INTRAVENOUS; ORAL at 17:37

## 2019-12-03 ASSESSMENT — ENCOUNTER SYMPTOMS
FEVER: 0
HEMATOCHEZIA: 0
ABDOMINAL PAIN: 1
MYALGIAS: 0
NAUSEA: 0
HEADACHES: 0
VOMITING: 0
SORE THROAT: 0
COUGH: 0
DIARRHEA: 1
EYE REDNESS: 0
FLANK PAIN: 0
SHORTNESS OF BREATH: 0
EYE DISCHARGE: 0
CONSTIPATION: 0

## 2019-12-03 NOTE — PROGRESS NOTES
Subjective:      Arielle Lyn is a 79 y.o. female who presents with Diverticulitis (-x5 days(hx of this on and off 20 years)) and UTI (x3 days)        Abdominal Pain   This is a new problem. Episode onset: x 5 days ago. The problem occurs intermittently. The problem has been unchanged. The pain is located in the LLQ and RLQ (left > right). The abdominal pain radiates to the back. Associated symptoms include diarrhea (The patient reports 2 episodes per day.). Pertinent negatives include no constipation, dysuria, fever, frequency, headaches, hematochezia, melena, myalgias, nausea or vomiting. The pain is aggravated by eating. The pain is relieved by nothing. She has tried nothing for the symptoms. The treatment provided mild relief.     The patient reports a history of diverticulitis.  The patient states her current symptoms are similar to her previous diverticulitis flares.  The patient states her last flare was approximately 2 years ago.  The patient denies bloody diarrhea.  The patient is requesting a prescription for Cipro and Flagyl at this time.    PMH:  has a past medical history of Diverticulosis, Hypothyroid, IBS (irritable bowel syndrome), Migraines, and Mitral valve prolapse. She also has no past medical history of Breast cancer (HCC).  MEDS:   Current Outpatient Medications:   •  FLUoxetine (PROZAC) 20 MG Cap, TAKE 1 CAPSULE BY MOUTH EVERY DAY, Disp: 90 Cap, Rfl: 3  •  maalox plus-benadryl-visc lidocaine (MAGIC MOUTHWASH), Gargle and spit 5 mL every 6 hours as needed for pain., Disp: 100 mL, Rfl: 0  •  mupirocin calcium (BACTROBAN) 2 % Cream, Apply 1 Application to affected area(s) 2 times a day., Disp: 1 Tube, Rfl: 0  •  estradiol (CLIMARA) 0.025 MG/24HR PATCH WEEKLY, APPLY 1 PATCH TO A CLEAN DRY AREA OF THE SKIN ONCE A WEEK, Disp: 12 Patch, Rfl: 3  •  levothyroxine (SYNTHROID) 150 MCG Tab, TAKE 1 TABLET BY MOUTH EVERY MORNING BEFORE BREAKFAST ON AN EMPTY STOMACH, Disp: 90 Tab, Rfl: 3  •  triamcinolone  "acetonide (KENALOG) 0.1 % Ointment, Apply 1 Application to affected area(s) 1 time daily as needed., Disp: 1 Tube, Rfl: 0  •  Aspirin-Acetaminophen-Caffeine (EXCEDRIN PO), Take  by mouth. Last dose 3 pm , Disp: , Rfl:   ALLERGIES:   Allergies   Allergen Reactions   • Macrobid [Nitrofurantoin Monohydrate Macrocrystals] Nausea     Cramp in legs     SURGHX:   Past Surgical History:   Procedure Laterality Date   • ABDOMINAL HYSTERECTOMY TOTAL      Hysterectomy, Total Abdominal   • PB ENLARGE BREAST WITH IMPLANT     • US-NEEDLE CORE BX-BREAST PANEL       SOCHX:  reports that she has never smoked. She has never used smokeless tobacco. She reports current alcohol use of about 0.6 oz of alcohol per week. She reports that she does not use drugs.  FH: Family history was reviewed, no pertinent findings to report      Review of Systems   Constitutional: Negative for fever.   HENT: Negative for congestion, ear pain and sore throat.    Eyes: Negative for discharge and redness.   Respiratory: Negative for cough and shortness of breath.    Cardiovascular: Negative for chest pain and leg swelling.   Gastrointestinal: Positive for abdominal pain and diarrhea (The patient reports 2 episodes per day.). Negative for constipation, hematochezia, melena, nausea and vomiting.   Genitourinary: Negative for dysuria, flank pain and frequency.   Musculoskeletal: Negative for myalgias.   Skin: Negative for rash.   Neurological: Negative for headaches.          Objective:     /78 (BP Location: Left arm)   Pulse 85   Temp 36.7 °C (98.1 °F) (Temporal)   Resp 16   Ht 1.702 m (5' 7\")   Wt 53.1 kg (117 lb)   SpO2 96%   BMI 18.32 kg/m²      Physical Exam  Constitutional:       General: She is not in acute distress.     Appearance: Normal appearance.   HENT:      Head: Normocephalic and atraumatic.      Right Ear: External ear normal.      Left Ear: External ear normal.      Nose: Nose normal.      Mouth/Throat:      Mouth: Mucous membranes " are moist.      Pharynx: Oropharynx is clear. No posterior oropharyngeal erythema.   Eyes:      Extraocular Movements: Extraocular movements intact.      Conjunctiva/sclera: Conjunctivae normal.   Neck:      Musculoskeletal: Normal range of motion and neck supple.   Cardiovascular:      Rate and Rhythm: Normal rate and regular rhythm.      Heart sounds: Normal heart sounds.   Pulmonary:      Effort: Pulmonary effort is normal. No respiratory distress.      Breath sounds: Normal breath sounds. No wheezing.   Abdominal:      General: Bowel sounds are normal.      Palpations: Abdomen is soft.      Tenderness: There is tenderness in the right lower quadrant, suprapubic area and left lower quadrant. There is no right CVA tenderness, left CVA tenderness, guarding or rebound.      Comments:   Diffuse lower abdominal tenderness.   Musculoskeletal: Normal range of motion.   Skin:     General: Skin is warm and dry.   Neurological:      Mental Status: She is alert and oriented to person, place, and time.           Progress:  Results for orders placed or performed in visit on 12/03/19   POCT Urinalysis   Result Value Ref Range    POC Color yellow Negative    POC Appearance clear Negative    POC Leukocyte Esterase negative Negative    POC Nitrites negative Negative    POC Urobiligen 0.2 Negative (0.2) mg/dL    POC Protein trace Negative mg/dL    POC Urine PH 6.5 5.0 - 8.0    POC Blood negative Negative    POC Specific Gravity 1.025 <1.005 - >1.030    POC Ketones trace Negative mg/dL    POC Bilirubin negative Negative mg/dL    POC Glucose negative Negative mg/dL     CBC:    Ref Range & Units 10:04 AM     WBC 4.8 - 10.8 K/uL 6.5      RBC 4.20 - 5.40 M/uL 4.32      Hemoglobin 12.0 - 16.0 g/dL 14.2      Hematocrit 37.0 - 47.0 % 42.4      MCV 81.4 - 97.8 fL 98.1High       MCH 27.0 - 33.0 pg 32.9      MCHC 33.6 - 35.0 g/dL 33.5Low       RDW 35.9 - 50.0 fL 41.8      Platelet Count 164 - 446 K/uL 321      MPV 9.0 - 12.9 fL 8.9Low        Neutrophils-Polys 44.00 - 72.00 % 56.00      Lymphocytes 22.00 - 41.00 % 32.50      Monocytes 0.00 - 13.40 % 9.00      Eosinophils 0.00 - 6.90 % 1.20      Basophils 0.00 - 1.80 % 1.10      Immature Granulocytes 0.00 - 0.90 % 0.20      Nucleated RBC /100 WBC 0.00      Neutrophils (Absolute) 2.00 - 7.15 K/uL 3.65      Comment: Includes immature neutrophils, if present.   Lymphs (Absolute) 1.00 - 4.80 K/uL 2.12      Monos (Absolute) 0.00 - 0.85 K/uL 0.59      Eos (Absolute) 0.00 - 0.51 K/uL 0.08      Baso (Absolute) 0.00 - 0.12 K/uL 0.07      Immature Granulocytes (abs) 0.00 - 0.11 K/uL 0.01      NRBC (Absolute) K/uL 0.00        CMP :   Ref Range & Units 10:04 AM     Sodium 135 - 145 mmol/L 134Low       Potassium 3.6 - 5.5 mmol/L 4.5      Chloride 96 - 112 mmol/L 100      Co2 20 - 33 mmol/L 27      Anion Gap 0.0 - 11.9 7.0      Glucose 65 - 99 mg/dL 94      Bun 8 - 22 mg/dL 21      Creatinine 0.50 - 1.40 mg/dL 0.71      Calcium 8.5 - 10.5 mg/dL 9.8      AST(SGOT) 12 - 45 U/L 30      ALT(SGPT) 2 - 50 U/L 36      Alkaline Phosphatase 30 - 99 U/L 55      Total Bilirubin 0.1 - 1.5 mg/dL 0.4      Albumin 3.2 - 4.9 g/dL 4.6      Total Protein 6.0 - 8.2 g/dL 7.2      Globulin 1.9 - 3.5 g/dL 2.6      A-G Ratio g/dL 1.8        CT Abdomen/Pelvis with Contrast:  FINDINGS:  Visualized lung bases demonstrate a 6 mm peripheral right lower lobe lung nodule on the superiormost images. There are subpleural reticular opacities on the left likely areas of chronic scarring. There are bilateral breast implants.     There is no acute bony process. There is multilevel degenerative change throughout the spine.     CT Abdomen:     The liver is unremarkable.     The spleen is unremarkable.     The pancreas is unremarkable.     The gallbladder demonstrates no stones.     The adrenal glands are normal in size.     The kidneys enhance symmetrically.     The abdominal aorta is normal in caliber with atherosclerosis.     There is no  lymphadenopathy.     The bowel demonstrates some contrast material and food debris in the stomach. There is stool throughout the colon. There is no bowel obstruction.     There is no free fluid or free air in the abdomen.     CT Pelvis:  There is no acute inflammatory process in the pelvis.     There is sigmoid diverticulosis but there is no definite evidence of diverticulitis.     There are bilateral injection granulomas in the subcutaneous fat of the tibial regions.     IMPRESSION:     1.  There is no acute inflammatory process within the abdomen or pelvis.  2.  There is sigmoid diverticulosis but no evidence of diverticulitis.  3.  There is a nonspecific 6 mm peripheral right lower lobe lung nodule. Follow-up per Fleischner guidelines noted below.  4.  There are areas of scarring in the left lower lobe.    Spoke with the patient regarding the results of her lab tests and CT scan.     Assessment/Plan:     1. Left lower quadrant abdominal pain  - POCT Urinalysis  - CT-ABDOMEN-PELVIS WITH; Future  - CBC WITH DIFFERENTIAL; Future  - Comp Metabolic Panel; Future    2. Diverticulosis    The patient's presenting symptoms and physical exam are consistent with left lower quadrant abdominal pain.  On physical exam, the patient had diffuse tenderness to her lower abdomen without guarding or rebound.  The patient's bowel sounds were within normal limits.  The patient's urinalysis today in clinic was normal without signs of infection.  The patient reports a history of diverticulitis.  Will order a CBC and CMP, as well as a CT abdomen pelvis to further evaluate the patient's symptoms.  The patient's CBC and CMP were within normal limits.  The patient did not have an elevated WBC.  The patient's CT abdomen pelvis with contrast showed no acute inflammatory process within the abdomen or pelvis.  The radiologist noted sigmoid diverticulosis, without evidence of diverticulitis.  Based on the patient's lab results and CT findings,  is unlikely the patient symptoms are due to acute diverticulitis.  Discussed likely viral etiology as a cause of the patient's lower abdominal pain with intermittent diarrhea.  Informed the patient prescribing Cipro and Flagyl at this time was not indicated, and would likely worsen her associated diarrhea.  Informed the patient of the incidental finding of a nonspecific 6 mm peripheral right lower lobe lung nodule.  Advised the patient to inform her PCP of this incidental finding.  Recommend OTC medications and supportive care for symptomatic management.  Recommend the patient follow-up with her PCP as scheduled.  Discussed return precautions with the patient, and she verbalized understanding.    Differential diagnoses, supportive care, and indications for immediate follow-up discussed with patient.   Instructed to return to clinic or nearest emergency department for any change in condition, further concerns, or worsening of symptoms.    OTC Tylenol or Motrin for fever/discomfort.  Drink plenty of fluids  Juana Diaz Diet  -- Avoid seeds/nuts which may cause diverticulitis given her current diverticulosis.   Follow-up with PCP as scheduled  Return to clinic or go to the ED if symptoms worsen or fail to improve, or if patient should develop worsening/increasing/persistent abdominal pain, nausea/vomiting, diarrhea, bloody stools, urinary symptoms, fever/chills, and/or any concerning symptoms.    Discussed plan with the patient, and she agrees to the above.

## 2019-12-05 ENCOUNTER — OFFICE VISIT (OUTPATIENT)
Dept: MEDICAL GROUP | Facility: MEDICAL CENTER | Age: 79
End: 2019-12-05
Payer: MEDICARE

## 2019-12-05 VITALS
TEMPERATURE: 97.9 F | WEIGHT: 117 LBS | OXYGEN SATURATION: 98 % | HEIGHT: 67 IN | HEART RATE: 109 BPM | BODY MASS INDEX: 18.36 KG/M2 | SYSTOLIC BLOOD PRESSURE: 124 MMHG | DIASTOLIC BLOOD PRESSURE: 68 MMHG

## 2019-12-05 DIAGNOSIS — R91.1 PULMONARY NODULE: ICD-10-CM

## 2019-12-05 DIAGNOSIS — K57.92 DIVERTICULITIS: ICD-10-CM

## 2019-12-05 PROBLEM — L08.9 WOUND INFECTION: Status: RESOLVED | Noted: 2019-05-28 | Resolved: 2019-12-05

## 2019-12-05 PROBLEM — T14.8XXA WOUND INFECTION: Status: RESOLVED | Noted: 2019-05-28 | Resolved: 2019-12-05

## 2019-12-05 PROCEDURE — 99214 OFFICE O/P EST MOD 30 MIN: CPT | Performed by: FAMILY MEDICINE

## 2019-12-05 RX ORDER — CIPROFLOXACIN 500 MG/1
500 TABLET, FILM COATED ORAL 2 TIMES DAILY
Qty: 14 TAB | Refills: 0 | Status: SHIPPED | OUTPATIENT
Start: 2019-12-05 | End: 2019-12-12

## 2019-12-05 RX ORDER — METRONIDAZOLE 500 MG/1
500 TABLET ORAL 3 TIMES DAILY
Qty: 21 TAB | Refills: 0 | Status: SHIPPED | OUTPATIENT
Start: 2019-12-05 | End: 2019-12-12

## 2019-12-05 NOTE — ASSESSMENT & PLAN NOTE
Patient has had multiple recurrent episodes of diverticulitis with significant left lower quadrant pain.  For many years she has had a prescription on hand for Cipro.  Patient came down with recurrent symptoms last week and self treated with a few days of Cipro and Flagyl, which resolved her symptoms.  She schedule appointment at urgent care and kept the appointment.  A CT scan was performed and there showed no acute diverticulitis at that time.  She is requesting a refill for Cipro and Flagyl.

## 2019-12-05 NOTE — ASSESSMENT & PLAN NOTE
Patient had a CT scan of abdomen and pelvis and a right lower lobe 6 mm nodule was identified.  She has no history of smoking or severe lung disease.  She has not had a CT scan of her lungs previously.

## 2019-12-05 NOTE — PROGRESS NOTES
Subjective:   Afsaneh Lyn is a 79 y.o. female here today for diverticulitis and pulmonary nodule    Diverticulitis  Patient has had multiple recurrent episodes of diverticulitis with significant left lower quadrant pain.  For many years she has had a prescription on hand for Cipro.  Patient came down with recurrent symptoms last week and self treated with a few days of Cipro and Flagyl, which resolved her symptoms.  She schedule appointment at urgent care and kept the appointment.  A CT scan was performed and there showed no acute diverticulitis at that time.  She is requesting a refill for Cipro and Flagyl.    Pulmonary nodule  Patient had a CT scan of abdomen and pelvis and a right lower lobe 6 mm nodule was identified.  She has no history of smoking or severe lung disease.  She has not had a CT scan of her lungs previously.         Current medicines (including changes today)  Current Outpatient Medications   Medication Sig Dispense Refill   • ciprofloxacin (CIPRO) 500 MG Tab Take 1 Tab by mouth 2 times a day for 7 days. 14 Tab 0   • metroNIDAZOLE (FLAGYL) 500 MG Tab Take 1 Tab by mouth 3 times a day for 7 days. 21 Tab 0   • FLUoxetine (PROZAC) 20 MG Cap TAKE 1 CAPSULE BY MOUTH EVERY DAY 90 Cap 3   • maalox plus-benadryl-visc lidocaine (MAGIC MOUTHWASH) Gargle and spit 5 mL every 6 hours as needed for pain. 100 mL 0   • mupirocin calcium (BACTROBAN) 2 % Cream Apply 1 Application to affected area(s) 2 times a day. 1 Tube 0   • estradiol (CLIMARA) 0.025 MG/24HR PATCH WEEKLY APPLY 1 PATCH TO A CLEAN DRY AREA OF THE SKIN ONCE A WEEK 12 Patch 3   • levothyroxine (SYNTHROID) 150 MCG Tab TAKE 1 TABLET BY MOUTH EVERY MORNING BEFORE BREAKFAST ON AN EMPTY STOMACH 90 Tab 3   • triamcinolone acetonide (KENALOG) 0.1 % Ointment Apply 1 Application to affected area(s) 1 time daily as needed. 1 Tube 0   • Aspirin-Acetaminophen-Caffeine (EXCEDRIN PO) Take  by mouth. Last dose 3 pm        No current facility-administered  "medications for this visit.      She  has a past medical history of Diverticulosis, Hypothyroid, IBS (irritable bowel syndrome), Migraines, and Mitral valve prolapse. She also has no past medical history of Breast cancer (HCC).    ROS   No fever, no dysuria       Objective:     /68 (BP Location: Right arm, Patient Position: Sitting)   Pulse (!) 109   Temp 36.6 °C (97.9 °F)   Ht 1.702 m (5' 7\")   Wt 53.1 kg (117 lb)   SpO2 98%  Body mass index is 18.32 kg/m².   Physical Exam:  Constitutional: Alert, no distress.  Skin: Warm, dry, good turgor, no rashes in visible areas.  Eye: Equal, round and reactive, conjunctiva clear, lids normal.  Psych: Alert and oriented x3, normal affect and mood.        Assessment and Plan:   The following treatment plan was discussed    1. Diverticulitis  New problem.  Prescription for Cipro and Flagyl to be used next time she gets a flareup.  - ciprofloxacin (CIPRO) 500 MG Tab; Take 1 Tab by mouth 2 times a day for 7 days.  Dispense: 14 Tab; Refill: 0  - metroNIDAZOLE (FLAGYL) 500 MG Tab; Take 1 Tab by mouth 3 times a day for 7 days.  Dispense: 21 Tab; Refill: 0    2. Pulmonary nodule  New problem.  CT scan of chest ordered to follow-up pulmonary nodule.  If no significant nodules identified consider 12 months and 24-month follow-up.  - CT-CHEST (THORAX) W/O; Future      Followup: Return if symptoms worsen or fail to improve.         "

## 2019-12-17 ENCOUNTER — APPOINTMENT (RX ONLY)
Dept: URBAN - METROPOLITAN AREA CLINIC 35 | Facility: CLINIC | Age: 79
Setting detail: DERMATOLOGY
End: 2019-12-17

## 2019-12-17 DIAGNOSIS — Z85.828 PERSONAL HISTORY OF OTHER MALIGNANT NEOPLASM OF SKIN: ICD-10-CM

## 2019-12-17 DIAGNOSIS — Z71.89 OTHER SPECIFIED COUNSELING: ICD-10-CM

## 2019-12-17 DIAGNOSIS — L57.0 ACTINIC KERATOSIS: ICD-10-CM

## 2019-12-17 DIAGNOSIS — L81.8 OTHER SPECIFIED DISORDERS OF PIGMENTATION: ICD-10-CM

## 2019-12-17 DIAGNOSIS — L82.1 OTHER SEBORRHEIC KERATOSIS: ICD-10-CM

## 2019-12-17 DIAGNOSIS — L81.4 OTHER MELANIN HYPERPIGMENTATION: ICD-10-CM

## 2019-12-17 PROCEDURE — ? LIQUID NITROGEN

## 2019-12-17 PROCEDURE — ? COUNSELING

## 2019-12-17 PROCEDURE — 17003 DESTRUCT PREMALG LES 2-14: CPT

## 2019-12-17 PROCEDURE — 99214 OFFICE O/P EST MOD 30 MIN: CPT | Mod: 25

## 2019-12-17 PROCEDURE — 17000 DESTRUCT PREMALG LESION: CPT

## 2019-12-17 ASSESSMENT — LOCATION DETAILED DESCRIPTION DERM
LOCATION DETAILED: EPIGASTRIC SKIN
LOCATION DETAILED: RIGHT CENTRAL MALAR CHEEK
LOCATION DETAILED: RIGHT LATERAL SUPERIOR CHEST
LOCATION DETAILED: RIGHT MEDIAL DISTAL PRETIBIAL REGION
LOCATION DETAILED: LEFT POSTERIOR SHOULDER
LOCATION DETAILED: LEFT PROXIMAL DORSAL FOREARM
LOCATION DETAILED: RIGHT PROXIMAL LATERAL POSTERIOR UPPER ARM
LOCATION DETAILED: NASAL DORSUM
LOCATION DETAILED: RIGHT CENTRAL EYEBROW
LOCATION DETAILED: NASAL ROOT
LOCATION DETAILED: LEFT SUPERIOR UPPER BACK
LOCATION DETAILED: RIGHT PROXIMAL DORSAL FOREARM
LOCATION DETAILED: RIGHT INFERIOR MEDIAL FOREHEAD
LOCATION DETAILED: LEFT MEDIAL DISTAL PRETIBIAL REGION
LOCATION DETAILED: RIGHT SUPERIOR FOREHEAD
LOCATION DETAILED: LEFT MEDIAL MALAR CHEEK
LOCATION DETAILED: RIGHT LATERAL BREAST 6-7:00 REGION
LOCATION DETAILED: RIGHT PROXIMAL PRETIBIAL REGION

## 2019-12-17 ASSESSMENT — LOCATION SIMPLE DESCRIPTION DERM
LOCATION SIMPLE: NOSE
LOCATION SIMPLE: LEFT PRETIBIAL REGION
LOCATION SIMPLE: LEFT SHOULDER
LOCATION SIMPLE: RIGHT POSTERIOR UPPER ARM
LOCATION SIMPLE: RIGHT FOREHEAD
LOCATION SIMPLE: ABDOMEN
LOCATION SIMPLE: RIGHT EYEBROW
LOCATION SIMPLE: LEFT CHEEK
LOCATION SIMPLE: RIGHT PRETIBIAL REGION
LOCATION SIMPLE: RIGHT BREAST
LOCATION SIMPLE: RIGHT CHEEK
LOCATION SIMPLE: RIGHT FOREARM
LOCATION SIMPLE: LEFT UPPER BACK
LOCATION SIMPLE: CHEST
LOCATION SIMPLE: LEFT FOREARM

## 2019-12-17 ASSESSMENT — LOCATION ZONE DERM
LOCATION ZONE: TRUNK
LOCATION ZONE: ARM
LOCATION ZONE: NOSE
LOCATION ZONE: LEG
LOCATION ZONE: FACE

## 2019-12-17 NOTE — PROCEDURE: LIQUID NITROGEN
Post-Care Instructions: I reviewed with the patient in detail post-care instructions. Patient is to wear sunprotection, and avoid picking at any of the treated lesions. Pt may apply Vaseline to crusted or scabbing areas.
Render Post-Care Instructions In Note?: no
Number Of Freeze-Thaw Cycles: 2 freeze-thaw cycles
Detail Level: Detailed
Consent: The patient's consent was obtained including but not limited to risks of crusting, scabbing, blistering, scarring, darker or lighter pigmentary change, recurrence, incomplete removal and infection.
Duration Of Freeze Thaw-Cycle (Seconds): 2

## 2019-12-19 ENCOUNTER — APPOINTMENT (RX ONLY)
Dept: URBAN - METROPOLITAN AREA CLINIC 35 | Facility: CLINIC | Age: 79
Setting detail: DERMATOLOGY
End: 2019-12-19

## 2019-12-19 DIAGNOSIS — Z41.9 ENCOUNTER FOR PROCEDURE FOR PURPOSES OTHER THAN REMEDYING HEALTH STATE, UNSPECIFIED: ICD-10-CM

## 2019-12-19 PROCEDURE — ? FILLERS

## 2019-12-19 NOTE — PROCEDURE: FILLERS
Additional Area 3 Volume In Cc: 0
Include Cannula Information In Note?: No
Tear Troughs Filler Volume In Cc: 0.4
Additional Area 4 Location: Scars
Expiration Date (Month Year): 08/11/20
Filler: Juvederm Ultra XC
Filler Comments: 1 cc of Juvederm Ultra XC was blended 1:1 with lidocaine no epinephrine and stranded to right cheek
Additional Area 1 Location: Oral Commisures
Additional Area 5 Location: Earlobes
Detail Level: Detailed
Additional Area 3 Location: Fine lines around mouth
Use Map Statement For Sites (Optional): Yes
Consent: Written consent obtained. Risks include but not limited to bruising, beading, irregular texture, ulceration, infection, allergic reaction, scar formation, incomplete augmentation, temporary nature, procedural pain.
Lot #: [id number]
Additional Area 2 Location: Border of lips
Post-Care Instructions: Patient instructed to apply ice to reduce swelling.
Vermilion Lips Filler Volume In Cc: 0.6
Filler: Juvederm Volbella XC
Price (Use Numbers Only, No Special Characters Or $): 6332
Lot #: [id number]
Additional Area 5 Volume In Cc: 0.2
Map Statment: See Attach Map for Complete Details
Expiration Date (Month Year): 08/12/20
Additional Area 3 Volume In Cc: 0.1

## 2019-12-28 ENCOUNTER — HOSPITAL ENCOUNTER (OUTPATIENT)
Dept: RADIOLOGY | Facility: MEDICAL CENTER | Age: 79
End: 2019-12-28
Attending: FAMILY MEDICINE
Payer: MEDICARE

## 2019-12-28 DIAGNOSIS — R91.1 PULMONARY NODULE: ICD-10-CM

## 2019-12-28 PROCEDURE — 71250 CT THORAX DX C-: CPT

## 2019-12-30 ENCOUNTER — TELEPHONE (OUTPATIENT)
Dept: MEDICAL GROUP | Facility: MEDICAL CENTER | Age: 79
End: 2019-12-30

## 2019-12-30 DIAGNOSIS — R91.1 PULMONARY NODULE: ICD-10-CM

## 2019-12-30 PROBLEM — I71.40 ABDOMINAL AORTIC ANEURYSM (AAA) WITHOUT RUPTURE (HCC): Status: ACTIVE | Noted: 2019-12-30

## 2019-12-30 NOTE — TELEPHONE ENCOUNTER
Pt notified   Pt would like repeat Ct scan ordered and she would also like to know how many nodules and their sizes.

## 2019-12-30 NOTE — TELEPHONE ENCOUNTER
----- Message from Flavio Cooper M.D. sent at 12/30/2019 12:21 PM PST -----  Please notify patient that she has several small lung nodules and we should do a repeat CT scan in 3 months to see if any of them are changing.  Please let me know if she is okay with me ordering a CT scan.  Flavio Cooper M.D.

## 2019-12-31 ENCOUNTER — TELEPHONE (OUTPATIENT)
Dept: MEDICAL GROUP | Facility: MEDICAL CENTER | Age: 79
End: 2019-12-31

## 2019-12-31 DIAGNOSIS — R91.1 PULMONARY NODULE: ICD-10-CM

## 2019-12-31 NOTE — TELEPHONE ENCOUNTER
Pt called and said she wants to see a specialist for her nodules. She has made an appt with [facility] on 1/8/20 and wants her medical records to be faxed over including her CAT scan.     I left a vm for her to call back because she needs to call the doc office she made an appt at and tell them to call [phone] our medical records office so they can obtain the records they need.

## 2020-01-02 NOTE — TELEPHONE ENCOUNTER
Spoke to patient directly.  We will switch the referral to pulmonology.  She is quite concerned and nervous about the findings because she has no history of smoking, no history of pneumonia or significant lung disease.  Urgent pulmonology referral done.  Flavio Cooper M.D.

## 2020-01-29 ENCOUNTER — APPOINTMENT (RX ONLY)
Dept: URBAN - METROPOLITAN AREA CLINIC 35 | Facility: CLINIC | Age: 80
Setting detail: DERMATOLOGY
End: 2020-01-29

## 2020-01-29 ENCOUNTER — TELEPHONE (OUTPATIENT)
Dept: MEDICAL GROUP | Facility: MEDICAL CENTER | Age: 80
End: 2020-01-29

## 2020-01-29 DIAGNOSIS — R91.1 PULMONARY NODULE: ICD-10-CM

## 2020-01-29 DIAGNOSIS — Z41.9 ENCOUNTER FOR PROCEDURE FOR PURPOSES OTHER THAN REMEDYING HEALTH STATE, UNSPECIFIED: ICD-10-CM

## 2020-01-29 PROCEDURE — ? BOTOX

## 2020-01-29 PROCEDURE — ? FILLERS

## 2020-01-29 NOTE — PROCEDURE: FILLERS
Map Statment: See Attach Map for Complete Details
Additional Area 1 Location: Oral Commisures
Lateral Face Filler Volume In Cc: 0
Price (Use Numbers Only, No Special Characters Or $): 400
Additional Area 4 Location: Scars
Additional Area 3 Location: Fine lines around mouth
Include Cannula Information In Note?: No
Additional Area 5 Location: Earlobes
Expiration Date (Month Year): 10/22/20
Additional Area 2 Location: Border of lips
Additional Area 1 Volume In Cc: 0.2
Post-Care Instructions: Patient instructed to apply ice to reduce swelling.
Lot #: [id number]
Consent: Written consent obtained. Risks include but not limited to bruising, beading, irregular texture, ulceration, infection, allergic reaction, scar formation, incomplete augmentation, temporary nature, procedural pain.
Filler: Juvederm Ultra XC
Detail Level: Detailed
Use Map Statement For Sites (Optional): Yes

## 2020-01-29 NOTE — PROCEDURE: BOTOX
Additional Area 6 Location: platysma
Depressor Anguli Oris Units: 0
Consent: Verbal and written informed consent were obtained to include the following risks: pain, swelling, bruising, eyelid or eyebrow droop, and lack of visible improvement of wrinkles in the areas treated.  The skin was cleansed with alcohol. Injections were administered with a 32g needle into the following areas:
Additional Area 4 Location: Bunny lines
Additional Area 2 Location: [place]
Additional Area 3 Location: [place]
Price (Use Numbers Only, No Special Characters Or $): 491
Additional Area 5 Location: perioral
Additional Area 1 Location: [place]
Reconstitution Date (Optional): 1/29/20
Additional Area 1 Units: 16
Expiration Date (Month Year): 05/22
Post-Care Instructions: Patient instructed to not lie down for 4 hours after injections and limit physical activity for 24 hours.
Detail Level: Detailed
Lot #: [id number]
Dilution (U/0.1 Cc): 5
Additional Area 2 Units: 34

## 2020-01-29 NOTE — TELEPHONE ENCOUNTER
Patient was unable to get scheduled with Pulmonology until 2/24 with Dr. Wakefield. Patient is anxious about lung nodules and is requesting referral be marked urgent so she can be seen sooner.

## 2020-02-14 ENCOUNTER — OFFICE VISIT (OUTPATIENT)
Dept: MEDICAL GROUP | Facility: MEDICAL CENTER | Age: 80
End: 2020-02-14
Payer: MEDICARE

## 2020-02-14 VITALS
DIASTOLIC BLOOD PRESSURE: 78 MMHG | SYSTOLIC BLOOD PRESSURE: 120 MMHG | WEIGHT: 121 LBS | OXYGEN SATURATION: 98 % | HEIGHT: 67 IN | BODY MASS INDEX: 18.99 KG/M2 | TEMPERATURE: 98.2 F | HEART RATE: 84 BPM

## 2020-02-14 DIAGNOSIS — J01.00 ACUTE NON-RECURRENT MAXILLARY SINUSITIS: ICD-10-CM

## 2020-02-14 DIAGNOSIS — H61.22 IMPACTED CERUMEN OF LEFT EAR: ICD-10-CM

## 2020-02-14 PROCEDURE — 99214 OFFICE O/P EST MOD 30 MIN: CPT | Performed by: FAMILY MEDICINE

## 2020-02-14 RX ORDER — FLUTICASONE PROPIONATE 50 MCG
1 SPRAY, SUSPENSION (ML) NASAL 2 TIMES DAILY
Qty: 1 BOTTLE | Refills: 5 | Status: SHIPPED
Start: 2020-02-14 | End: 2020-08-14

## 2020-02-14 RX ORDER — AMOXICILLIN 875 MG/1
875 TABLET, COATED ORAL 2 TIMES DAILY
Qty: 14 TAB | Refills: 0 | Status: SHIPPED | OUTPATIENT
Start: 2020-02-14 | End: 2020-02-21

## 2020-02-14 NOTE — PROGRESS NOTES
Subjective:   Afsaneh Lyn is a 79 y.o. female here today for sore throat    Sore throat for the last 3-4 weeks. Her nose is also running constantly. No history of allergies. She took a Z-paulie and every symptom improved, but returned as soon as she finished. + sinus pressure, no radiation into teeth. Ears feel like they need to pop.    Current medicines (including changes today)  Current Outpatient Medications   Medication Sig Dispense Refill   • amoxicillin (AMOXIL) 875 MG tablet Take 1 Tab by mouth 2 times a day for 7 days. 14 Tab 0   • fluticasone (FLONASE) 50 MCG/ACT nasal spray Spray 1 Spray in nose 2 times a day. 1 Bottle 5   • LORazepam (ATIVAN) 1 MG Tab Take 1 Tab by mouth 1 time daily as needed for Anxiety for up to 30 days. 30 Tab 5   • FLUoxetine (PROZAC) 20 MG Cap TAKE 1 CAPSULE BY MOUTH EVERY DAY 90 Cap 3   • maalox plus-benadryl-visc lidocaine (MAGIC MOUTHWASH) Gargle and spit 5 mL every 6 hours as needed for pain. 100 mL 0   • mupirocin calcium (BACTROBAN) 2 % Cream Apply 1 Application to affected area(s) 2 times a day. 1 Tube 0   • estradiol (CLIMARA) 0.025 MG/24HR PATCH WEEKLY APPLY 1 PATCH TO A CLEAN DRY AREA OF THE SKIN ONCE A WEEK 12 Patch 3   • levothyroxine (SYNTHROID) 150 MCG Tab TAKE 1 TABLET BY MOUTH EVERY MORNING BEFORE BREAKFAST ON AN EMPTY STOMACH 90 Tab 3   • triamcinolone acetonide (KENALOG) 0.1 % Ointment Apply 1 Application to affected area(s) 1 time daily as needed. 1 Tube 0   • Aspirin-Acetaminophen-Caffeine (EXCEDRIN PO) Take  by mouth. Last dose 3 pm        No current facility-administered medications for this visit.      She  has a past medical history of Diverticulosis, Hypothyroid, IBS (irritable bowel syndrome), Migraines, and Mitral valve prolapse. She also has no past medical history of Breast cancer (HCC).    ROS   No fever, no cough, + bilateral ear pressure       Objective:     /78 (BP Location: Right arm, Patient Position: Sitting)   Pulse 84   Temp 36.8  "°C (98.2 °F)   Ht 1.702 m (5' 7\")   Wt 54.9 kg (121 lb)   SpO2 98%  Body mass index is 18.95 kg/m².   Physical Exam:  Constitutional: Alert, no distress.  Skin: Warm, dry, good turgor, no rashes in visible areas.  Eye: Equal, round and reactive, conjunctiva clear, lids normal.  ENMT: Lips without lesions, good dentition, oropharynx clear. Bilateral cerumen impaction, left much worse than right.  Thick nasal drainage in left nasal passage.  Positive left-sided sinus tenderness on palpation.  Neck: Trachea midline, no masses, no thyromegaly. No cervical or supraclavicular lymphadenopathy  Psych: Alert and oriented x3, normal affect and mood.        Assessment and Plan:   The following treatment plan was discussed    1. Acute non-recurrent maxillary sinusitis  New problem.  Pharyngitis is most likely related to postnasal drainage.  Prescription for amoxicillin and Flonase.  Advised patient to use Flonase for more than a week if symptoms do not improve.  - amoxicillin (AMOXIL) 875 MG tablet; Take 1 Tab by mouth 2 times a day for 7 days.  Dispense: 14 Tab; Refill: 0  - fluticasone (FLONASE) 50 MCG/ACT nasal spray; Spray 1 Spray in nose 2 times a day.  Dispense: 1 Bottle; Refill: 5    2. Impacted cerumen of left ear  Lavaged in clinic today.      Followup: Return if symptoms worsen or fail to improve.         "

## 2020-02-24 ENCOUNTER — OFFICE VISIT (OUTPATIENT)
Dept: PULMONOLOGY | Facility: HOSPICE | Age: 80
End: 2020-02-24
Payer: MEDICARE

## 2020-02-24 VITALS
HEIGHT: 67 IN | SYSTOLIC BLOOD PRESSURE: 126 MMHG | DIASTOLIC BLOOD PRESSURE: 80 MMHG | OXYGEN SATURATION: 96 % | HEART RATE: 72 BPM | BODY MASS INDEX: 18.83 KG/M2 | RESPIRATION RATE: 14 BRPM | TEMPERATURE: 97.9 F | WEIGHT: 120 LBS

## 2020-02-24 DIAGNOSIS — I71.40 ABDOMINAL AORTIC ANEURYSM (AAA) WITHOUT RUPTURE (HCC): ICD-10-CM

## 2020-02-24 DIAGNOSIS — R91.8 MULTIPLE PULMONARY NODULES: ICD-10-CM

## 2020-02-24 PROCEDURE — 99204 OFFICE O/P NEW MOD 45 MIN: CPT | Performed by: INTERNAL MEDICINE

## 2020-02-24 ASSESSMENT — PAIN SCALES - GENERAL: PAINLEVEL: NO PAIN

## 2020-02-25 PROBLEM — R91.1 PULMONARY NODULE: Status: RESOLVED | Noted: 2019-12-05 | Resolved: 2020-02-25

## 2020-02-25 PROBLEM — R91.8 MULTIPLE PULMONARY NODULES: Status: ACTIVE | Noted: 2020-02-25

## 2020-02-25 ASSESSMENT — ENCOUNTER SYMPTOMS
SHORTNESS OF BREATH: 0
WEIGHT LOSS: 0
CHILLS: 0
EYE DISCHARGE: 0
ORTHOPNEA: 0
FEVER: 0
SENSORY CHANGE: 0
EYE PAIN: 0
WHEEZING: 0
PSYCHIATRIC NEGATIVE: 1
COUGH: 0
NAUSEA: 0
FOCAL WEAKNESS: 0
SPUTUM PRODUCTION: 0
SINUS PAIN: 0
ABDOMINAL PAIN: 0
DIZZINESS: 0
LOSS OF CONSCIOUSNESS: 0
HEADACHES: 0
MYALGIAS: 0
STRIDOR: 0
DIARRHEA: 0
VOMITING: 0
SORE THROAT: 0

## 2020-02-25 NOTE — PROGRESS NOTES
I did not personally see the patient.  I reviewed and agree with the assessment and plan as documented in this note. Johanne Ansari, PhD, LP       Pulmonary Clinic- Initial Consult    Date of Service: 2/24/2020    Referring Physician: Flavio Cooper M.D.    Reason for Consult: Multiple pulmonary nodules    Chief Complaint:   Chief Complaint   Patient presents with   • New Patient     Pulmonary Nodule       HPI:   Afsaneh Lyn is a very pleasant 79 y.o. female never tobacco smoker who is followed by Dr Trivedi and is referred to the pulmonary clinic for incidental pulmonary nodules. Patient has not been seen by pulmonary in the past.    Arielle has a history of diverticulitis.  She recently was hospitalized for complications due to diverticulitis and a CT abdomen pelvis was obtained showing small subcentimeter bilateral pulmonary nodules in the lower lobes.  A subsequent CT chest was performed on 12/28/2019 showing several nodules largest 6.7 mm.  Also noted was a 4.4 cm aortic aneurysm.    Patient born and raised in [place]  Has worked in the [employer] industry  States she spent 2 to 3 years resurfacing and refinishing furniture in her garage otherwise no significant dust/solvents/toxins exposures.  No asbestos exposure  Had a childhood illness that was undiagnosed for 1 summer, otherwise no history of pneumonias or hospitalizations for respiratory issues no history of asthma  No history of valley fever    Functionally very active with no limitations.  She denies any cough, shortness of breath, or unintentional weight loss.    Accompanied by her daughter at visit today    Past Medical History:   Diagnosis Date   • Chickenpox    • Diverticulosis    • Bulgarian measles    • Hypothyroid    • IBS (irritable bowel syndrome)    • Influenza    • Migraines    • Mitral valve prolapse    • Mumps        Past Surgical History:   Procedure Laterality Date   • ABDOMINAL HYSTERECTOMY TOTAL      Hysterectomy, Total Abdominal   • PB ENLARGE BREAST WITH IMPLANT     • US-NEEDLE CORE BX-BREAST PANEL         Social History     Socioeconomic History   • Marital  status: Single     Spouse name: Not on file   • Number of children: Not on file   • Years of education: Not on file   • Highest education level: Not on file   Occupational History   • Not on file   Social Needs   • Financial resource strain: Not on file   • Food insecurity     Worry: Not on file     Inability: Not on file   • Transportation needs     Medical: Not on file     Non-medical: Not on file   Tobacco Use   • Smoking status: Never Smoker   • Smokeless tobacco: Never Used   Substance and Sexual Activity   • Alcohol use: Yes     Alcohol/week: 0.6 oz     Types: 1 Glasses of wine per week     Comment: wine daily   • Drug use: No   • Sexual activity: Not on file     Comment: , two daughters,    Lifestyle   • Physical activity     Days per week: Not on file     Minutes per session: Not on file   • Stress: Not on file   Relationships   • Social connections     Talks on phone: Not on file     Gets together: Not on file     Attends Sabianist service: Not on file     Active member of club or organization: Not on file     Attends meetings of clubs or organizations: Not on file     Relationship status: Not on file   • Intimate partner violence     Fear of current or ex partner: Not on file     Emotionally abused: Not on file     Physically abused: Not on file     Forced sexual activity: Not on file   Other Topics Concern   • Not on file   Social History Narrative   • Not on file        Family history reviewed and no significant conditions or diseases relevant to the chief complaint were identified    Current Outpatient Medications on File Prior to Visit   Medication Sig Dispense Refill   • VITAMIN D, CHOLECALCIFEROL, PO Take  by mouth.     • Omega-3 Fatty Acids (FISH OIL CONCENTRATE PO) Take  by mouth.     • fluticasone (FLONASE) 50 MCG/ACT nasal spray Spray 1 Spray in nose 2 times a day. 1 Bottle 5   • FLUoxetine (PROZAC) 20 MG Cap TAKE 1 CAPSULE BY MOUTH EVERY DAY 90 Cap 3   • maalox  "plus-benadryl-visc lidocaine (MAGIC MOUTHWASH) Gargle and spit 5 mL every 6 hours as needed for pain. 100 mL 0   • mupirocin calcium (BACTROBAN) 2 % Cream Apply 1 Application to affected area(s) 2 times a day. 1 Tube 0   • estradiol (CLIMARA) 0.025 MG/24HR PATCH WEEKLY APPLY 1 PATCH TO A CLEAN DRY AREA OF THE SKIN ONCE A WEEK 12 Patch 3   • levothyroxine (SYNTHROID) 150 MCG Tab TAKE 1 TABLET BY MOUTH EVERY MORNING BEFORE BREAKFAST ON AN EMPTY STOMACH 90 Tab 3   • triamcinolone acetonide (KENALOG) 0.1 % Ointment Apply 1 Application to affected area(s) 1 time daily as needed. 1 Tube 0   • Aspirin-Acetaminophen-Caffeine (EXCEDRIN PO) Take  by mouth. Last dose 3 pm        No current facility-administered medications on file prior to visit.        Allergies: Macrobid [nitrofurantoin monohydrate macrocrystals]      ROS:   Review of Systems   Constitutional: Negative for chills, fever and weight loss.   HENT: Negative for congestion, sinus pain and sore throat.    Eyes: Negative for pain and discharge.   Respiratory: Negative for cough, sputum production, shortness of breath, wheezing and stridor.    Cardiovascular: Negative for chest pain, orthopnea and leg swelling.   Gastrointestinal: Negative for abdominal pain, diarrhea, nausea and vomiting.   Genitourinary: Negative for dysuria, frequency and urgency.   Musculoskeletal: Negative for myalgias.   Skin: Negative for rash.   Neurological: Negative for dizziness, sensory change, focal weakness, loss of consciousness and headaches.   Psychiatric/Behavioral: Negative.    All other systems reviewed and are negative.      Vitals:  /80 (BP Location: Left arm, Patient Position: Sitting, BP Cuff Size: Adult)   Pulse 72   Temp 36.6 °C (97.9 °F) (Oral)   Resp 14   Ht 1.702 m (5' 7\")   Wt 54.4 kg (120 lb)   SpO2 96%     Physical Exam:  Physical Exam  Constitutional:       General: She is not in acute distress.     Appearance: She is well-developed. She is not " diaphoretic.   Eyes:      General: No scleral icterus.        Right eye: No discharge.         Left eye: No discharge.      Conjunctiva/sclera: Conjunctivae normal.      Pupils: Pupils are equal, round, and reactive to light.   Neck:      Thyroid: No thyromegaly.      Vascular: No JVD.   Cardiovascular:      Rate and Rhythm: Normal rate and regular rhythm.      Heart sounds: Normal heart sounds. No murmur. No gallop.    Pulmonary:      Effort: Pulmonary effort is normal. No respiratory distress.      Breath sounds: Normal breath sounds. No wheezing or rales.   Abdominal:      General: There is no distension.      Palpations: Abdomen is soft.      Tenderness: There is no abdominal tenderness. There is no guarding.   Musculoskeletal:         General: No tenderness.   Lymphadenopathy:      Cervical: No cervical adenopathy.   Skin:     General: Skin is warm.      Capillary Refill: Capillary refill takes less than 2 seconds.      Findings: No erythema or rash.   Neurological:      Mental Status: She is alert and oriented to person, place, and time.      Cranial Nerves: No cranial nerve deficit.      Sensory: No sensory deficit.      Motor: No abnormal muscle tone.   Psychiatric:         Behavior: Behavior normal.       Laboratory Data:    PFTs as reviewed by me personally show: None for review    Imaging as reviewed by me personally show:    CT chest from 12/20/2019 personally reviewed showing multiple peripheral based bilateral pulmonary nodules, largest 6.7 mm in the right upper lobe noncalcified.  No lymphadenopathy.  Otherwise lung parenchyma is unremarkable.    Assessment/Plan:    Problem List Items Addressed This Visit     Multiple pulmonary nodules     Non-smoker, low risk  No red flag symptoms  Screen for coccidiomycosis  Repeat CT chest 6 months, ordered  Return to clinic 7/2020 review of labs and imaging         Relevant Orders    CT-CHEST (THORAX) W/O    COCCI AB PANEL, SERUM    Abdominal aortic aneurysm (AAA)  without rupture (HCC)     4.4 cm on CT 12/2019              Return in about 5 months (around 7/14/2020).     This note was generated using voice recognition software which has a chance of producing errors of grammar and possibly content.  I have made every reasonable attempt to find and correct any obvious errors, but it should be expected that some may not be found prior to finalization of this note.  __________  Darien Wakefield MD  Pulmonary and Critical Care Medicine  [facility]

## 2020-02-25 NOTE — ASSESSMENT & PLAN NOTE
Non-smoker, low risk  No red flag symptoms  Screen for coccidiomycosis  Repeat CT chest 6 months, ordered  Return to clinic 7/2020 review of labs and imaging

## 2020-04-21 ENCOUNTER — TELEPHONE (OUTPATIENT)
Dept: MEDICAL GROUP | Facility: MEDICAL CENTER | Age: 80
End: 2020-04-21

## 2020-04-21 DIAGNOSIS — G44.229 CHRONIC TENSION-TYPE HEADACHE, NOT INTRACTABLE: ICD-10-CM

## 2020-04-21 RX ORDER — BUTALBITAL, ACETAMINOPHEN AND CAFFEINE 50; 325; 40 MG/1; MG/1; MG/1
1 TABLET ORAL 2 TIMES DAILY PRN
Qty: 60 TAB | Refills: 2 | Status: SHIPPED
Start: 2020-04-21 | End: 2020-04-30 | Stop reason: SDUPTHER

## 2020-04-30 DIAGNOSIS — G44.229 CHRONIC TENSION-TYPE HEADACHE, NOT INTRACTABLE: ICD-10-CM

## 2020-04-30 RX ORDER — BUTALBITAL, ACETAMINOPHEN AND CAFFEINE 50; 325; 40 MG/1; MG/1; MG/1
1 TABLET ORAL 2 TIMES DAILY PRN
Qty: 60 TAB | Refills: 5 | Status: SHIPPED
Start: 2020-04-30 | End: 2020-05-04 | Stop reason: SDUPTHER

## 2020-05-04 DIAGNOSIS — G44.229 CHRONIC TENSION-TYPE HEADACHE, NOT INTRACTABLE: ICD-10-CM

## 2020-05-04 RX ORDER — BUTALBITAL, ACETAMINOPHEN AND CAFFEINE 50; 325; 40 MG/1; MG/1; MG/1
1 TABLET ORAL 2 TIMES DAILY PRN
Qty: 60 TAB | Refills: 5 | Status: SHIPPED
Start: 2020-05-04 | End: 2020-06-03

## 2020-05-04 NOTE — TELEPHONE ENCOUNTER
PLEASE CHANGE TO CAPSULES PER PT REQUEST.    Received request via: Pharmacy    Was the patient seen in the last year in this department? Yes    Does the patient have an active prescription (recently filled or refills available) for medication(s) requested? No

## 2020-06-04 ENCOUNTER — APPOINTMENT (RX ONLY)
Dept: URBAN - METROPOLITAN AREA CLINIC 35 | Facility: CLINIC | Age: 80
Setting detail: DERMATOLOGY
End: 2020-06-04

## 2020-06-04 DIAGNOSIS — Z41.9 ENCOUNTER FOR PROCEDURE FOR PURPOSES OTHER THAN REMEDYING HEALTH STATE, UNSPECIFIED: ICD-10-CM

## 2020-06-04 PROCEDURE — ? BOTOX

## 2020-06-04 PROCEDURE — ? FILLERS

## 2020-06-04 NOTE — PROCEDURE: FILLERS
Additional Area 1 Volume In Cc: 0
Additional Area 1 Location: Oral Commisures
Additional Area 4 Location: Scars
Include Cannula Information In Note?: No
Additional Area 5 Location: Earlobes
Detail Level: Detailed
Additional Area 2 Location: Border of lips
Additional Area 3 Location: Fine lines around mouth
Use Map Statement For Sites (Optional): Yes
Consent: Written consent obtained. Risks include but not limited to bruising, beading, irregular texture, ulceration, infection, allergic reaction, scar formation, incomplete augmentation, temporary nature, procedural pain.
Filler: Juvederm Ultra XC
Vermilion Lips Filler Volume In Cc: 0.2
Map Statment: See Attach Map for Complete Details
Additional Area 1 Volume In Cc: 0.3
Price (Use Numbers Only, No Special Characters Or $): 529
Post-Care Instructions: Patient instructed to apply ice to reduce swelling.
Filler Comments: Blended Juvederm Ultra Xc 1:1 with lidocaine no epinephrine and stranded to fines lines around mouth, nasolabial, lip boarder

## 2020-06-04 NOTE — PROCEDURE: BOTOX
Forehead Units: 0
Expiration Date (Month Year): 05/22
Post-Care Instructions: Patient instructed to not lie down for 4 hours after injections and limit physical activity for 24 hours.
Lot #: [id number]
Additional Area 3 Location: [place]
Additional Area 5 Location: perioral
Consent: Verbal and written informed consent were obtained to include the following risks: pain, swelling, bruising, eyelid or eyebrow droop, and lack of visible improvement of wrinkles in the areas treated.  The skin was cleansed with alcohol. Injections were administered with a 32g needle into the following areas:
Reconstitution Date (Optional): 6/4/20
Dilution (U/0.1 Cc): 5
Additional Area 2 Units: 34
Price (Use Numbers Only, No Special Characters Or $): 386
Additional Area 2 Location: [place]
Additional Area 6 Location: platysma
Additional Area 4 Location: Bunny lines
Detail Level: Detailed
Additional Area 1 Location: [place]
Additional Area 1 Units: 16

## 2020-06-23 ENCOUNTER — HOSPITAL ENCOUNTER (OUTPATIENT)
Dept: RADIOLOGY | Facility: MEDICAL CENTER | Age: 80
End: 2020-06-23
Attending: INTERNAL MEDICINE
Payer: MEDICARE

## 2020-06-23 ENCOUNTER — HOSPITAL ENCOUNTER (OUTPATIENT)
Dept: LAB | Facility: MEDICAL CENTER | Age: 80
End: 2020-06-23
Attending: INTERNAL MEDICINE
Payer: MEDICARE

## 2020-06-23 DIAGNOSIS — R91.8 MULTIPLE PULMONARY NODULES: ICD-10-CM

## 2020-06-23 PROCEDURE — 86635 COCCIDIOIDES ANTIBODY: CPT

## 2020-06-23 PROCEDURE — 36415 COLL VENOUS BLD VENIPUNCTURE: CPT

## 2020-06-23 PROCEDURE — 71250 CT THORAX DX C-: CPT

## 2020-06-28 LAB
C IMMITIS IGM SPEC QL IA: 0.2 IV
COCCIDIOIDES AB SPEC QL ID: NORMAL
COCCIDIOIDES AB TITR SER CF: NORMAL {TITER}
COCCIDIOIDES IGG SPEC QL IA: 0.2 IV

## 2020-06-29 ENCOUNTER — TELEPHONE (OUTPATIENT)
Dept: MEDICAL GROUP | Facility: MEDICAL CENTER | Age: 80
End: 2020-06-29

## 2020-06-29 DIAGNOSIS — L98.9 SKIN LESION: ICD-10-CM

## 2020-06-29 NOTE — TELEPHONE ENCOUNTER
Urgent dermatology referral placed.  If she is having difficulty getting into the dermatologist we can see her in clinic for possible biopsy of skin lesion.  Flavio Cooper M.D.

## 2020-06-29 NOTE — TELEPHONE ENCOUNTER
Patient has a growth on her leg and is requesting a referral to [facility]. Please advise and yosef urgent if possible, patient is concerned about this growth.

## 2020-07-01 ENCOUNTER — OFFICE VISIT (OUTPATIENT)
Dept: MEDICAL GROUP | Facility: MEDICAL CENTER | Age: 80
End: 2020-07-01
Payer: MEDICARE

## 2020-07-01 ENCOUNTER — HOSPITAL ENCOUNTER (OUTPATIENT)
Facility: MEDICAL CENTER | Age: 80
End: 2020-07-01
Attending: FAMILY MEDICINE
Payer: MEDICARE

## 2020-07-01 VITALS
WEIGHT: 122.4 LBS | OXYGEN SATURATION: 96 % | HEIGHT: 67 IN | HEART RATE: 88 BPM | BODY MASS INDEX: 19.21 KG/M2 | DIASTOLIC BLOOD PRESSURE: 82 MMHG | TEMPERATURE: 98.4 F | SYSTOLIC BLOOD PRESSURE: 130 MMHG

## 2020-07-01 DIAGNOSIS — L98.9 SKIN LESION OF LEFT LEG: ICD-10-CM

## 2020-07-01 DIAGNOSIS — G44.229 CHRONIC TENSION-TYPE HEADACHE, NOT INTRACTABLE: ICD-10-CM

## 2020-07-01 LAB — PATHOLOGY CONSULT NOTE: NORMAL

## 2020-07-01 PROCEDURE — 11104 PUNCH BX SKIN SINGLE LESION: CPT | Performed by: FAMILY MEDICINE

## 2020-07-01 PROCEDURE — 88305 TISSUE EXAM BY PATHOLOGIST: CPT

## 2020-07-01 PROCEDURE — 99213 OFFICE O/P EST LOW 20 MIN: CPT | Mod: 25 | Performed by: FAMILY MEDICINE

## 2020-07-01 RX ORDER — CODEINE/BUTALBITAL/ASA/CAFFEIN 30-50-325
1 CAPSULE ORAL EVERY 6 HOURS PRN
Qty: 60 CAP | Refills: 0 | Status: SHIPPED | OUTPATIENT
Start: 2020-07-01 | End: 2020-07-31

## 2020-07-01 RX ORDER — LIDOCAINE HCL/EPINEPHRINE/PF 2%-1:200K
1 VIAL (ML) INJECTION ONCE
OUTPATIENT
Start: 2020-07-01 | End: 2020-07-02

## 2020-07-01 ASSESSMENT — FIBROSIS 4 INDEX: FIB4 SCORE: 1.21

## 2020-07-01 NOTE — PROGRESS NOTES
Subjective:   Afsaneh Lyn is a 80 y.o. female here today for skin lesion    Patient has long history of sun exposure, growing up with regular tanning in [place] [facility].  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  She has a new skin lesion on her left lower anterior leg that she would like a referral to dermatology for.  No associated pain or drainage or redness.    Patient also has headaches that she rarely treats with Fiorinal, last prescription was 2 from 2016.  She is requesting a refill today.    Current medicines (including changes today)  Current Outpatient Medications   Medication Sig Dispense Refill   • butalbital-aspirin-caffeine-codeine (FIORINAL/CODEINE #3) -22-30 MG per capsule Take 1 Cap by mouth every 6 hours as needed for Headache for up to 30 days. 60 Cap 0   • VITAMIN D, CHOLECALCIFEROL, PO Take  by mouth.     • Omega-3 Fatty Acids (FISH OIL CONCENTRATE PO) Take  by mouth.     • fluticasone (FLONASE) 50 MCG/ACT nasal spray Spray 1 Spray in nose 2 times a day. 1 Bottle 5   • FLUoxetine (PROZAC) 20 MG Cap TAKE 1 CAPSULE BY MOUTH EVERY DAY 90 Cap 3   • maalox plus-benadryl-visc lidocaine (MAGIC MOUTHWASH) Gargle and spit 5 mL every 6 hours as needed for pain. 100 mL 0   • mupirocin calcium (BACTROBAN) 2 % Cream Apply 1 Application to affected area(s) 2 times a day. 1 Tube 0   • estradiol (CLIMARA) 0.025 MG/24HR PATCH WEEKLY APPLY 1 PATCH TO A CLEAN DRY AREA OF THE SKIN ONCE A WEEK 12 Patch 3   • levothyroxine (SYNTHROID) 150 MCG Tab TAKE 1 TABLET BY MOUTH EVERY MORNING BEFORE BREAKFAST ON AN EMPTY STOMACH 90 Tab 3   • triamcinolone acetonide (KENALOG) 0.1 % Ointment Apply 1 Application to affected area(s) 1 time daily as needed. 1 Tube 0   • Aspirin-Acetaminophen-Caffeine (EXCEDRIN PO) Take  by mouth. Last dose 3 pm        Current Facility-Administered Medications   Medication Dose Route Frequency Provider Last Rate  "Last Dose   • lidocaine-EPINEPHrine 2%-1:309867 injection 1 mL  1 mL Injection Once Flavio Cooper M.D.         She  has a past medical history of Chickenpox, Diverticulosis, Mac[place] measles, Hypothyroid, IBS (irritable bowel syndrome), Influenza, Migraines, Mitral valve prolapse, and Mumps. She also has no past medical history of Breast cancer (HCC).    ROS   No fever, no numbness       Objective:     /82 (BP Location: Right arm, Patient Position: Sitting, BP Cuff Size: Adult)   Pulse 88   Temp 36.9 °C (98.4 °F) (Temporal)   Ht 1.702 m (5' 7\")   Wt 55.5 kg (122 lb 6.4 oz)   SpO2 96%  Body mass index is 19.17 kg/m².   Physical Exam:  Constitutional: Alert, no distress.  Skin: Warm, dry, good turgor.  9 mm x 7 mm raised skin lesion on left anterior lower leg with crusting in the center.  Eye: Equal, round and reactive, conjunctiva clear, lids normal.  Psych: Alert and oriented x3, normal affect and mood.    After informed written consent was obtained, using alcohol swab for cleansing and 2% Lidocaine with epinephrine for anesthetic, with sterile technique a 2 mm punch biopsy was used to obtain a biopsy specimen of the lesion. Hemostasis was obtained by pressure and wound was not sutured. Antibiotic dressing is applied, and wound care instructions provided. Be alert for any signs of cutaneous infection. The specimen is labeled and sent to pathology for evaluation. The procedure was well tolerated without complications.      Assessment and Plan:   The following treatment plan was discussed    1. Skin lesion of left leg  New problem.  Concerning for skin cancer.  Punch biopsy performed today.  Referral to dermatology.  - lidocaine-EPINEPHrine 2%-1:538693 injection 1 mL  - Pathology Specimen; Future    2. Chronic tension-type headache, not intractable  Controlled.  Refill Fiorinal for rare use.  - butalbital-aspirin-caffeine-codeine (FIORINAL/CODEINE #3) -27-30 MG per capsule; Take 1 Cap by mouth every 6 " hours as needed for Headache for up to 30 days.  Dispense: 60 Cap; Refill: 0      Followup: Return if symptoms worsen or fail to improve.

## 2020-07-06 DIAGNOSIS — E03.9 HYPOTHYROIDISM, UNSPECIFIED TYPE: ICD-10-CM

## 2020-07-06 RX ORDER — LEVOTHYROXINE SODIUM 0.15 MG/1
TABLET ORAL
Qty: 90 TAB | Refills: 3 | Status: SHIPPED | OUTPATIENT
Start: 2020-07-06 | End: 2020-11-20 | Stop reason: SDUPTHER

## 2020-07-06 NOTE — TELEPHONE ENCOUNTER
----- Message from Flavio Cooper M.D. sent at 7/6/2020  7:24 AM PDT -----  Please notify patient her skin biopsy came back showing a benign skin lesion.  Flavio Cooper M.D.

## 2020-07-06 NOTE — TELEPHONE ENCOUNTER
Received request via: Patient    Was the patient seen in the last year in this department? Yes    Does the patient have an active prescription (recently filled or refills available) for medication(s) requested? No   4

## 2020-07-09 ENCOUNTER — APPOINTMENT (RX ONLY)
Dept: URBAN - METROPOLITAN AREA CLINIC 35 | Facility: CLINIC | Age: 80
Setting detail: DERMATOLOGY
End: 2020-07-09

## 2020-07-09 DIAGNOSIS — Z41.9 ENCOUNTER FOR PROCEDURE FOR PURPOSES OTHER THAN REMEDYING HEALTH STATE, UNSPECIFIED: ICD-10-CM

## 2020-07-09 PROCEDURE — ? KYBELLA INJECTION

## 2020-07-09 ASSESSMENT — LOCATION SIMPLE DESCRIPTION DERM: LOCATION SIMPLE: SUBMENTAL CHIN

## 2020-07-09 ASSESSMENT — LOCATION DETAILED DESCRIPTION DERM: LOCATION DETAILED: SUBMENTAL CHIN

## 2020-07-09 ASSESSMENT — LOCATION ZONE DERM: LOCATION ZONE: FACE

## 2020-07-09 NOTE — PROCEDURE: KYBELLA INJECTION
Detail Level: Detailed
Kybella Volume In Cc (Won't Render If 0): 0
Treatment Area: [facility]
Procedure Text: The treatment areas were cleansed with alcohol. Kybella was administered using the parameters mentioned above.
Expiration Date (Month Year): 12/21
Treatment Number (Won't Render If 0): 1
Consent: Written consent obtained. The following temporary side effects commonly occur during/after Kybella injections and are to be expected:\\n• Redness/swelling\\n• Bruising\\n• Discomfort\\n• Numbness\\n• Induration (hardening or firmness)\\n• Infection\\nMore rare but important side effects also include:\\n• Temporary injury to the marginal mandibular nerve (results in an uneven smile) was noted in 4% of study patients.\\n• Temporary discomfort and/or difficulty swallowing was noted in 2%.\\nThe Kybella procedure should not be performed on you if you:\\n• Have an active infection of the skin in the treatment area.\\n• Have current or prior history of difficulty swallowing or pain with swallowing.\\n• Are pregnant or breastfeeding.
Number Of Grid Dots (Won't Render If 0): 10
Price (Use Numbers Only, No Special Characters Or $): 923
Post-Care Instructions: Patient instructed to apply ice to reduce swelling.
Lot #: [id number]

## 2020-07-10 ENCOUNTER — TELEPHONE (OUTPATIENT)
Dept: PULMONOLOGY | Facility: HOSPICE | Age: 80
End: 2020-07-10

## 2020-07-10 DIAGNOSIS — R91.1 PULMONARY NODULE: ICD-10-CM

## 2020-07-10 NOTE — TELEPHONE ENCOUNTER
"Darien Wakefield M.D.  You; Mynor Dumont Ass't 50 minutes ago (3:11 PM)      I left a [employer] message for her regarding the CT results, I guess she did not see it.     Ankita, could you please call her and let her know the results? Here is my Team My Mobile message you can read back to her:     \"Hi Arielle- the cat scan showed that the nodules are stable since the last scan in 12/2019. That is very reassuring and makes malignancy (cancer) much less likely. Will need to continue to keep an eye on them with another can in ~ 1 year. Lets get an appointment for you in 6/2021 and will order the cat scan to happen before that visit so we can review it. The blood test checking to see if you've been exposed or have a history of cocci (aka \"valley fever\") was negative, which is also good. Take care\"         Called and spoke with pt. Notified pt of all information above. Pt understood.  "

## 2020-07-10 NOTE — TELEPHONE ENCOUNTER
The patient called to report that she had her chest CT done and it is in the system.  She stated that there was going to be a comparison with her prior CT.  She is asking if you will call her with the results or do you want her to come in for her results.  She does not have a scheduled appointment.  Please advise, thank you.

## 2020-07-10 NOTE — PROGRESS NOTES
Ct chest for 1 year followup of nodule    Orders Placed This Encounter   • CT-CHEST (THORAX) W/O     __________  Darien Wakefield MD  Pulmonary and Critical Care Medicine  [facility]

## 2020-07-28 ENCOUNTER — TELEPHONE (OUTPATIENT)
Dept: MEDICAL GROUP | Facility: MEDICAL CENTER | Age: 80
End: 2020-07-28

## 2020-07-28 RX ORDER — CEPHALEXIN 500 MG/1
500 CAPSULE ORAL 2 TIMES DAILY
Qty: 14 CAP | Refills: 0 | Status: SHIPPED | OUTPATIENT
Start: 2020-07-28 | End: 2020-08-04

## 2020-07-28 NOTE — TELEPHONE ENCOUNTER
I have sent in a prescription for antibiotic to [facility] pharmacy.  Flavio Cooper M.D.

## 2020-07-28 NOTE — TELEPHONE ENCOUNTER
Left a message for patient to call back at [phone].     Masha Rudolph  [facility] Assistant

## 2020-07-28 NOTE — TELEPHONE ENCOUNTER
Pt left vm about the biopsy. Pt states she understands the biopsy is negative however the site on her leg is starting to look infected and read. Please advise.

## 2020-08-06 ENCOUNTER — APPOINTMENT (RX ONLY)
Dept: URBAN - METROPOLITAN AREA CLINIC 35 | Facility: CLINIC | Age: 80
Setting detail: DERMATOLOGY
End: 2020-08-06

## 2020-08-06 DIAGNOSIS — Z41.9 ENCOUNTER FOR PROCEDURE FOR PURPOSES OTHER THAN REMEDYING HEALTH STATE, UNSPECIFIED: ICD-10-CM

## 2020-08-06 PROCEDURE — ? FILLERS

## 2020-08-06 NOTE — PROCEDURE: FILLERS
Additional Area 4 Volume In Cc: 0
Additional Area 3 Location: Fine lines around mouth
Additional Area 2 Location: Border of lips
Additional Area 5 Location: Earlobes
Include Cannula Information In Note?: No
Additional Area 1 Location: Oral Commisures
Additional Area 4 Location: Scars
Detail Level: Detailed
Map Statment: See Attach Map for Complete Details
Use Map Statement For Sites (Optional): Yes
Filler: Juvederm Ultra XC
Lot #: [id number]
Additional Area 1 Volume In Cc: 0.3
Post-Care Instructions: Patient instructed to apply ice to reduce swelling.
Vermilion Lips Filler Volume In Cc: 0.7
Price (Use Numbers Only, No Special Characters Or $): 132
Expiration Date (Month Year): 06/21
Consent: Written consent obtained. Risks include but not limited to bruising, beading, irregular texture, ulceration, infection, allergic reaction, scar formation, incomplete augmentation, temporary nature, procedural pain.

## 2020-08-07 ENCOUNTER — TELEPHONE (OUTPATIENT)
Dept: MEDICAL GROUP | Facility: MEDICAL CENTER | Age: 80
End: 2020-08-07

## 2020-08-07 RX ORDER — FLUCONAZOLE 150 MG/1
150 TABLET ORAL DAILY
Qty: 2 TAB | Refills: 0 | Status: SHIPPED | OUTPATIENT
Start: 2020-08-07 | End: 2020-08-09

## 2020-08-07 NOTE — TELEPHONE ENCOUNTER
VOICEMAIL  1. Caller Name: Afsaneh                      Call Back Number: [phone]    2. Message: Patient has been taking antibiotics and reports that she now as an infection. She is requesting rx for Diflucan. Please advise.    3. Patient approves office to leave a detailed voicemail/MyChart message: N\A

## 2020-08-14 ENCOUNTER — OFFICE VISIT (OUTPATIENT)
Dept: URGENT CARE | Facility: CLINIC | Age: 80
End: 2020-08-14
Payer: MEDICARE

## 2020-08-14 VITALS
HEART RATE: 85 BPM | RESPIRATION RATE: 14 BRPM | HEIGHT: 66 IN | OXYGEN SATURATION: 94 % | BODY MASS INDEX: 19.44 KG/M2 | SYSTOLIC BLOOD PRESSURE: 120 MMHG | WEIGHT: 121 LBS | DIASTOLIC BLOOD PRESSURE: 68 MMHG | TEMPERATURE: 98.3 F

## 2020-08-14 DIAGNOSIS — S60.450A FOREIGN BODY OF RIGHT INDEX FINGER: ICD-10-CM

## 2020-08-14 DIAGNOSIS — L08.9 LOCAL SKIN INFECTION: ICD-10-CM

## 2020-08-14 PROCEDURE — 10060 I&D ABSCESS SIMPLE/SINGLE: CPT | Performed by: PHYSICIAN ASSISTANT

## 2020-08-14 PROCEDURE — 99213 OFFICE O/P EST LOW 20 MIN: CPT | Mod: 25 | Performed by: PHYSICIAN ASSISTANT

## 2020-08-14 RX ORDER — AMOXICILLIN AND CLAVULANATE POTASSIUM 875; 125 MG/1; MG/1
1 TABLET, FILM COATED ORAL 2 TIMES DAILY
Qty: 10 TAB | Refills: 0 | Status: SHIPPED | OUTPATIENT
Start: 2020-08-14 | End: 2020-08-19

## 2020-08-14 ASSESSMENT — ENCOUNTER SYMPTOMS
CHILLS: 0
FEVER: 0
ROS SKIN COMMENTS: PUNCTURE WOUND

## 2020-08-14 ASSESSMENT — FIBROSIS 4 INDEX: FIB4 SCORE: 1.21

## 2020-08-14 NOTE — PROGRESS NOTES
Subjective:   Afsaneh Lyn is a 80 y.o. female who presents today with   Chief Complaint   Patient presents with   • Other     cut finger on glass pot, throbbing , painful R index       Other  This is a new problem. Episode onset: 3 days. The problem occurs constantly. The problem has been unchanged. Pertinent negatives include no chills or fever. The symptoms are aggravated by bending. She has tried nothing for the symptoms. The treatment provided no relief.     Patient states she was picking up a broken glass pot in her garden 2 days ago and she woke up this morning with her finger very swollen. TDAP UTD 2019.  Patient is unsure if there is any foreign body of glass or if it was just from the dirt/bacteria that potentially got introduced.  PMH:  has a past medical history of Chickenpox, Diverticulosis, English measles, Hypothyroid, IBS (irritable bowel syndrome), Influenza, Migraines, Mitral valve prolapse, and Mumps. She also has no past medical history of Breast cancer (HCC).  MEDS:   Current Outpatient Medications:   •  amoxicillin-clavulanate (AUGMENTIN) 875-125 MG Tab, Take 1 Tab by mouth 2 times a day for 5 days., Disp: 10 Tab, Rfl: 0  •  levothyroxine (SYNTHROID) 150 MCG Tab, TAKE ONE TABLET BY MOUTH EVERY MORNING ON EMPTY STOMACH, Disp: 90 Tab, Rfl: 3  •  FLUoxetine (PROZAC) 20 MG Cap, TAKE 1 CAPSULE BY MOUTH EVERY DAY, Disp: 90 Cap, Rfl: 3  •  VITAMIN D, CHOLECALCIFEROL, PO, Take  by mouth., Disp: , Rfl:   •  Omega-3 Fatty Acids (FISH OIL CONCENTRATE PO), Take  by mouth., Disp: , Rfl:   •  Aspirin-Acetaminophen-Caffeine (EXCEDRIN PO), Take  by mouth. Last dose 3 pm , Disp: , Rfl:   ALLERGIES:   Allergies   Allergen Reactions   • Macrobid [Nitrofurantoin Monohydrate Macrocrystals] Nausea     Cramp in legs     SURGHX:   Past Surgical History:   Procedure Laterality Date   • ABDOMINAL HYSTERECTOMY TOTAL      Hysterectomy, Total Abdominal   • PB ENLARGE BREAST WITH IMPLANT     • US-NEEDLE CORE  "BX-BREAST PANEL       SOCHX:  reports that she has never smoked. She has never used smokeless tobacco. She reports current alcohol use of about 0.6 oz of alcohol per week. She reports that she does not use drugs.  FH: Reviewed with patient, not pertinent to this visit.       Review of Systems   Constitutional: Negative for chills and fever.   Skin:        Puncture wound   All other systems reviewed and are negative.       Objective:   /68   Pulse 85   Temp 36.8 °C (98.3 °F) (Temporal)   Resp 14   Ht 1.676 m (5' 6\")   Wt 54.9 kg (121 lb)   SpO2 94%   BMI 19.53 kg/m²   Physical Exam  Vitals signs and nursing note reviewed.   Constitutional:       General: She is not in acute distress.     Appearance: She is well-developed.   HENT:      Head: Normocephalic and atraumatic.      Right Ear: Hearing normal.      Left Ear: Hearing normal.   Eyes:      Pupils: Pupils are equal, round, and reactive to light.   Cardiovascular:      Rate and Rhythm: Normal rate and regular rhythm.      Heart sounds: Normal heart sounds.   Pulmonary:      Effort: Pulmonary effort is normal.      Breath sounds: Normal breath sounds.   Musculoskeletal:      Comments: Patient has full range of motion at the DIP of the right index finger.  Distal to the DIP finger is very red and swollen with 2 healed puncture sites.  More medial puncture site is tender with palpation   Skin:     General: Skin is warm and dry.             Comments: No foreign body realized.   Neurological:      Mental Status: She is alert.      Coordination: Coordination normal.   Psychiatric:         Mood and Affect: Mood normal.       Discussed potential outcomes and risks of procedure today and patient is understanding and would like to have attempt at removing potential glass in her finger even though no foreign body can be visualized prior to procedure.  Area was soaked with Hibiclens.  Area was then cleaned with alcohol swab and 2% lidocaine without epinephrine " was injected approximately 1 mL surrounding the puncture site.  Sterile procedure throughout.  Sterile instruments including splinter forceps were used to explore the area for any foreign body.  No foreign body was appreciated on the medial puncture wound.  To the lateral wound there was a small amount of debris that was taken out with the forceps.  Area was then cleaned with saline and antibiotic ointment and dressing placed.  Incisions were less than 0.25 cm in total and superficial.  Patient had full strength and range of motion to the distal portion of her right index finger following procedure.  Assessment/Plan:   Assessment    1. Foreign body of right index finger    2. Local skin infection  - amoxicillin-clavulanate (AUGMENTIN) 875-125 MG Tab; Take 1 Tab by mouth 2 times a day for 5 days.  Dispense: 10 Tab; Refill: 0  Wound care instructions and close monitoring discussed with patient.  She will return with any concerns.  Differential diagnosis, natural history, supportive care, and indications for immediate follow-up discussed.   Patient given instructions and understanding of medications and treatment.    If not improving in 3-5 days, F/U with PCP or return to  if symptoms worsen.    Patient agreeable to plan.      Please note that this dictation was created using voice recognition software. I have made every reasonable attempt to correct obvious errors, but I expect that there are errors of grammar and possibly content that I did not discover before finalizing the note.    Domo Aviles PA-C

## 2020-08-16 ENCOUNTER — OFFICE VISIT (OUTPATIENT)
Dept: URGENT CARE | Facility: CLINIC | Age: 80
End: 2020-08-16
Payer: MEDICARE

## 2020-08-16 VITALS
TEMPERATURE: 99.3 F | RESPIRATION RATE: 14 BRPM | HEART RATE: 97 BPM | DIASTOLIC BLOOD PRESSURE: 80 MMHG | SYSTOLIC BLOOD PRESSURE: 112 MMHG | BODY MASS INDEX: 18.07 KG/M2 | WEIGHT: 122 LBS | OXYGEN SATURATION: 96 % | HEIGHT: 69 IN

## 2020-08-16 DIAGNOSIS — L08.9 FINGER INFECTION: ICD-10-CM

## 2020-08-16 PROCEDURE — 99214 OFFICE O/P EST MOD 30 MIN: CPT | Performed by: FAMILY MEDICINE

## 2020-08-16 RX ORDER — DOXYCYCLINE HYCLATE 100 MG
100 TABLET ORAL 2 TIMES DAILY
Qty: 14 TAB | Refills: 0 | Status: SHIPPED | OUTPATIENT
Start: 2020-08-16 | End: 2020-08-23

## 2020-08-16 ASSESSMENT — FIBROSIS 4 INDEX: FIB4 SCORE: 1.21

## 2020-08-17 NOTE — PROGRESS NOTES
Chief Complaint:    Chief Complaint   Patient presents with   • Hand Pain     right index finger infection       History of Present Illness:    She has pain and swelling in right index finger. She was seen here for this on 8/14/2020, had the puncture wounds manually explored, and rx'd Augmentin 875 mg BID x 5 days. She is taking this medication. She presents today because she reports there is spreading redness and swelling in right index finger proximally. She does not feel there is retained glass in the finger.      Review of Systems:    Constitutional: Negative for fever, chills, and diaphoresis.   Eyes: Negative for change in vision, photophobia, pain, redness, and discharge.  ENT: Negative for ear pain, ear discharge, hearing loss, tinnitus, nasal congestion, nosebleeds, and sore throat.    Respiratory: Negative for cough, hemoptysis, sputum production, shortness of breath, wheezing, and stridor.    Cardiovascular: Negative for chest pain, palpitations, orthopnea, claudication, leg swelling, and PND.   Gastrointestinal: Negative for abdominal pain, nausea, vomiting, diarrhea, constipation, blood in stool, and melena.   Genitourinary: Negative for dysuria, urinary urgency, urinary frequency, hematuria, and flank pain.   Musculoskeletal: See HPI.  Skin: See HPI.  Neurological: Negative for dizziness, tingling, tremors, sensory change, speech change, focal weakness, seizures, loss of consciousness, and headaches.   Endo: Hypothyroid, on medication.  Heme: Does not bruise/bleed easily.   Psychiatric/Behavioral: No new symptoms.       Past Medical History:    Past Medical History:   Diagnosis Date   • Chickenpox    • Diverticulosis    • [place]gu[place] measles    • Hypothyroid    • IBS (irritable bowel syndrome)    • Influenza    • Migraines    • Mitral valve prolapse    • Mumps      Past Surgical History:    Past Surgical History:   Procedure Laterality Date   • ABDOMINAL HYSTERECTOMY TOTAL      Hysterectomy, Total Abdominal    • PB ENLARGE BREAST WITH IMPLANT     • US-NEEDLE CORE BX-BREAST PANEL       Social History:    Social History     Socioeconomic History   • Marital status: Single     Spouse name: Not on file   • Number of children: Not on file   • Years of education: Not on file   • Highest education level: Not on file   Occupational History   • Not on file   Social Needs   • Financial resource strain: Not on file   • Food insecurity     Worry: Not on file     Inability: Not on file   • Transportation needs     Medical: Not on file     Non-medical: Not on file   Tobacco Use   • Smoking status: Never Smoker   • Smokeless tobacco: Never Used   Substance and Sexual Activity   • Alcohol use: Yes     Alcohol/week: 0.6 oz     Types: 1 Glasses of wine per week     Comment: wine daily   • Drug use: No   • Sexual activity: Not on file     Comment: , two daughters,    Lifestyle   • Physical activity     Days per week: Not on file     Minutes per session: Not on file   • Stress: Not on file   Relationships   • Social connections     Talks on phone: Not on file     Gets together: Not on file     Attends Sabianist service: Not on file     Active member of club or organization: Not on file     Attends meetings of clubs or organizations: Not on file     Relationship status: Not on file   • Intimate partner violence     Fear of current or ex partner: Not on file     Emotionally abused: Not on file     Physically abused: Not on file     Forced sexual activity: Not on file   Other Topics Concern   • Not on file   Social History Narrative   • Not on file     Family History:    No family history on file.    Medications:    Current Outpatient Medications on File Prior to Visit   Medication Sig Dispense Refill   • amoxicillin-clavulanate (AUGMENTIN) 875-125 MG Tab Take 1 Tab by mouth 2 times a day for 5 days. 10 Tab 0   • levothyroxine (SYNTHROID) 150 MCG Tab TAKE ONE TABLET BY MOUTH EVERY MORNING ON EMPTY STOMACH 90 Tab 3   •  "VITAMIN D, CHOLECALCIFEROL, PO Take  by mouth.     • Omega-3 Fatty Acids (FISH OIL CONCENTRATE PO) Take  by mouth.     • FLUoxetine (PROZAC) 20 MG Cap TAKE 1 CAPSULE BY MOUTH EVERY DAY 90 Cap 3   • Aspirin-Acetaminophen-Caffeine (EXCEDRIN PO) Take  by mouth. Last dose 3 pm        No current facility-administered medications on file prior to visit.      Allergies:    Allergies   Allergen Reactions   • Macrobid [Nitrofurantoin Monohydrate Macrocrystals] Nausea     Cramp in legs       Vitals:    Vitals:    08/16/20 1913   BP: 112/80   Pulse: 97   Resp: 14   Temp: 37.4 °C (99.3 °F)   SpO2: 96%   Weight: 55.3 kg (122 lb)   Height: 1.753 m (5' 9\")       Physical Exam:    Constitutional: Vital signs reviewed. Appears well-developed and well-nourished. No acute distress.   Eyes: Sclera white, conjunctivae clear.  ENT: External ears normal. Hearing normal.  Cardiovascular: Peripheral pulses 2+.   Pulmonary/Chest: Respirations non-labored.  Musculoskeletal: Normal gait. Normal range of motion. No muscular atrophy or weakness.  Neurological: Alert and oriented to person, place, and time. Muscle tone normal. Coordination normal. Light touch and sensation normal.   Skin: Right index finger has two wounds distally with surrounding erythema, swelling, and tenderness to palpation. There is spreading erythema and swelling proximally to PIP joint.  Psychiatric: Normal mood and affect. Behavior is normal. Judgment and thought content normal.       Assessment / Plan:    1. Finger infection  - doxycycline (VIBRAMYCIN) 100 MG Tab; Take 1 Tab by mouth 2 times a day for 7 days.  Dispense: 14 Tab; Refill: 0      Discussed with her DDX, management options, and risks, benefits, and alternatives to treatment plan agreed upon.    She will finish Augmentin rx'd 8/14/2020.    Agreeable to medication prescribed.    Discussed expected course of duration, time for improvement, and to seek follow-up in Emergency Room, urgent care, or with PCP if " getting worse at any time or not improving within expected time frame.

## 2020-09-10 ENCOUNTER — APPOINTMENT (RX ONLY)
Dept: URBAN - METROPOLITAN AREA CLINIC 35 | Facility: CLINIC | Age: 80
Setting detail: DERMATOLOGY
End: 2020-09-10

## 2020-09-10 DIAGNOSIS — Z41.9 ENCOUNTER FOR PROCEDURE FOR PURPOSES OTHER THAN REMEDYING HEALTH STATE, UNSPECIFIED: ICD-10-CM

## 2020-09-10 PROCEDURE — ? KYBELLA INJECTION

## 2020-09-10 ASSESSMENT — LOCATION SIMPLE DESCRIPTION DERM
LOCATION SIMPLE: LEFT CHEEK
LOCATION SIMPLE: SUBMENTAL CHIN
LOCATION SIMPLE: RIGHT CHEEK

## 2020-09-10 ASSESSMENT — LOCATION DETAILED DESCRIPTION DERM
LOCATION DETAILED: SUBMENTAL CHIN
LOCATION DETAILED: LEFT CENTRAL MANDIBULAR CHEEK
LOCATION DETAILED: RIGHT CENTRAL MANDIBULAR CHEEK

## 2020-09-10 ASSESSMENT — LOCATION ZONE DERM
LOCATION ZONE: FACE
LOCATION ZONE: FACE

## 2020-09-10 NOTE — PROCEDURE: KYBELLA INJECTION
Detail Level: Detailed
Kybella Volume In Cc (Won't Render If 0): 0
Treatment [facility] [place]
Procedure Text: The treatment areas were cleansed with alcohol. Kybella was administered using the parameters mentioned above.
Expiration Date (Month Year): 1/22
Treatment Number (Won't Render If 0): 2
Consent: Written consent obtained. The following temporary side effects commonly occur during/after Kybella injections and are to be expected:\\n• Redness/swelling\\n• Bruising\\n• Discomfort\\n• Numbness\\n• Induration (hardening or firmness)\\n• Infection\\nMore rare but important side effects also include:\\n• Temporary injury to the marginal mandibular nerve (results in an uneven smile) was noted in 4% of study patients.\\n• Temporary discomfort and/or difficulty swallowing was noted in 2%.\\nThe Kybella procedure should not be performed on you if you:\\n• Have an active infection of the skin in the treatment area.\\n• Have current or prior history of difficulty swallowing or pain with swallowing.\\n• Are pregnant or breastfeeding.
Packages Used (Won't Render If 0 - Required If Using Inventory): 1
Number Of Grid Dots (Won't Render If 0): 10
Price (Use Numbers Only, No Special Characters Or $): 582
Post-Care Instructions: Patient instructed to apply ice to reduce swelling.
Lot #: [id number]

## 2020-09-10 NOTE — HPI: COSMETIC (KYBELLA)
Have You Had Kybella Injections Before?: has had Kybella injections before
When Was Your Last Kybella Injection?: 7/9/20

## 2020-09-10 NOTE — PROCEDURE: KYBELLA INJECTION
Detail Level: Detailed
Kybella Volume In Cc (Won't Render If 0): 0
Treatment Area: [facility]
Procedure Text: The treatment areas were cleansed with alcohol. Kybella was administered using the parameters mentioned above.
Expiration Date (Month Year): 12/21
Treatment Number (Won't Render If 0): 2
Consent: Written consent obtained. The following temporary side effects commonly occur during/after Kybella injections and are to be expected:\\n• Redness/swelling\\n• Bruising\\n• Discomfort\\n• Numbness\\n• Induration (hardening or firmness)\\n• Infection\\nMore rare but important side effects also include:\\n• Temporary injury to the marginal mandibular nerve (results in an uneven smile) was noted in 4% of study patients.\\n• Temporary discomfort and/or difficulty swallowing was noted in 2%.\\nThe Kybella procedure should not be performed on you if you:\\n• Have an active infection of the skin in the treatment area.\\n• Have current or prior history of difficulty swallowing or pain with swallowing.\\n• Are pregnant or breastfeeding.
Packages Used (Won't Render If 0 - Required If Using Inventory): 1
Number Of Grid Dots (Won't Render If 0): 10
Price (Use Numbers Only, No Special Characters Or $): 
Post-Care Instructions: Patient instructed to apply ice to reduce swelling.
Lot #: [id number]

## 2020-09-14 DIAGNOSIS — F41.9 ANXIETY: ICD-10-CM

## 2020-09-14 RX ORDER — LORAZEPAM 1 MG/1
TABLET ORAL
Qty: 30 TAB | Refills: 5 | Status: SHIPPED | OUTPATIENT
Start: 2020-09-14 | End: 2020-11-20 | Stop reason: SDUPTHER

## 2020-09-27 ENCOUNTER — OFFICE VISIT (OUTPATIENT)
Dept: URGENT CARE | Facility: CLINIC | Age: 80
End: 2020-09-27
Payer: MEDICARE

## 2020-09-27 VITALS
SYSTOLIC BLOOD PRESSURE: 170 MMHG | WEIGHT: 120 LBS | HEART RATE: 86 BPM | DIASTOLIC BLOOD PRESSURE: 96 MMHG | HEIGHT: 67 IN | BODY MASS INDEX: 18.83 KG/M2 | OXYGEN SATURATION: 97 % | TEMPERATURE: 97.7 F | RESPIRATION RATE: 14 BRPM

## 2020-09-27 DIAGNOSIS — J01.00 ACUTE MAXILLARY SINUSITIS, RECURRENCE NOT SPECIFIED: ICD-10-CM

## 2020-09-27 LAB
INT CON NEG: NEGATIVE
INT CON POS: POSITIVE
S PYO AG THROAT QL: NEGATIVE

## 2020-09-27 PROCEDURE — 87880 STREP A ASSAY W/OPTIC: CPT | Performed by: PHYSICIAN ASSISTANT

## 2020-09-27 PROCEDURE — 99214 OFFICE O/P EST MOD 30 MIN: CPT | Performed by: PHYSICIAN ASSISTANT

## 2020-09-27 RX ORDER — AMOXICILLIN AND CLAVULANATE POTASSIUM 875; 125 MG/1; MG/1
1 TABLET, FILM COATED ORAL 2 TIMES DAILY
Qty: 14 TAB | Refills: 0 | Status: SHIPPED | OUTPATIENT
Start: 2020-09-27 | End: 2020-10-04

## 2020-09-27 ASSESSMENT — ENCOUNTER SYMPTOMS
FEVER: 0
NAUSEA: 0
DIAPHORESIS: 0
SINUS PAIN: 1
ABDOMINAL PAIN: 0
COUGH: 0
CHILLS: 0
BLURRED VISION: 0
SINUS PRESSURE: 1
HOARSE VOICE: 0
DIZZINESS: 0
SORE THROAT: 1
MYALGIAS: 0
DOUBLE VISION: 0
NECK PAIN: 0
SHORTNESS OF BREATH: 0
TINGLING: 0
SPUTUM PRODUCTION: 0
HEADACHES: 0
VOMITING: 0
PALPITATIONS: 0
SWOLLEN GLANDS: 0

## 2020-09-27 ASSESSMENT — FIBROSIS 4 INDEX: FIB4 SCORE: 1.21

## 2020-09-27 NOTE — PROGRESS NOTES
Subjective:   Afsaneh Lyn is a 80 y.o. female who presents for Pharyngitis (with sinus pain x 3 days)      Sinusitis  This is a new (3 days.  Right-sided maxillary sinus pain and pressure.  Mild sore throat.  Denies any fevers, chills, shortness of breath, chest pain headaches or visual change.) problem. The current episode started in the past 7 days. There has been no fever. The pain is moderate. Associated symptoms include congestion, ear pain (Right ear), sinus pressure and a sore throat. Pertinent negatives include no chills, coughing, diaphoresis, headaches, hoarse voice, neck pain, shortness of breath or swollen glands. Past treatments include acetaminophen (Zinc). The treatment provided no relief.       Review of Systems   Constitutional: Negative for chills, diaphoresis, fever and malaise/fatigue.   HENT: Positive for congestion, ear pain (Right ear), sinus pressure, sinus pain and sore throat. Negative for hoarse voice.    Eyes: Negative for blurred vision and double vision.   Respiratory: Negative for cough, sputum production and shortness of breath.    Cardiovascular: Negative for chest pain and palpitations.   Gastrointestinal: Negative for abdominal pain, nausea and vomiting.   Musculoskeletal: Negative for myalgias and neck pain.   Neurological: Negative for dizziness, tingling and headaches.       Medications:    • amoxicillin-clavulanate Tabs  • EXCEDRIN PO  • FISH OIL CONCENTRATE PO  • FLUoxetine Caps  • levothyroxine Tabs  • LORazepam Tabs  • VITAMIN D (CHOLECALCIFEROL) PO    Allergies: Macrobid [nitrofurantoin monohydrate macrocrystals]    Problem List: Afsaneh Lyn has Diverticulitis; Hypothyroid; Tension headache, chronic; Diverticulosis; Menopausal symptoms; Insomnia; Hx of hysterectomy for benign disease; GERD (gastroesophageal reflux disease); Dry eye syndrome; Anxiety; Pure hypercholesterolemia; Vitamin D insufficiency; Recurrent major depression in partial remission  "(HCC); Left wrist pain; Open wound of right lower leg; H/O nonmelanoma skin cancer; Dermatitis; Leg edema, right; Current mild episode of major depressive disorder without prior episode (HCC); Abdominal aortic aneurysm (AAA) without rupture (HCC); and Multiple pulmonary nodules on their problem list.    Surgical History:  Past Surgical History:   Procedure Laterality Date   • ABDOMINAL HYSTERECTOMY TOTAL      Hysterectomy, Total Abdominal   • PB ENLARGE BREAST WITH IMPLANT     • US-NEEDLE CORE BX-BREAST PANEL         Past Social Hx: Afsaneh Lyn  reports that she has never smoked. She has never used smokeless tobacco. She reports previous alcohol use of about 0.6 oz of alcohol per week. She reports that she does not use drugs.     Past Family Hx:  Afsaneh Lyn family history is not on file.     Problem list, medications, and allergies reviewed by myself today in Epic.     Objective:     BP (!) 170/96 (BP Location: Right arm, Patient Position: Sitting, BP Cuff Size: Adult)   Pulse 86   Temp 36.5 °C (97.7 °F) (Temporal)   Resp 14   Ht 1.702 m (5' 7\")   Wt 54.4 kg (120 lb)   SpO2 97%   BMI 18.79 kg/m²     Physical Exam  Constitutional:       General: She is not in acute distress.     Appearance: Normal appearance. She is not ill-appearing, toxic-appearing or diaphoretic.   HENT:      Head: Normocephalic and atraumatic.      Comments: Tenderness to palpation over the right maxillary sinus.     Right Ear: Ear canal and external ear normal.      Left Ear: Tympanic membrane, ear canal and external ear normal.      Ears:      Comments: Right TM erythematous.  No bulging or purulence.  Left TM normal     Nose: Congestion present. No rhinorrhea.      Mouth/Throat:      Mouth: Mucous membranes are moist.      Pharynx: Posterior oropharyngeal erythema present. No oropharyngeal exudate.      Comments: Posterior oropharynx mildly erythematous.  No tonsillar edema or exudate.  Uvula midline " nondeviated.    No parotid gland enlargement.  No submandibular gland enlargement or tenderness.  Eyes:      Conjunctiva/sclera: Conjunctivae normal.   Neck:      Musculoskeletal: Normal range of motion. No neck rigidity or muscular tenderness.   Cardiovascular:      Rate and Rhythm: Normal rate and regular rhythm.      Pulses: Normal pulses.      Heart sounds: Normal heart sounds.   Pulmonary:      Effort: Pulmonary effort is normal.      Breath sounds: Normal breath sounds. No wheezing.   Abdominal:      Palpations: Abdomen is soft.      Tenderness: There is no abdominal tenderness.   Lymphadenopathy:      Cervical: No cervical adenopathy.   Skin:     General: Skin is warm and dry.      Capillary Refill: Capillary refill takes less than 2 seconds.   Neurological:      General: No focal deficit present.      Mental Status: She is alert and oriented to person, place, and time. Mental status is at baseline.   Psychiatric:         Mood and Affect: Mood normal.         Thought Content: Thought content normal.       Rapid strep: Negative    Assessment/Plan:     Diagnosis and associated orders:     1. Acute maxillary sinusitis, recurrence not specified  amoxicillin-clavulanate (AUGMENTIN) 875-125 MG Tab      Comments/MDM:       - Increase hydration  - Steam inhalation 20-30 min/day TID  - Saline irrigation  - Elevation of head at night  - Avoid cigarette smoke/fumes, caffeine and alcohol.  - NSAIDs/Acetaminophen PRN   -Take antibiotic as directed.  Take with food recommended starting a probiotic.   -Patient's blood pressure was elevated during exam today.  She is experiencing no red flag symptoms.  Physical exam was within normal limits.  She does not take anything for her blood pressure.  I recommended closely watching this over the next few days.  Discussed possible red flag symptoms and complications of hypertension if any these occur I would like her to return to clinic or nearest emergency department.            Differential diagnosis, natural history, supportive care, and indications for immediate follow-up discussed.    Advised the patient to follow-up with the primary care physician for recheck, reevaluation, and consideration of further management.    Please note that this dictation was created using voice recognition software. I have made reasonable attempt to correct obvious errors, but I expect that there are errors of grammar and possibly content that I did not discover before finalizing the note.    This note was electronically signed by EVELIO Esteban PA-C

## 2020-10-14 ENCOUNTER — APPOINTMENT (RX ONLY)
Dept: URBAN - METROPOLITAN AREA CLINIC 35 | Facility: CLINIC | Age: 80
Setting detail: DERMATOLOGY
End: 2020-10-14

## 2020-10-14 DIAGNOSIS — Z41.9 ENCOUNTER FOR PROCEDURE FOR PURPOSES OTHER THAN REMEDYING HEALTH STATE, UNSPECIFIED: ICD-10-CM

## 2020-10-14 PROCEDURE — ? BOTOX

## 2020-10-14 PROCEDURE — ? FILLERS

## 2020-10-14 NOTE — PROCEDURE: FILLERS
Marionette Lines Filler Volume In Cc: 0
Additional Area 4 Location: Scars
Additional Area 5 Location: [place]
Include Cannula Information In Note?: No
Additional Area 3 Location: Fine lines around mouth
Additional Area 1 Location: Oral Commisures
Detail Level: Detailed
Filler: Juvederm Ultra XC
Additional Area 2 Location: Border of lips
Topical Anesthesia?: BLT cream (benzocaine 20%, lidocaine 6%, tetracaine 4%)
Map Statment: See Attach Map for Complete Details
[place]erm[place] Lips Filler Volume In Cc: 0.4
Post-Care Instructions: Patient instructed to apply ice to reduce swelling.
Price (Use Numbers Only, No Special Characters Or $): 400
Lot #: [id number]
Consent: Written consent obtained. Risks include but not limited to bruising, beading, irregular texture, ulceration, infection, allergic reaction, scar formation, incomplete augmentation, temporary nature, procedural pain.
Use Map Statement For Sites (Optional): Yes
Expiration Date (Month Year): 10/22/20

## 2020-10-14 NOTE — PROCEDURE: BOTOX
Forehead Units: 0
Expiration Date (Month Year): 12/22
Post-Care Instructions: Patient instructed to not lie down for 4 hours after injections and limit physical activity for 24 hours.
Lot #: [id number]
Additional Area 3 Location: [place]
Additional Area 5 Location: perioral
Consent: Verbal and written informed consent were obtained to include the following risks: pain, swelling, bruising, eyelid or eyebrow droop, and lack of visible improvement of wrinkles in the areas treated.  The skin was cleansed with alcohol. Injections were administered with a 32g needle into the following areas:
Reconstitution Date (Optional): 10/14/20
Dilution (U/0.1 Cc): 1.1
Additional Area 2 Units: 34
Price (Use Numbers Only, No Special Characters Or $): 639
Additional Area 2 Location: [place]
Additional Area 6 Location: platysma
Additional Area 4 Location: Bunny lines
Detail Level: Detailed
Additional Area 1 Location: [place]
Additional Area 1 Units: 16

## 2020-11-20 ENCOUNTER — OFFICE VISIT (OUTPATIENT)
Dept: MEDICAL GROUP | Facility: MEDICAL CENTER | Age: 80
End: 2020-11-20
Payer: MEDICARE

## 2020-11-20 VITALS
SYSTOLIC BLOOD PRESSURE: 126 MMHG | OXYGEN SATURATION: 90 % | BODY MASS INDEX: 18.83 KG/M2 | WEIGHT: 120 LBS | DIASTOLIC BLOOD PRESSURE: 74 MMHG | HEART RATE: 82 BPM | TEMPERATURE: 97.9 F | HEIGHT: 67 IN

## 2020-11-20 DIAGNOSIS — F33.41 RECURRENT MAJOR DEPRESSION IN PARTIAL REMISSION (HCC): ICD-10-CM

## 2020-11-20 DIAGNOSIS — F41.9 ANXIETY: ICD-10-CM

## 2020-11-20 DIAGNOSIS — I71.21 ASCENDING AORTIC ANEURYSM (HCC): ICD-10-CM

## 2020-11-20 DIAGNOSIS — E03.9 HYPOTHYROIDISM, UNSPECIFIED TYPE: ICD-10-CM

## 2020-11-20 PROCEDURE — 99214 OFFICE O/P EST MOD 30 MIN: CPT | Performed by: FAMILY MEDICINE

## 2020-11-20 RX ORDER — FLUOXETINE HYDROCHLORIDE 40 MG/1
20 CAPSULE ORAL
Qty: 90 CAP | Refills: 3 | Status: SHIPPED | OUTPATIENT
Start: 2020-11-20 | End: 2023-08-16

## 2020-11-20 RX ORDER — LORAZEPAM 1 MG/1
TABLET ORAL
Qty: 30 TAB | Refills: 5 | Status: SHIPPED | OUTPATIENT
Start: 2020-11-20 | End: 2020-12-20

## 2020-11-20 RX ORDER — LEVOTHYROXINE SODIUM 0.15 MG/1
TABLET ORAL
Qty: 90 TAB | Refills: 3 | Status: SHIPPED | OUTPATIENT
Start: 2020-11-20 | End: 2023-08-16

## 2020-11-20 ASSESSMENT — FIBROSIS 4 INDEX: FIB4 SCORE: 1.21

## 2020-11-20 NOTE — ASSESSMENT & PLAN NOTE
Fluoxetine was increased from 20 mg a day to 40 mg a day because of increased stress.  She is doing better with fluoxetine 40 mg daily.

## 2020-11-20 NOTE — PROGRESS NOTES
"Subjective:   Afsaneh Lyn is a 80 y.o. female here today for hypothyroidism    Ascending aortic aneurysm (HCC)  4.4 cm on CT December 2019 and June 2020    Hypothyroid  Patient is currently tolerating levothyroxine 150 mcg daily without any noticeable side effects or hypo or hyperthyroid symptoms.    Anxiety  Patient is currently doing well with fluoxetine 40 mg daily and lorazepam 1 mg daily.    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  She is doing better with fluoxetine 40 mg daily.         Current medicines (including changes today)  Current Outpatient Medications   Medication Sig Dispense Refill   • levothyroxine (SYNTHROID) 150 MCG Tab TAKE ONE TABLET BY MOUTH EVERY MORNING ON EMPTY STOMACH 90 Tab 3   • FLUoxetine (PROZAC) 40 MG capsule Take 1 Cap by mouth every day. 90 Cap 3   • LORazepam (ATIVAN) 1 MG Tab TAKE 1 TABLET BY MOUTH EVERY DAY AS NEEDED FOR ANXIETY F41.9 30 DYS 30 Tab 5   • VITAMIN D, CHOLECALCIFEROL, PO Take  by mouth.     • Omega-3 Fatty Acids (FISH OIL CONCENTRATE PO) Take  by mouth.     • Aspirin-Acetaminophen-Caffeine (EXCEDRIN PO) Take  by mouth. Last dose 3 pm        No current facility-administered medications for this visit.      She  has a past medical history of Chickenpox, Diverticulosis, [place] measles, Hypothyroid, IBS (irritable bowel syndrome), Influenza, Migraines, Mitral valve prolapse, and Mumps. She also has no past medical history of Breast cancer (HCC).    ROS   No chest pain, no shortness of breath       Objective:     /74 (BP Location: Right arm, Patient Position: Sitting)   Pulse 82   Temp 36.6 °C (97.9 °F) (Temporal)   Ht 1.702 m (5' 7\")   Wt 54.4 kg (120 lb)   SpO2 90%  Body mass index is 18.79 kg/m².   Physical Exam:  Constitutional: Alert, no distress.  Skin: Warm, dry, good turgor, no rashes in visible areas.  Eye: Equal, round and " reactive, conjunctiva clear, lids normal.  Psych: Alert and oriented x3, normal affect and mood.        Assessment and Plan:   The following treatment plan was discussed    1. Hypothyroidism, unspecified type  Controlled.  Continue levothyroxine.  Follow-up in 6 months for annual.  - levothyroxine (SYNTHROID) 150 MCG Tab; TAKE ONE TABLET BY MOUTH EVERY MORNING ON EMPTY STOMACH  Dispense: 90 Tab; Refill: 3    2. Anxiety  Controlled.  Continue lorazepam.  - LORazepam (ATIVAN) 1 MG Tab; TAKE 1 TABLET BY MOUTH EVERY DAY AS NEEDED FOR ANXIETY F41.9 30 DYS  Dispense: 30 Tab; Refill: 5    3. Ascending aortic aneurysm (HCC)  Stable.  Continue following with CT scan annually.    4. Recurrent major depression in partial remission (HCC)  Controlled.  Continue fluoxetine.  - FLUoxetine (PROZAC) 40 MG capsule; Take 1 Cap by mouth every day.  Dispense: 90 Cap; Refill: 3      Followup: Return in about 6 months (around 5/20/2021).

## 2020-11-30 ENCOUNTER — TELEPHONE (OUTPATIENT)
Dept: MEDICAL GROUP | Facility: MEDICAL CENTER | Age: 80
End: 2020-11-30

## 2020-11-30 RX ORDER — CIPROFLOXACIN 500 MG/1
500 TABLET, FILM COATED ORAL 2 TIMES DAILY
Qty: 14 TAB | Refills: 0 | Status: SHIPPED | OUTPATIENT
Start: 2020-11-30 | End: 2020-12-07

## 2020-11-30 RX ORDER — METRONIDAZOLE 500 MG/1
500 TABLET ORAL 3 TIMES DAILY
Qty: 21 TAB | Refills: 0 | Status: SHIPPED | OUTPATIENT
Start: 2020-11-30 | End: 2020-12-07

## 2020-11-30 NOTE — TELEPHONE ENCOUNTER
Patient had a Diverticulitis flare-up over the holiday. She is requesting rx for Cipro. Please advise and send to [facility] if applicable.

## 2020-11-30 NOTE — TELEPHONE ENCOUNTER
I have sent in a prescriptions for Cipro and Flagyl to treat diverticulitis to [facility].  Flavio Cooper M.D.

## 2020-12-16 DIAGNOSIS — F33.41 RECURRENT MAJOR DEPRESSIVE DISORDER, IN PARTIAL REMISSION (HCC): ICD-10-CM

## 2020-12-16 RX ORDER — FLUOXETINE HYDROCHLORIDE 20 MG/1
CAPSULE ORAL
Qty: 90 CAP | Refills: 3 | Status: SHIPPED | OUTPATIENT
Start: 2020-12-16 | End: 2022-06-15

## 2021-01-11 DIAGNOSIS — Z23 NEED FOR VACCINATION: ICD-10-CM

## 2021-01-20 ENCOUNTER — APPOINTMENT (RX ONLY)
Dept: URBAN - METROPOLITAN AREA CLINIC 35 | Facility: CLINIC | Age: 81
Setting detail: DERMATOLOGY
End: 2021-01-20

## 2021-01-20 DIAGNOSIS — Z41.9 ENCOUNTER FOR PROCEDURE FOR PURPOSES OTHER THAN REMEDYING HEALTH STATE, UNSPECIFIED: ICD-10-CM

## 2021-01-20 DIAGNOSIS — L82.1 OTHER SEBORRHEIC KERATOSIS: ICD-10-CM

## 2021-01-20 DIAGNOSIS — Z71.89 OTHER SPECIFIED COUNSELING: ICD-10-CM

## 2021-01-20 DIAGNOSIS — Z85.828 PERSONAL HISTORY OF OTHER MALIGNANT NEOPLASM OF SKIN: ICD-10-CM

## 2021-01-20 DIAGNOSIS — L57.0 ACTINIC KERATOSIS: ICD-10-CM

## 2021-01-20 DIAGNOSIS — L81.8 OTHER SPECIFIED DISORDERS OF PIGMENTATION: ICD-10-CM

## 2021-01-20 DIAGNOSIS — L81.4 OTHER MELANIN HYPERPIGMENTATION: ICD-10-CM

## 2021-01-20 PROCEDURE — ? SUNSCREEN RECOMMENDATIONS

## 2021-01-20 PROCEDURE — ? LIQUID NITROGEN

## 2021-01-20 PROCEDURE — ? COUNSELING

## 2021-01-20 PROCEDURE — 99213 OFFICE O/P EST LOW 20 MIN: CPT | Mod: 25

## 2021-01-20 PROCEDURE — 17004 DESTROY PREMAL LESIONS 15/>: CPT

## 2021-01-20 PROCEDURE — ? FILLERS

## 2021-01-20 PROCEDURE — ? BENIGN DESTRUCTION

## 2021-01-20 ASSESSMENT — LOCATION SIMPLE DESCRIPTION DERM
LOCATION SIMPLE: RIGHT CHEEK
LOCATION SIMPLE: RIGHT POSTERIOR UPPER ARM
LOCATION SIMPLE: LEFT UPPER ARM
LOCATION SIMPLE: LEFT PRETIBIAL REGION
LOCATION SIMPLE: RIGHT BREAST
LOCATION SIMPLE: CHEST
LOCATION SIMPLE: LEFT FOREARM
LOCATION SIMPLE: NOSE
LOCATION SIMPLE: RIGHT PRETIBIAL REGION
LOCATION SIMPLE: LEFT SHOULDER
LOCATION SIMPLE: LEFT UPPER BACK
LOCATION SIMPLE: LEFT HAND
LOCATION SIMPLE: RIGHT EYEBROW
LOCATION SIMPLE: LEFT FOREHEAD
LOCATION SIMPLE: RIGHT FOREARM
LOCATION SIMPLE: LEFT ZYGOMA
LOCATION SIMPLE: GLABELLA
LOCATION SIMPLE: LEFT CHEEK
LOCATION SIMPLE: RIGHT FOREHEAD
LOCATION SIMPLE: ABDOMEN

## 2021-01-20 ASSESSMENT — LOCATION DETAILED DESCRIPTION DERM
LOCATION DETAILED: LEFT ANTECUBITAL SKIN
LOCATION DETAILED: GLABELLA
LOCATION DETAILED: MIDDLE STERNUM
LOCATION DETAILED: RIGHT INFERIOR CENTRAL MALAR CHEEK
LOCATION DETAILED: LEFT CENTRAL ZYGOMA
LOCATION DETAILED: LEFT POSTERIOR SHOULDER
LOCATION DETAILED: EPIGASTRIC SKIN
LOCATION DETAILED: LEFT LATERAL SUPERIOR CHEST
LOCATION DETAILED: RIGHT INFERIOR MEDIAL FOREHEAD
LOCATION DETAILED: RIGHT PROXIMAL LATERAL POSTERIOR UPPER ARM
LOCATION DETAILED: LEFT PROXIMAL DORSAL FOREARM
LOCATION DETAILED: RIGHT PROXIMAL DORSAL FOREARM
LOCATION DETAILED: LEFT FOREHEAD
LOCATION DETAILED: RIGHT PROXIMAL PRETIBIAL REGION
LOCATION DETAILED: LEFT CENTRAL BUCCAL CHEEK
LOCATION DETAILED: LEFT MEDIAL DISTAL PRETIBIAL REGION
LOCATION DETAILED: LEFT SUPERIOR UPPER BACK
LOCATION DETAILED: LEFT INFERIOR CENTRAL MALAR CHEEK
LOCATION DETAILED: RIGHT LATERAL SUPERIOR CHEST
LOCATION DETAILED: NASAL DORSUM
LOCATION DETAILED: LEFT RADIAL DORSAL HAND
LOCATION DETAILED: RIGHT MEDIAL SUPERIOR CHEST
LOCATION DETAILED: RIGHT LATERAL EYEBROW
LOCATION DETAILED: RIGHT CENTRAL MALAR CHEEK
LOCATION DETAILED: RIGHT MEDIAL DISTAL PRETIBIAL REGION
LOCATION DETAILED: RIGHT LATERAL BREAST 6-7:00 REGION
LOCATION DETAILED: LEFT MEDIAL SUPERIOR CHEST

## 2021-01-20 ASSESSMENT — LOCATION ZONE DERM
LOCATION ZONE: NOSE
LOCATION ZONE: FACE
LOCATION ZONE: LEG
LOCATION ZONE: TRUNK
LOCATION ZONE: HAND
LOCATION ZONE: ARM

## 2021-01-20 NOTE — PROCEDURE: BENIGN DESTRUCTION
Consent: The patient's consent was obtained including but not limited to risks of crusting, scabbing, blistering, scarring, darker or lighter pigmentary change, recurrence, incomplete removal and infection.
Detail Level: Detailed
Render Note In Bullet Format When Appropriate: No
Include Z78.9 (Other Specified Conditions Influencing Health Status) As An Associated Diagnosis?: Yes
Post-Care Instructions: I reviewed with the patient in detail post-care instructions. Patient is to wear sunprotection, and avoid picking at any of the treated lesions. Pt may apply Vaseline to crusted or scabbing areas.
Medical Necessity Information: It is in your best interest to select a reason for this procedure from the list below. All of these items fulfill various CMS LCD requirements except the new and changing color options.
Medical Necessity Clause: This procedure was medically necessary because the lesions that were treated were:

## 2021-01-20 NOTE — PROCEDURE: FILLERS
[place] Lines Filler Volume In Cc: 0
Additional Area 3 Location: Fine lines around mouth
Additional Area 2 Location: Border of lips
Include Cannula Information In Note?: No
Additional Area 5 Location: Earlobes
Additional Area 1 Location: Oral Commisures
Expiration Date (Month Year): 10/24/21
Map Statment: See Attach Map for Complete Details
Additional Area 4 Location: Scars
Use Map Statement For Sites (Optional): Yes
Detail Level: Detailed
Price (Use Numbers Only, No Special Characters Or $): 781
Post-Care Instructions: Patient instructed to apply ice to reduce swelling.
Lot #: [id number]
Consent: Written consent obtained. Risks include but not limited to bruising, bleeding, blindness, stroke, delayed onset of nodules, irregular texture, ulceration, infection, allergic reaction, scar formation, incomplete augmentation, temporary nature, procedural pain.
Filler: Juvederm Ultra XC

## 2021-02-09 DIAGNOSIS — G44.229 CHRONIC TENSION-TYPE HEADACHE, NOT INTRACTABLE: ICD-10-CM

## 2021-02-09 RX ORDER — CODEINE/BUTALBITAL/ASA/CAFFEIN 30-50-325
1 CAPSULE ORAL EVERY 6 HOURS PRN
Qty: 60 CAP | Refills: 0 | Status: CANCELLED | OUTPATIENT
Start: 2021-02-09 | End: 2021-03-11

## 2021-04-01 ENCOUNTER — APPOINTMENT (RX ONLY)
Dept: URBAN - METROPOLITAN AREA CLINIC 35 | Facility: CLINIC | Age: 81
Setting detail: DERMATOLOGY
End: 2021-04-01

## 2021-04-01 DIAGNOSIS — Z41.9 ENCOUNTER FOR PROCEDURE FOR PURPOSES OTHER THAN REMEDYING HEALTH STATE, UNSPECIFIED: ICD-10-CM

## 2021-04-01 PROCEDURE — ? FILLERS

## 2021-04-01 PROCEDURE — ? BOTOX

## 2021-04-01 NOTE — PROCEDURE: BOTOX
Forehead Units: 0
Expiration Date (Month Year): 8/23
Post-Care Instructions: Patient instructed to not lie down for 4 hours after injections and limit physical activity for 24 hours.
Lot #: [id number]
Additional Area 3 Location: [place]
Additional Area 5 Location: perioral
Consent: Verbal and written informed consent were obtained to include the following risks: pain, swelling, bruising, eyelid or eyebrow droop, and lack of visible improvement of wrinkles in the areas treated.  The skin was cleansed with alcohol. Injections were administered with a 32g needle into the following areas:
Reconstitution Date (Optional): 4/1/21
Dilution (U/0.1 Cc): 1.1
Additional Area 2 Units: 34
Price (Use Numbers Only, No Special Characters Or $): 911
Additional Area 2 Location: [place]
Additional Area 6 Location: platysma
Additional Area 4 Location: Bunny lines
Detail Level: Detailed
Additional Area 1 Location: [place]
Additional Area 1 Units: 16

## 2021-04-01 NOTE — PROCEDURE: FILLERS
Additional Area 5 Volume In Cc: 0
Filler: Juvederm Voluma XC
Expiration Date (Month Year): 3/12/22
Additional Area 5 Location: Earlobes
Include Cannula Information In Note?: No
Include Cannula Size?: 25G
Additional Area 1 Location: Oral Commisures
Filler: Juvederm Ultra XC
Additional Area 2 Location: Border of lips
Filler Comments: Syringe #1\\nC1-0.2cc per side\\nC2-0.3cc per side \\n\\nSyringe #2\\nJW4&5- 0.5 cc per side
Additional Area 4 Location: Scars
Consent: Written consent obtained. Risks include but not limited to bruising, bleeding, blindness, stroke, delayed onset of nodules, irregular texture, ulceration, infection, allergic reaction, scar formation, incomplete augmentation, temporary nature, procedural pain.
Post-Care Instructions: Patient instructed to apply ice to reduce swelling.
Price (Use Numbers Only, No Special Characters Or $): 2050
Additional Area 3 Location: Fine lines around mouth
Expiration Date (Month Year): 1/31/22
Lot #: [id number]
Include Cannula Information In Note?: Yes
Detail Level: Detailed
Lot #: [id number]
Map Statment: See Attach Map for Complete Details
Include Cannula Length?: 1.5 inch

## 2021-04-13 ENCOUNTER — APPOINTMENT (RX ONLY)
Dept: URBAN - METROPOLITAN AREA CLINIC 35 | Facility: CLINIC | Age: 81
Setting detail: DERMATOLOGY
End: 2021-04-13

## 2021-04-13 DIAGNOSIS — Z41.9 ENCOUNTER FOR PROCEDURE FOR PURPOSES OTHER THAN REMEDYING HEALTH STATE, UNSPECIFIED: ICD-10-CM

## 2021-04-13 PROCEDURE — ? FILLERS

## 2021-04-13 NOTE — PROCEDURE: FILLERS
Additional Area 1 Location: Oral Commisures
Jawline Filler Volume In Cc: 0
Additional Area 2 Location: Border of lips
Additional Area 4 Location: Scars
Use Map Statement For Sites (Optional): Yes
Additional Area 3 Location: Fine lines around mouth
Additional Area 5 Location: Earlobes
Include Cannula Size?: 25G
Post-Care Instructions: Patient instructed to apply ice to reduce swelling.
Include Cannula Information In Note?: No
Include Cannula Length?: 1.5 inch
Filler: Juvederm Voluma XC
Lot #: [id number]
Expiration Date (Month Year): 3/12/22
Map Statment: See Attach Map for Complete Details
Detail Level: Detailed
Consent: Written consent obtained. Risks include but not limited to bruising, bleeding, blindness, stroke, delayed onset of nodules, irregular texture, ulceration, infection, allergic reaction, scar formation, incomplete augmentation, temporary nature, procedural pain.
Filler Comments: C1-0.2 cc per side \\nC2-0.3 cc per side \\n\\nJW4&5- 0.5 cc per side
Price (Use Numbers Only, No Special Characters Or $): 1600

## 2021-05-19 ENCOUNTER — APPOINTMENT (RX ONLY)
Dept: URBAN - METROPOLITAN AREA CLINIC 35 | Facility: CLINIC | Age: 81
Setting detail: DERMATOLOGY
End: 2021-05-19

## 2021-05-19 DIAGNOSIS — L57.0 ACTINIC KERATOSIS: ICD-10-CM

## 2021-05-19 PROCEDURE — 17003 DESTRUCT PREMALG LES 2-14: CPT

## 2021-05-19 PROCEDURE — 17000 DESTRUCT PREMALG LESION: CPT

## 2021-05-19 PROCEDURE — ? COUNSELING

## 2021-05-19 PROCEDURE — ? LIQUID NITROGEN

## 2021-05-19 ASSESSMENT — LOCATION ZONE DERM
LOCATION ZONE: FACE
LOCATION ZONE: HAND
LOCATION ZONE: ARM
LOCATION ZONE: NOSE
LOCATION ZONE: TRUNK

## 2021-05-19 ASSESSMENT — LOCATION SIMPLE DESCRIPTION DERM
LOCATION SIMPLE: LEFT FOREHEAD
LOCATION SIMPLE: RIGHT UPPER BACK
LOCATION SIMPLE: LEFT WRIST
LOCATION SIMPLE: NOSE
LOCATION SIMPLE: LEFT HAND
LOCATION SIMPLE: RIGHT TEMPLE
LOCATION SIMPLE: LEFT CHEEK
LOCATION SIMPLE: RIGHT FOREHEAD
LOCATION SIMPLE: RIGHT CHEEK

## 2021-05-19 ASSESSMENT — LOCATION DETAILED DESCRIPTION DERM
LOCATION DETAILED: RIGHT CENTRAL TEMPLE
LOCATION DETAILED: RIGHT SUPERIOR LATERAL MALAR CHEEK
LOCATION DETAILED: LEFT DORSAL MIDDLE FINGER METACARPOPHALANGEAL JOINT
LOCATION DETAILED: RIGHT INFERIOR FOREHEAD
LOCATION DETAILED: LEFT NASAL DORSUM
LOCATION DETAILED: LEFT MEDIAL MALAR CHEEK
LOCATION DETAILED: LEFT FOREHEAD
LOCATION DETAILED: LEFT INFERIOR LATERAL MALAR CHEEK
LOCATION DETAILED: LEFT LATERAL DORSAL WRIST
LOCATION DETAILED: LEFT INFERIOR CENTRAL MALAR CHEEK
LOCATION DETAILED: NASAL ROOT
LOCATION DETAILED: RIGHT SUPERIOR UPPER BACK
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
## 2021-06-27 ENCOUNTER — HOSPITAL ENCOUNTER (OUTPATIENT)
Dept: RADIOLOGY | Facility: MEDICAL CENTER | Age: 81
End: 2021-06-27
Attending: FAMILY MEDICINE
Payer: MEDICARE

## 2021-06-27 DIAGNOSIS — R91.8 PULMONARY NODULES: ICD-10-CM

## 2021-06-27 DIAGNOSIS — I71.21 ASCENDING AORTIC ANEURYSM (HCC): ICD-10-CM

## 2021-06-27 PROCEDURE — 71250 CT THORAX DX C-: CPT | Mod: MH

## 2021-06-29 ENCOUNTER — HOSPITAL ENCOUNTER (OUTPATIENT)
Dept: LAB | Facility: MEDICAL CENTER | Age: 81
End: 2021-06-29
Attending: FAMILY MEDICINE
Payer: MEDICARE

## 2021-06-29 LAB
ALBUMIN SERPL BCP-MCNC: 3.9 G/DL (ref 3.2–4.9)
ALBUMIN/GLOB SERPL: 1.3 G/DL
ALP SERPL-CCNC: 81 U/L (ref 30–99)
ALT SERPL-CCNC: 15 U/L (ref 2–50)
ANION GAP SERPL CALC-SCNC: 9 MMOL/L (ref 7–16)
AST SERPL-CCNC: 20 U/L (ref 12–45)
BASOPHILS # BLD AUTO: 1.2 % (ref 0–1.8)
BASOPHILS # BLD: 0.06 K/UL (ref 0–0.12)
BILIRUB SERPL-MCNC: 0.2 MG/DL (ref 0.1–1.5)
BUN SERPL-MCNC: 14 MG/DL (ref 8–22)
CALCIUM SERPL-MCNC: 9.2 MG/DL (ref 8.5–10.5)
CHLORIDE SERPL-SCNC: 102 MMOL/L (ref 96–112)
CHOLEST SERPL-MCNC: 206 MG/DL (ref 100–199)
CO2 SERPL-SCNC: 25 MMOL/L (ref 20–33)
CREAT SERPL-MCNC: 0.67 MG/DL (ref 0.5–1.4)
EOSINOPHIL # BLD AUTO: 0.15 K/UL (ref 0–0.51)
EOSINOPHIL NFR BLD: 2.9 % (ref 0–6.9)
ERYTHROCYTE [DISTWIDTH] IN BLOOD BY AUTOMATED COUNT: 42.1 FL (ref 35.9–50)
FASTING STATUS PATIENT QL REPORTED: NORMAL
GLOBULIN SER CALC-MCNC: 2.9 G/DL (ref 1.9–3.5)
GLUCOSE SERPL-MCNC: 90 MG/DL (ref 65–99)
HCT VFR BLD AUTO: 40.3 % (ref 37–47)
HDLC SERPL-MCNC: 66 MG/DL
HGB BLD-MCNC: 13.3 G/DL (ref 12–16)
IMM GRANULOCYTES # BLD AUTO: 0.01 K/UL (ref 0–0.11)
IMM GRANULOCYTES NFR BLD AUTO: 0.2 % (ref 0–0.9)
LDLC SERPL CALC-MCNC: 125 MG/DL
LYMPHOCYTES # BLD AUTO: 1.38 K/UL (ref 1–4.8)
LYMPHOCYTES NFR BLD: 27 % (ref 22–41)
MCH RBC QN AUTO: 32 PG (ref 27–33)
MCHC RBC AUTO-ENTMCNC: 33 G/DL (ref 33.6–35)
MCV RBC AUTO: 97.1 FL (ref 81.4–97.8)
MONOCYTES # BLD AUTO: 0.45 K/UL (ref 0–0.85)
MONOCYTES NFR BLD AUTO: 8.8 % (ref 0–13.4)
NEUTROPHILS # BLD AUTO: 3.07 K/UL (ref 2–7.15)
NEUTROPHILS NFR BLD: 59.9 % (ref 44–72)
NRBC # BLD AUTO: 0 K/UL
NRBC BLD-RTO: 0 /100 WBC
PLATELET # BLD AUTO: 294 K/UL (ref 164–446)
PMV BLD AUTO: 9.2 FL (ref 9–12.9)
POTASSIUM SERPL-SCNC: 4.4 MMOL/L (ref 3.6–5.5)
PROT SERPL-MCNC: 6.8 G/DL (ref 6–8.2)
RBC # BLD AUTO: 4.15 M/UL (ref 4.2–5.4)
SODIUM SERPL-SCNC: 136 MMOL/L (ref 135–145)
T4 FREE SERPL-MCNC: 1.63 NG/DL (ref 0.93–1.7)
TRIGL SERPL-MCNC: 73 MG/DL (ref 0–149)
TSH SERPL DL<=0.005 MIU/L-ACNC: 0.1 UIU/ML (ref 0.38–5.33)
WBC # BLD AUTO: 5.1 K/UL (ref 4.8–10.8)

## 2021-06-29 PROCEDURE — 84443 ASSAY THYROID STIM HORMONE: CPT

## 2021-06-29 PROCEDURE — 84439 ASSAY OF FREE THYROXINE: CPT

## 2021-06-29 PROCEDURE — 80053 COMPREHEN METABOLIC PANEL: CPT

## 2021-06-29 PROCEDURE — 36415 COLL VENOUS BLD VENIPUNCTURE: CPT

## 2021-06-29 PROCEDURE — 80061 LIPID PANEL: CPT

## 2021-06-29 PROCEDURE — 85025 COMPLETE CBC W/AUTO DIFF WBC: CPT

## 2021-07-13 ENCOUNTER — APPOINTMENT (OUTPATIENT)
Dept: RADIOLOGY | Facility: MEDICAL CENTER | Age: 81
End: 2021-07-13
Attending: SURGERY
Payer: MEDICARE

## 2021-07-22 ENCOUNTER — APPOINTMENT (RX ONLY)
Dept: URBAN - METROPOLITAN AREA CLINIC 35 | Facility: CLINIC | Age: 81
Setting detail: DERMATOLOGY
End: 2021-07-22

## 2021-07-22 DIAGNOSIS — Z41.9 ENCOUNTER FOR PROCEDURE FOR PURPOSES OTHER THAN REMEDYING HEALTH STATE, UNSPECIFIED: ICD-10-CM

## 2021-07-22 PROCEDURE — ? FILLERS

## 2021-07-22 NOTE — PROCEDURE: FILLERS
Jawline Filler Volume In Cc: 0
Include Cannula Size?: 25G
Additional Area 4 Location: Scars
Additional Area 5 Location: Earlobes
Detail Level: Detailed
Additional Area 1 Location: Oral Commisures
Filler Comments: C1-0.1 cc per side \\nJW4&5-0.4 cc per side
Use Map Statement For Sites (Optional): Yes
Consent: Written consent obtained. Risks include but not limited to bruising, bleeding, blindness, stroke, delayed onset of nodules, irregular texture, ulceration, infection, allergic reaction, scar formation, incomplete augmentation, temporary nature, procedural pain.
Additional Area 3 Location: Fine lines around mouth
Post-Care Instructions: Patient instructed to apply ice to reduce swelling.
Include Cannula Information In Note?: No
Additional Area 2 Location: Border of lips
Lot #: [id number]
Include Cannula Length?: 1.5 inch
Price (Use Numbers Only, No Special Characters Or $): 497
Expiration Date (Month Year): 4/21/22
Filler: Juvederm Voluma XC
Map Statment: See Attach Map for Complete Details

## 2021-08-03 ENCOUNTER — APPOINTMENT (RX ONLY)
Dept: URBAN - METROPOLITAN AREA CLINIC 35 | Facility: CLINIC | Age: 81
Setting detail: DERMATOLOGY
End: 2021-08-03

## 2021-08-03 DIAGNOSIS — Z41.9 ENCOUNTER FOR PROCEDURE FOR PURPOSES OTHER THAN REMEDYING HEALTH STATE, UNSPECIFIED: ICD-10-CM

## 2021-08-03 PROCEDURE — ? FILLERS

## 2021-08-03 NOTE — PROCEDURE: FILLERS
Additional Area 2 Volume In Cc: 0
Include Cannula Information In Note?: No
Price (Use Numbers Only, No Special Characters Or $): 431
Additional Area 2 Location: Border of lips
Additional Area 3 Location: Fine lines around mouth
Additional Area 1 Location: Oral Commisures
Additional Area 4 Location: Scars
Map Statment: See Attach Map for Complete Details
Consent: Written consent obtained. Risks include but not limited to bruising, bleeding, blindness, stroke, delayed onset of nodules, irregular texture, ulceration, infection, allergic reaction, scar formation, incomplete augmentation, temporary nature, procedural pain.
Additional Area 5 Location: Earlobes
Post-Care Instructions: Patient instructed to apply ice to reduce swelling.
Use Map Statement For Sites (Optional): Yes
Detail Level: Detailed
Lot #: [id number]
Expiration Date (Month Year): 4/19/22
Vermilion Lips Filler Volume In Cc: 0.7
Filler: Juvederm Ultra XC
Additional Area 1 Volume In Cc: 0.3
Filler Comments: Removed some surface bleps in the mental crease with a 11 blade

## 2021-08-16 ENCOUNTER — APPOINTMENT (RX ONLY)
Dept: URBAN - METROPOLITAN AREA CLINIC 35 | Facility: CLINIC | Age: 81
Setting detail: DERMATOLOGY
End: 2021-08-16

## 2021-08-16 DIAGNOSIS — L82.1 OTHER SEBORRHEIC KERATOSIS: ICD-10-CM

## 2021-08-16 DIAGNOSIS — Z85.828 PERSONAL HISTORY OF OTHER MALIGNANT NEOPLASM OF SKIN: ICD-10-CM

## 2021-08-16 DIAGNOSIS — D18.0 HEMANGIOMA: ICD-10-CM

## 2021-08-16 DIAGNOSIS — Z71.89 OTHER SPECIFIED COUNSELING: ICD-10-CM

## 2021-08-16 DIAGNOSIS — L81.4 OTHER MELANIN HYPERPIGMENTATION: ICD-10-CM

## 2021-08-16 DIAGNOSIS — L57.0 ACTINIC KERATOSIS: ICD-10-CM

## 2021-08-16 DIAGNOSIS — L81.8 OTHER SPECIFIED DISORDERS OF PIGMENTATION: ICD-10-CM

## 2021-08-16 DIAGNOSIS — D22 MELANOCYTIC NEVI: ICD-10-CM

## 2021-08-16 PROBLEM — D18.01 HEMANGIOMA OF SKIN AND SUBCUTANEOUS TISSUE: Status: ACTIVE | Noted: 2021-08-16

## 2021-08-16 PROBLEM — D22.61 MELANOCYTIC NEVI OF RIGHT UPPER LIMB, INCLUDING SHOULDER: Status: ACTIVE | Noted: 2021-08-16

## 2021-08-16 PROCEDURE — 17003 DESTRUCT PREMALG LES 2-14: CPT

## 2021-08-16 PROCEDURE — ? SUNSCREEN RECOMMENDATIONS

## 2021-08-16 PROCEDURE — 17000 DESTRUCT PREMALG LESION: CPT

## 2021-08-16 PROCEDURE — ? LIQUID NITROGEN

## 2021-08-16 PROCEDURE — ? COUNSELING

## 2021-08-16 PROCEDURE — 99213 OFFICE O/P EST LOW 20 MIN: CPT | Mod: 25

## 2021-08-16 ASSESSMENT — LOCATION ZONE DERM
LOCATION ZONE: NOSE
LOCATION ZONE: ARM
LOCATION ZONE: TRUNK
LOCATION ZONE: FACE
LOCATION ZONE: LEG

## 2021-08-16 ASSESSMENT — LOCATION DETAILED DESCRIPTION DERM
LOCATION DETAILED: RIGHT INFERIOR MEDIAL FOREHEAD
LOCATION DETAILED: LEFT POSTERIOR SHOULDER
LOCATION DETAILED: RIGHT PROXIMAL LATERAL POSTERIOR UPPER ARM
LOCATION DETAILED: LEFT DISTAL POSTERIOR UPPER ARM
LOCATION DETAILED: NASAL DORSUM
LOCATION DETAILED: RIGHT CENTRAL MALAR CHEEK
LOCATION DETAILED: RIGHT MEDIAL SUPERIOR CHEST
LOCATION DETAILED: RIGHT MEDIAL DISTAL PRETIBIAL REGION
LOCATION DETAILED: LEFT MEDIAL BREAST 10-11:00 REGION
LOCATION DETAILED: RIGHT LATERAL SUPERIOR CHEST
LOCATION DETAILED: LEFT MEDIAL DISTAL PRETIBIAL REGION
LOCATION DETAILED: RIGHT FOREHEAD
LOCATION DETAILED: LEFT SUPERIOR FOREHEAD
LOCATION DETAILED: LEFT SUPERIOR UPPER BACK
LOCATION DETAILED: RIGHT POSTERIOR SHOULDER
LOCATION DETAILED: RIGHT PROXIMAL PRETIBIAL REGION
LOCATION DETAILED: LEFT MEDIAL SUPERIOR CHEST
LOCATION DETAILED: RIGHT LATERAL PROXIMAL UPPER ARM
LOCATION DETAILED: RIGHT LATERAL BREAST 6-7:00 REGION

## 2021-08-16 ASSESSMENT — LOCATION SIMPLE DESCRIPTION DERM
LOCATION SIMPLE: LEFT FOREHEAD
LOCATION SIMPLE: LEFT SHOULDER
LOCATION SIMPLE: RIGHT UPPER ARM
LOCATION SIMPLE: RIGHT BREAST
LOCATION SIMPLE: RIGHT SHOULDER
LOCATION SIMPLE: CHEST
LOCATION SIMPLE: NOSE
LOCATION SIMPLE: LEFT PRETIBIAL REGION
LOCATION SIMPLE: LEFT POSTERIOR UPPER ARM
LOCATION SIMPLE: RIGHT CHEEK
LOCATION SIMPLE: RIGHT POSTERIOR UPPER ARM
LOCATION SIMPLE: RIGHT FOREHEAD
LOCATION SIMPLE: LEFT BREAST
LOCATION SIMPLE: RIGHT PRETIBIAL REGION
LOCATION SIMPLE: LEFT UPPER BACK

## 2021-08-16 NOTE — PROCEDURE: LIQUID NITROGEN
Number Of Freeze-Thaw Cycles: 2 freeze-thaw cycles
Render Note In Bullet Format When Appropriate: No
Detail Level: Detailed
Show Applicator Variable?: Yes
Duration Of Freeze Thaw-Cycle (Seconds): 2
Consent: The patient's consent was obtained including but not limited to risks of crusting, scabbing, blistering, scarring, darker or lighter pigmentary change, recurrence, incomplete removal and infection.
Post-Care Instructions: I reviewed with the patient in detail post-care instructions. Patient is to wear sunprotection, and avoid picking at any of the treated lesions. Pt may apply Vaseline to crusted or scabbing areas.

## 2021-08-16 NOTE — PROCEDURE: MIPS QUALITY
Quality 402: Tobacco Use And Help With Quitting Among Adolescents: Patient screened for tobacco and never smoked
Quality 226: Preventive Care And Screening: Tobacco Use: Screening And Cessation Intervention: Patient screened for tobacco use and is an ex/non-smoker
Detail Level: Detailed
Quality 111:Pneumonia Vaccination Status For Older Adults: Pneumococcal Vaccination Previously Received
Quality 130: Documentation Of Current Medications In The Medical Record: Current Medications Documented

## 2021-09-01 ENCOUNTER — APPOINTMENT (RX ONLY)
Dept: URBAN - METROPOLITAN AREA CLINIC 35 | Facility: CLINIC | Age: 81
Setting detail: DERMATOLOGY
End: 2021-09-01

## 2021-09-01 DIAGNOSIS — Z41.9 ENCOUNTER FOR PROCEDURE FOR PURPOSES OTHER THAN REMEDYING HEALTH STATE, UNSPECIFIED: ICD-10-CM

## 2021-09-01 PROCEDURE — ? BOTOX

## 2021-09-01 PROCEDURE — ? FILLERS

## 2021-09-01 NOTE — PROCEDURE: FILLERS
[place] Filler Volume In Cc: 0
Additional Area 3 Location: Fine lines around mouth
Additional Area 5 Location: Earlobes
Include Cannula Information In Note?: Yes
Additional Area 2 Location: Border of lips
Additional Area 4 Location: Scars
Include Cannula Information In Note?: No
Additional Area 1 Location: Oral Commisures
Expiration Date (Month Year): 7/10/22
Detail Level: Detailed
Map Statment: See Attach Map for Complete Details
Include Cannula Length?: 1.5 inch
Lot #: [id number]
Filler: Juvederm Voluma XC
Include Cannula Size?: 25G
Consent: Written consent obtained. Risks include but not limited to bruising, bleeding, blindness, stroke, delayed onset of nodules, irregular texture, ulceration, infection, allergic reaction, scar formation, incomplete augmentation, temporary nature, procedural pain.
Filler Comments: Right C6-0.3cc \\nLeft C6-0.1 cc\\nJW4&5-0.3 cc per side
Price (Use Numbers Only, No Special Characters Or $): 524
Post-Care Instructions: Patient instructed to apply ice to reduce swelling.

## 2021-09-26 NOTE — PROCEDURE: BOTOX
Forehead Units: 0
Expiration Date (Month Year): 11/23
Post-Care Instructions: Patient instructed to not lie down for 4 hours after injections and limit physical activity for 24 hours.
Lot #: [id number]
Additional Area 3 Location: [place]
Additional Area 5 Location: perioral
Consent: Verbal and written informed consent were obtained to include the following risks: pain, swelling, bruising, eyelid or eyebrow droop, and lack of visible improvement of wrinkles in the areas treated.  The skin was cleansed with alcohol. Injections were administered with a 32g needle into the following areas:
Reconstitution Date (Optional): 9/1/21
Dilution (U/0.1 Cc): 1.1
Additional Area 2 Units: 34
Price (Use Numbers Only, No Special Characters Or $): 773
Additional Area 2 Location: [place]
Additional Area 6 Location: platysma
Additional Area 4 Location: Bunny lines
Detail Level: Detailed
Additional Area 1 Location: [place]
Additional Area 1 Units: 16
<-- Click to add NO pertinent Family History

## 2021-10-06 ENCOUNTER — APPOINTMENT (RX ONLY)
Dept: URBAN - METROPOLITAN AREA CLINIC 35 | Facility: CLINIC | Age: 81
Setting detail: DERMATOLOGY
End: 2021-10-06

## 2021-10-06 DIAGNOSIS — Z41.9 ENCOUNTER FOR PROCEDURE FOR PURPOSES OTHER THAN REMEDYING HEALTH STATE, UNSPECIFIED: ICD-10-CM

## 2021-10-06 PROCEDURE — ? FILLERS

## 2021-10-06 PROCEDURE — ? ADDITIONAL NOTES

## 2021-10-06 NOTE — PROCEDURE: FILLERS
Additional Area 5 Location: Earlobes
Tear Troughs Filler Volume In Cc: 0
Additional Area 4 Location: Scars
Include Cannula Information In Note?: No
Consent: Written consent obtained. Risks include but not limited to bruising, bleeding, blindness, stroke, delayed onset of nodules, irregular texture, ulceration, infection, allergic reaction, scar formation, incomplete augmentation, temporary nature, procedural pain.
Use Map Statement For Sites (Optional): Yes
Additional Area 1 Volume In Cc: 0.3
Additional Area 3 Location: Fine lines around mouth
Additional Area 2 Location: Border of lips
Expiration Date (Month Year): 4/30/22
Filler: Juvederm Ultra XC
Include Cannula Length?: 1.5 inch
Expiration Date (Month Year): 8/25/22
Additional Area 1 Location: Oral Commisures
Include Cannula Size?: 25G
Lot #: [id number]
Filler: Juvederm Voluma XC
Filler Comments: 0.2 cc upper lip\\n0.3 cc lower lip
Nasolabial Folds Filler Volume In Cc: 0.2
Price (Use Numbers Only, No Special Characters Or $): 1400
Map Statment: See Attach Map for Complete Details
Filler Comments: Right CK2-0.2 cc \\nRight CK4- 0.3 cc \\n\\nLeft CK2-0.2 cc \\nLeft CK4-0.3 cc
Detail Level: Detailed
Lot #: [id number]
Post-Care Instructions: Patient instructed to apply ice to reduce swelling.

## 2021-10-06 NOTE — PROCEDURE: ADDITIONAL NOTES
Additional Notes: Removed blebs of  chin with a 11 blade.
Render Risk Assessment In Note?: no
Detail Level: Detailed

## 2021-11-23 ENCOUNTER — APPOINTMENT (RX ONLY)
Dept: URBAN - METROPOLITAN AREA CLINIC 35 | Facility: CLINIC | Age: 81
Setting detail: DERMATOLOGY
End: 2021-11-23

## 2021-11-23 PROCEDURE — ? BOTOX

## 2021-11-23 PROCEDURE — ? FILLERS

## 2021-11-24 DIAGNOSIS — Z41.9 ENCOUNTER FOR PROCEDURE FOR PURPOSES OTHER THAN REMEDYING HEALTH STATE, UNSPECIFIED: ICD-10-CM

## 2021-11-24 NOTE — PROCEDURE: FILLERS
Brows Filler Volume In Cc: 0
Include Cannula Information In Note?: No
Additional Area 1 Location: Oral Commisures
Additional Area 3 Location: Fine lines around mouth
Expiration Date (Month Year): 11/1/22
Filler Comments: 0.6 cc fanning with cannula right cheek \\n0.4 cc fanning with cannula left cheek
Include Cannula Information In Note?: Yes
Additional Area 4 Location: Scars
Additional Area 5 Location: Earlobes
Include Cannula Length?: 1.5 inch
Include Cannula Size?: 25G
Map Statment: See Attach Map for Complete Details
Consent: Written consent obtained. Risks include but not limited to bruising, bleeding, blindness, stroke, delayed onset of nodules, irregular texture, ulceration, infection, allergic reaction, scar formation, incomplete augmentation, temporary nature, procedural pain.
Additional Area 2 Location: Border of lips
Price (Use Numbers Only, No Special Characters Or $): 521
Lot #: [id number]
Post-Care Instructions: Patient instructed to apply ice to reduce swelling.
Detail Level: Detailed
Filler: Juvederm Voluma XC

## 2021-11-24 NOTE — PROCEDURE: BOTOX
Forehead Units: 0
Expiration Date (Month Year): 4/24
Post-Care Instructions: Patient instructed to not lie down for 4 hours after injections and limit physical activity for 24 hours.
Lot #: [id number]
Additional Area 3 Location: [place]
Additional Area 5 Location: perioral
Consent: Verbal and written informed consent were obtained to include the following risks: pain, swelling, bruising, eyelid or eyebrow droop, and lack of visible improvement of wrinkles in the areas treated.  The skin was cleansed with alcohol. Injections were administered with a 32g needle into the following areas:
Reconstitution Date (Optional): 11/23/21
Dilution (U/0.1 Cc): 1.1
Additional Area 2 Units: 34
Price (Use Numbers Only, No Special Characters Or $): 190
Additional Area 2 Location: [place]
Additional Area 6 Location: platysma
Additional Area 4 Location: Bunny lines
Detail Level: Detailed
Additional Area 1 Location: [place]
Additional Area 1 Units: 16

## 2022-01-01 ENCOUNTER — OFFICE VISIT (OUTPATIENT)
Dept: URGENT CARE | Facility: CLINIC | Age: 82
End: 2022-01-01
Payer: MEDICARE

## 2022-01-01 ENCOUNTER — PATIENT MESSAGE (OUTPATIENT)
Dept: HEALTH INFORMATION MANAGEMENT | Facility: OTHER | Age: 82
End: 2022-01-01

## 2022-01-01 VITALS
HEART RATE: 89 BPM | SYSTOLIC BLOOD PRESSURE: 130 MMHG | DIASTOLIC BLOOD PRESSURE: 80 MMHG | TEMPERATURE: 97 F | BODY MASS INDEX: 18.62 KG/M2 | RESPIRATION RATE: 16 BRPM | HEIGHT: 67 IN | OXYGEN SATURATION: 95 % | WEIGHT: 118.6 LBS

## 2022-01-01 DIAGNOSIS — L08.9 SKIN INFECTION: ICD-10-CM

## 2022-01-01 PROCEDURE — 99213 OFFICE O/P EST LOW 20 MIN: CPT | Performed by: PHYSICIAN ASSISTANT

## 2022-01-01 RX ORDER — CEPHALEXIN 500 MG/1
500 CAPSULE ORAL 2 TIMES DAILY
Qty: 14 CAPSULE | Refills: 0 | Status: SHIPPED | OUTPATIENT
Start: 2022-01-01 | End: 2022-01-08

## 2022-01-01 ASSESSMENT — ENCOUNTER SYMPTOMS
CHILLS: 0
SHORTNESS OF BREATH: 0
CONSTIPATION: 0
SORE THROAT: 0
ABDOMINAL PAIN: 0
VOMITING: 0
HEADACHES: 0
DIARRHEA: 0
FEVER: 0
MYALGIAS: 0
COUGH: 0
NAUSEA: 0
EYE PAIN: 0

## 2022-01-01 ASSESSMENT — FIBROSIS 4 INDEX: FIB4 SCORE: 1.39

## 2022-01-01 NOTE — PROGRESS NOTES
"Subjective:   Afsaneh Lyn is a 81 y.o. female who presents for Spider Bite (sore, hot, x1 day )      Is a pleasant 81-year-old female who presents with a 1 day history of right antecubital irritation, pain, concerning for infection. She does not recall any trauma or injury. She speculates it could be a possible spider bite. She denies any IVDA or recent phlebotomy. She denies any fevers or chills. She denies itchiness, describes it is more painful. Apparently symptoms have begun in the last several hours.      Review of Systems   Constitutional: Negative for chills and fever.   HENT: Negative for congestion, ear pain and sore throat.    Eyes: Negative for pain.   Respiratory: Negative for cough and shortness of breath.    Cardiovascular: Negative for chest pain.   Gastrointestinal: Negative for abdominal pain, constipation, diarrhea, nausea and vomiting.   Genitourinary: Negative for dysuria.   Musculoskeletal: Negative for myalgias.   Skin: Negative for rash.   Neurological: Negative for headaches.       Medications, Allergies, and current problem list reviewed today in Epic.     Objective:     /80 (BP Location: Left arm, Patient Position: Sitting, BP Cuff Size: Adult long)   Pulse 89   Temp 36.1 °C (97 °F) (Temporal)   Resp 16   Ht 1.702 m (5' 7\")   Wt 53.8 kg (118 lb 9.6 oz)   SpO2 95%     Physical Exam  Vitals reviewed.   Constitutional:       Appearance: Normal appearance.   HENT:      Head: Normocephalic and atraumatic.      Right Ear: External ear normal.      Left Ear: External ear normal.      Nose: Nose normal.      Mouth/Throat:      Mouth: Mucous membranes are moist.   Eyes:      Conjunctiva/sclera: Conjunctivae normal.   Cardiovascular:      Rate and Rhythm: Normal rate.   Pulmonary:      Effort: Pulmonary effort is normal.   Skin:     General: Skin is warm and dry.      Capillary Refill: Capillary refill takes less than 2 seconds.      Comments: Right antecubital fossa 4 cm x 5 cm " area of patchy erythema, scaling, satellite macules, mildly warm and tender to the touch. No vesicles or fluctuance. No streaking. Nontender over the bony prominences of the elbow   Neurological:      Mental Status: She is alert and oriented to person, place, and time.         Assessment/Plan:     Diagnosis and associated orders:     1. Skin infection  cephALEXin (KEFLEX) 500 MG Cap    mupirocin (BACTROBAN) 2 % Ointment      Comments/MDM:     • Given the pain and warmth and redness appears more of a cellulitic infection will treat with Keflex and topical mupirocin. Recommend continued use of antihistamine, continue to monitor. If worsening or failing to improve after 48 hours of medications return for reevaluation. Return if any constitutional symptoms develop or any worsening joint pain         Differential diagnosis, natural history, supportive care, and indications for immediate follow-up discussed.    Advised the patient to follow-up with the primary care physician for recheck, reevaluation, and consideration of further management.    Please note that this dictation was created using voice recognition software. I have made a reasonable attempt to correct obvious errors, but I expect that there are errors of grammar and possibly content that I did not discover before finalizing the note.    This note was electronically signed by Jonathon Alejo PA-C

## 2022-01-04 ENCOUNTER — APPOINTMENT (RX ONLY)
Dept: URBAN - METROPOLITAN AREA CLINIC 35 | Facility: CLINIC | Age: 82
Setting detail: DERMATOLOGY
End: 2022-01-04

## 2022-01-04 DIAGNOSIS — Z41.9 ENCOUNTER FOR PROCEDURE FOR PURPOSES OTHER THAN REMEDYING HEALTH STATE, UNSPECIFIED: ICD-10-CM

## 2022-01-04 PROCEDURE — ? FILLERS

## 2022-01-04 NOTE — PROCEDURE: FILLERS
Additional Area 4 Location: Scars
Include Cannula Information In Note?: No
Mid Face Filler Volume In Cc: 0
Post-Care Instructions: Patient instructed to apply ice to reduce swelling.
Additional Area 1 Location: Oral Commisures
Filler: Juvederm Voluma XC
Additional Area 5 Location: Earlobes
Additional Area 2 Location: Border of lips
Additional Area 3 Location: Fine lines around mouth
Map Statment: See Attach Map for Complete Details
Consent: Written consent obtained. Risks include but not limited to bruising, bleeding, blindness, stroke, delayed onset of nodules, irregular texture, ulceration, infection, allergic reaction, scar formation, incomplete augmentation, temporary nature, procedural pain.
Use Map Statement For Sites (Optional): Yes
Detail Level: Detailed

## 2022-02-08 ENCOUNTER — APPOINTMENT (RX ONLY)
Dept: URBAN - METROPOLITAN AREA CLINIC 35 | Facility: CLINIC | Age: 82
Setting detail: DERMATOLOGY
End: 2022-02-08

## 2022-02-08 DIAGNOSIS — Z41.9 ENCOUNTER FOR PROCEDURE FOR PURPOSES OTHER THAN REMEDYING HEALTH STATE, UNSPECIFIED: ICD-10-CM

## 2022-02-08 PROCEDURE — ? FILLERS

## 2022-02-08 NOTE — PROCEDURE: FILLERS
Additional Area 1 Volume In Cc: 0
Map Statment: See Attach Map for Complete Details
Include Cannula Information In Note?: No
Lot #: [id number]
Expiration Date (Month Year): 2019.12.18
Detail Level: Detailed
Additional Anesthesia Volume In Cc: 6
Consent: Written consent obtained. Risks include but not limited to bruising, beading, irregular texture, ulceration, infection, allergic reaction, scar formation, incomplete augmentation, temporary nature, procedural pain.
Post-Care Instructions: Patient instructed to apply ice to reduce swelling.
Use Map Statement For Sites (Optional): Yes
Filler: Juvederm Ultra XC

## 2022-02-16 ENCOUNTER — APPOINTMENT (RX ONLY)
Dept: URBAN - METROPOLITAN AREA CLINIC 35 | Facility: CLINIC | Age: 82
Setting detail: DERMATOLOGY
End: 2022-02-16

## 2022-02-16 DIAGNOSIS — L81.4 OTHER MELANIN HYPERPIGMENTATION: ICD-10-CM

## 2022-02-16 DIAGNOSIS — L81.8 OTHER SPECIFIED DISORDERS OF PIGMENTATION: ICD-10-CM

## 2022-02-16 DIAGNOSIS — Z71.89 OTHER SPECIFIED COUNSELING: ICD-10-CM

## 2022-02-16 DIAGNOSIS — Z85.828 PERSONAL HISTORY OF OTHER MALIGNANT NEOPLASM OF SKIN: ICD-10-CM

## 2022-02-16 DIAGNOSIS — D18.0 HEMANGIOMA: ICD-10-CM

## 2022-02-16 DIAGNOSIS — L57.0 ACTINIC KERATOSIS: ICD-10-CM

## 2022-02-16 DIAGNOSIS — L82.1 OTHER SEBORRHEIC KERATOSIS: ICD-10-CM

## 2022-02-16 DIAGNOSIS — D22 MELANOCYTIC NEVI: ICD-10-CM

## 2022-02-16 PROBLEM — D22.61 MELANOCYTIC NEVI OF RIGHT UPPER LIMB, INCLUDING SHOULDER: Status: ACTIVE | Noted: 2022-02-16

## 2022-02-16 PROBLEM — D18.01 HEMANGIOMA OF SKIN AND SUBCUTANEOUS TISSUE: Status: ACTIVE | Noted: 2022-02-16

## 2022-02-16 PROCEDURE — ? SUNSCREEN RECOMMENDATIONS

## 2022-02-16 PROCEDURE — 17000 DESTRUCT PREMALG LESION: CPT

## 2022-02-16 PROCEDURE — 99213 OFFICE O/P EST LOW 20 MIN: CPT | Mod: 25

## 2022-02-16 PROCEDURE — ? COUNSELING

## 2022-02-16 PROCEDURE — ? LIQUID NITROGEN

## 2022-02-16 PROCEDURE — 17003 DESTRUCT PREMALG LES 2-14: CPT

## 2022-02-16 PROCEDURE — ? BENIGN DESTRUCTION

## 2022-02-16 ASSESSMENT — LOCATION DETAILED DESCRIPTION DERM
LOCATION DETAILED: RIGHT CENTRAL MALAR CHEEK
LOCATION DETAILED: RIGHT PROXIMAL LATERAL POSTERIOR UPPER ARM
LOCATION DETAILED: LEFT INFERIOR LATERAL NECK
LOCATION DETAILED: LEFT LATERAL SUPERIOR CHEST
LOCATION DETAILED: RIGHT LATERAL BREAST 6-7:00 REGION
LOCATION DETAILED: RIGHT NASAL ROOT
LOCATION DETAILED: LEFT SUPERIOR UPPER BACK
LOCATION DETAILED: LEFT INFERIOR CENTRAL MALAR CHEEK
LOCATION DETAILED: RIGHT POSTERIOR SHOULDER
LOCATION DETAILED: RIGHT LATERAL TEMPLE
LOCATION DETAILED: NASAL DORSUM
LOCATION DETAILED: LEFT SUPERIOR CENTRAL BUCCAL CHEEK
LOCATION DETAILED: LEFT MEDIAL SUPERIOR CHEST
LOCATION DETAILED: LEFT POSTERIOR SHOULDER
LOCATION DETAILED: LEFT CENTRAL TEMPLE
LOCATION DETAILED: RIGHT CLAVICULAR SKIN
LOCATION DETAILED: RIGHT MEDIAL DISTAL PRETIBIAL REGION
LOCATION DETAILED: RIGHT INFERIOR MEDIAL FOREHEAD
LOCATION DETAILED: LEFT CLAVICULAR NECK
LOCATION DETAILED: RIGHT CLAVICULAR NECK
LOCATION DETAILED: RIGHT PROXIMAL PRETIBIAL REGION
LOCATION DETAILED: RIGHT MEDIAL SUPERIOR CHEST
LOCATION DETAILED: RIGHT LATERAL SUPERIOR CHEST
LOCATION DETAILED: LEFT MEDIAL DISTAL PRETIBIAL REGION

## 2022-02-16 ASSESSMENT — LOCATION SIMPLE DESCRIPTION DERM
LOCATION SIMPLE: RIGHT FOREHEAD
LOCATION SIMPLE: RIGHT TEMPLE
LOCATION SIMPLE: RIGHT BREAST
LOCATION SIMPLE: CHEST
LOCATION SIMPLE: LEFT CHEEK
LOCATION SIMPLE: RIGHT SHOULDER
LOCATION SIMPLE: LEFT SHOULDER
LOCATION SIMPLE: NOSE
LOCATION SIMPLE: LEFT PRETIBIAL REGION
LOCATION SIMPLE: RIGHT CLAVICULAR SKIN
LOCATION SIMPLE: RIGHT POSTERIOR UPPER ARM
LOCATION SIMPLE: LEFT UPPER BACK
LOCATION SIMPLE: RIGHT CHEEK
LOCATION SIMPLE: LEFT TEMPLE
LOCATION SIMPLE: RIGHT PRETIBIAL REGION
LOCATION SIMPLE: LEFT ANTERIOR NECK
LOCATION SIMPLE: RIGHT ANTERIOR NECK

## 2022-02-16 ASSESSMENT — LOCATION ZONE DERM
LOCATION ZONE: TRUNK
LOCATION ZONE: LEG
LOCATION ZONE: FACE
LOCATION ZONE: NECK
LOCATION ZONE: NOSE
LOCATION ZONE: ARM

## 2022-02-16 NOTE — PROCEDURE: BENIGN DESTRUCTION
Render Note In Bullet Format When Appropriate: No
Consent: The patient's consent was obtained including but not limited to risks of crusting, scabbing, blistering, scarring, darker or lighter pigmentary change, recurrence, incomplete removal and infection.
Detail Level: Detailed
Medical Necessity Clause: This procedure was medically necessary because the lesions that were treated were:
Post-Care Instructions: I reviewed with the patient in detail post-care instructions. Patient is to wear sunprotection, and avoid picking at any of the treated lesions. Pt may apply Vaseline to crusted or scabbing areas.
Medical Necessity Information: It is in your best interest to select a reason for this procedure from the list below. All of these items fulfill various CMS LCD requirements except the new and changing color options.
Include Z78.9 (Other Specified Conditions Influencing Health Status) As An Associated Diagnosis?: Yes

## 2022-02-16 NOTE — PROCEDURE: LIQUID NITROGEN
Consent: The patient's consent was obtained including but not limited to risks of crusting, scabbing, blistering, scarring, darker or lighter pigmentary change, recurrence, incomplete removal and infection.
Number Of Freeze-Thaw Cycles: 2 freeze-thaw cycles
Post-Care Instructions: I reviewed with the patient in detail post-care instructions. Patient is to wear sunprotection, and avoid picking at any of the treated lesions. Pt may apply Vaseline to crusted or scabbing areas.
Show Applicator Variable?: Yes
Detail Level: Detailed
Duration Of Freeze Thaw-Cycle (Seconds): 2
Render Note In Bullet Format When Appropriate: No

## 2022-03-08 ENCOUNTER — APPOINTMENT (RX ONLY)
Dept: URBAN - METROPOLITAN AREA CLINIC 35 | Facility: CLINIC | Age: 82
Setting detail: DERMATOLOGY
End: 2022-03-08

## 2022-03-08 DIAGNOSIS — Z41.9 ENCOUNTER FOR PROCEDURE FOR PURPOSES OTHER THAN REMEDYING HEALTH STATE, UNSPECIFIED: ICD-10-CM

## 2022-03-08 PROCEDURE — ? BOTOX

## 2022-03-08 PROCEDURE — ? FILLERS

## 2022-03-08 NOTE — PROCEDURE: BOTOX
Forehead Units: 0
Expiration Date (Month Year): 5/24
Post-Care Instructions: Patient instructed to not lie down for 4 hours after injections and limit physical activity for 24 hours.
Lot #: [id number]
Additional Area 3 Location: [place]
Additional Area 5 Location: perioral
Consent: Verbal and written informed consent were obtained to include the following risks: pain, swelling, bruising, eyelid or eyebrow droop, and lack of visible improvement of wrinkles in the areas treated.  The skin was cleansed with alcohol. Injections were administered with a 32g needle into the following areas:
Reconstitution Date (Optional): 3/8/22
Dilution (U/0.1 Cc): 1.1
Additional Area 2 Units: 34
Price (Use Numbers Only, No Special Characters Or $): 233
Additional Area 2 Location: [place]
Additional Area 6 Location: platysma
Additional Area 4 Location: Bunny lines
Detail Level: Detailed
Additional Area 1 Location: [place]
Additional Area 1 Units: 16

## 2022-03-08 NOTE — PROCEDURE: FILLERS
Additional Area 1 Location: Oral Commisures
Include Cannula Information In Note?: No
Consent: Written consent obtained. Risks include but not limited to bruising, bleeding, blindness, stroke, delayed onset of nodules, irregular texture, ulceration, infection, allergic reaction, scar formation, incomplete augmentation, temporary nature, procedural pain.
Additional Area 2 Location: Border of lips
Additional Area 4 Volume In Cc: 0
Lot #: [id number]
Additional Area 3 Location: Fine lines around mouth
Expiration Date (Month Year): 2/8/23
Expiration Date (Month Year): 3/4/23
Filler: Juvederm Volbella XC
Price (Use Numbers Only, No Special Characters Or $): 900
Additional Area 4 Location: Scars
Additional Area 5 Location: Earlobes
Detail Level: Detailed
Map Statment: See Attach Map for Complete Details
Additional Area 2 Volume In Cc: 0.5
Lot #: [id number]
Use Map Statement For Sites (Optional): Yes
Post-Care Instructions: Patient instructed to apply ice to reduce swelling.
Filler: Juvederm Ultra XC

## 2022-03-11 ENCOUNTER — APPOINTMENT (RX ONLY)
Dept: URBAN - METROPOLITAN AREA CLINIC 35 | Facility: CLINIC | Age: 82
Setting detail: DERMATOLOGY
End: 2022-03-11

## 2022-03-11 DIAGNOSIS — Z41.9 ENCOUNTER FOR PROCEDURE FOR PURPOSES OTHER THAN REMEDYING HEALTH STATE, UNSPECIFIED: ICD-10-CM

## 2022-03-11 PROCEDURE — ? CLARITY LASER

## 2022-03-11 PROCEDURE — ? ADDITIONAL NOTES

## 2022-03-11 ASSESSMENT — LOCATION SIMPLE DESCRIPTION DERM
LOCATION SIMPLE: RIGHT LIP
LOCATION SIMPLE: RIGHT CHEEK
LOCATION SIMPLE: LEFT LIP

## 2022-03-11 ASSESSMENT — LOCATION ZONE DERM
LOCATION ZONE: FACE
LOCATION ZONE: LIP

## 2022-03-11 ASSESSMENT — LOCATION DETAILED DESCRIPTION DERM
LOCATION DETAILED: RIGHT SUPERIOR CENTRAL MALAR CHEEK
LOCATION DETAILED: LEFT UPPER CUTANEOUS LIP
LOCATION DETAILED: RIGHT UPPER CUTANEOUS LIP

## 2022-03-11 NOTE — PROCEDURE: ADDITIONAL NOTES
Render Risk Assessment In Note?: no
Additional Notes: Patient has her lips tattooed from 20 years ago. I did cover her lips with a tongue depressor.
Detail Level: Detailed

## 2022-03-11 NOTE — PROCEDURE: CLARITY LASER
Wavelength (Include Units): 1065
Spotsize (Include Units): 8mm
Eye Protection: Laser Aid
Detail Level: Zone
Indication: bruise
Pulse Duration (Include Units): 0.30
Fluence (Include Units): 4.0
End-Point And Post-Procedure Care: The procedure continued until mild purpura was noted.  Immediately following the procedure, Vaseline and ice applied. Post care reviewed with patient.
Post-Care Instructions: I reviewed with the patient in detail post-care instructions. Patient should stay away from the sun and wear sun protection until treated areas are fully healed.
Render Post-Care In The Note: No
Consent: Prior to the procedure consent obtained, risks reviewed including but not limited to crusting, scabbing, blistering, scarring, darker or lighter pigmentary change, incidental hair removal, bruising, and/or incomplete removal.
Treatment Number: 1
Price (Use Numbers Only, No Special Characters Or $): 0
Pre-Procedure Care: Prior to the procedure the patient and all present had protective eyewear in place and a warning sign was placed on the door.
Laser Type: Clarity
Total Pulses: 69
Pulse Energy (Include Units): 5.0

## 2022-04-06 ENCOUNTER — OFFICE VISIT (OUTPATIENT)
Dept: URGENT CARE | Facility: CLINIC | Age: 82
End: 2022-04-06
Payer: MEDICARE

## 2022-04-06 VITALS
HEART RATE: 72 BPM | TEMPERATURE: 97.9 F | DIASTOLIC BLOOD PRESSURE: 80 MMHG | RESPIRATION RATE: 14 BRPM | HEIGHT: 67 IN | BODY MASS INDEX: 18.21 KG/M2 | OXYGEN SATURATION: 94 % | SYSTOLIC BLOOD PRESSURE: 120 MMHG | WEIGHT: 116 LBS

## 2022-04-06 DIAGNOSIS — S51.811A SKIN TEAR OF FOREARM WITHOUT COMPLICATION, RIGHT, INITIAL ENCOUNTER: ICD-10-CM

## 2022-04-06 PROCEDURE — 99214 OFFICE O/P EST MOD 30 MIN: CPT | Performed by: NURSE PRACTITIONER

## 2022-04-06 RX ORDER — LORAZEPAM 1 MG/1
TABLET ORAL
COMMUNITY
Start: 2022-02-28

## 2022-04-06 RX ORDER — MELOXICAM 15 MG/1
15 TABLET ORAL
COMMUNITY
Start: 2022-01-10 | End: 2022-06-15

## 2022-04-06 RX ORDER — CEPHALEXIN 500 MG/1
500 CAPSULE ORAL 4 TIMES DAILY
Qty: 20 CAPSULE | Refills: 0 | Status: SHIPPED | OUTPATIENT
Start: 2022-04-06 | End: 2022-04-11

## 2022-04-06 RX ORDER — ESTRADIOL 0.5 MG/1
0.5 TABLET ORAL
COMMUNITY
Start: 2022-01-08

## 2022-04-06 ASSESSMENT — FIBROSIS 4 INDEX: FIB4 SCORE: 1.39

## 2022-04-06 NOTE — PROGRESS NOTES
Chief Complaint   Patient presents with   • Laceration     (R) arm, dog scratch. X yesterday        HISTORY OF PRESENT ILLNESS: Patient is a pleasant 81 y.o. female who presents to urgent care today with concerns of a skin tear.  She notes that her dog accidentally scratched her right forearm yesterday.  She did not wash the wound out immediately.  She did apply a bandage and came to urgent care today for examination.  Pain is moderate.  Her Td is up-to-date.    Patient Active Problem List    Diagnosis Date Noted   • Multiple pulmonary nodules 02/25/2020   • Ascending aortic aneurysm (HCC) 12/30/2019   • Current mild episode of major depressive disorder without prior episode (HCC) 07/19/2019   • Open wound of right lower leg 05/06/2019   • H/O nonmelanoma skin cancer 05/06/2019   • Dermatitis 05/06/2019   • Leg edema, right 05/06/2019   • Recurrent major depression in partial remission (HCC) 12/04/2017   • Left wrist pain 12/04/2017   • Pure hypercholesterolemia 11/14/2016   • Vitamin D insufficiency 11/14/2016   • Anxiety 03/21/2016   • Menopausal symptoms 05/20/2014   • Insomnia 05/20/2014   • Hx of hysterectomy for benign disease 05/20/2014   • GERD (gastroesophageal reflux disease) 05/20/2014   • Dry eye syndrome 05/20/2014   • Diverticulitis 11/03/2009   • Hypothyroid 11/03/2009   • Tension headache, chronic 11/03/2009   • Diverticulosis        Allergies:Macrobid [nitrofurantoin monohydrate macrocrystals]    Current Outpatient Medications Ordered in Epic   Medication Sig Dispense Refill   • cephALEXin (KEFLEX) 500 MG Cap Take 1 Capsule by mouth 4 times a day for 5 days. 20 Capsule 0   • mupirocin (BACTROBAN) 2 % Ointment Apply 1 Application topically 2 times a day. 22 g 0   • FLUoxetine (PROZAC) 20 MG Cap TAKE 1 CAPSULE BY MOUTH EVERY DAY 90 Cap 3   • levothyroxine (SYNTHROID) 150 MCG Tab TAKE ONE TABLET BY MOUTH EVERY MORNING ON EMPTY STOMACH 90 Tab 3   • FLUoxetine (PROZAC) 40 MG capsule Take 1 Cap by mouth  every day. 90 Cap 3   • VITAMIN D, CHOLECALCIFEROL, PO Take  by mouth.     • Omega-3 Fatty Acids (FISH OIL CONCENTRATE PO) Take  by mouth.     • Aspirin-Acetaminophen-Caffeine (EXCEDRIN PO) Take  by mouth. Last dose 3 pm     • estradiol (ESTRACE) 0.5 MG tablet Take 0.5 mg by mouth every day.     • LORazepam (ATIVAN) 1 MG Tab TAKE 1 TABLET BY MOUTH AT BEDTIME FOR 30 DAYS G47.00     • meloxicam (MOBIC) 15 MG tablet Take 15 mg by mouth every day.       No current [facility]-ordered facility-administered medications on file.       Past Medical History:   Diagnosis Date   • Chickenpox    • Diverticulosis    • Mongolian measles    • Hypothyroid    • IBS (irritable bowel syndrome)    • Influenza    • Migraines    • Mitral valve prolapse    • Mumps        Social History     Tobacco Use   • Smoking status: Never Smoker   • Smokeless tobacco: Never Used   Vaping Use   • Vaping Use: Never used   Substance Use Topics   • Alcohol use: Not Currently     Alcohol/week: 0.6 oz     Types: 1 Glasses of wine per week     Comment: wine daily- occasional   • Drug use: No       Family Status   Relation Name Status   • Mo     • Fa     History reviewed. No pertinent family history.    ROS:  Review of Systems   Constitutional: Negative for fever, chills, weight loss, malaise, and fatigue.   HENT: Negative for ear pain, nosebleeds, congestion, sore throat and neck pain.    Eyes: Negative for vision changes.   Neuro: Negative for headache, sensory changes, weakness, seizure, LOC.   Cardiovascular: Negative for chest pain, palpitations, orthopnea and leg swelling.   Respiratory: Negative for cough, sputum production, shortness of breath and wheezing.   Gastrointestinal: Negative for abdominal pain, nausea, vomiting or diarrhea.   Genitourinary: Negative for dysuria, urgency and frequency.  Musculoskeletal: Negative for falls, neck pain, back pain, joint pain, myalgias.   Skin: Positive for skin tear.  Negative for rash, diaphoresis.  "    Exam:  /80   Pulse 72   Temp 36.6 °C (97.9 °F)   Resp 14   Ht 1.702 m (5' 7\")   Wt 52.6 kg (116 lb)   SpO2 94%   General: well-nourished, well-developed female in NAD  Head: normocephalic, atraumatic  Eyes: PERRLA, no conjunctival injection, acuity grossly intact, lids normal.  Ears: normal shape and symmetry, no tenderness, no discharge. External canals are without any significant edema or erythema. Tympanic membranes are without any inflammation, no effusion. Gross auditory acuity is intact.  Nose: symmetrical without tenderness, no discharge.  Mouth/Throat: reasonable hygiene, no erythema, exudates or tonsillar enlargement.  Neck: no masses, range of motion within normal limits, no tracheal deviation. No obvious thyroid enlargement.   Lymph: no cervical adenopathy. No supraclavicular adenopathy.   Neuro: alert and oriented. Cranial nerves 1-12 grossly intact. No sensory deficit.   Cardiovascular: regular rate and rhythm. No edema.  Pulmonary: no distress. Chest is symmetrical with respiration, no wheezes, crackles, or rhonchi.   Musculoskeletal: no clubbing, appropriate muscle tone, gait is stable.  Skin: warm, no clubbing, no cyanosis, no rashes.  There is a 1 inch semicircle superficial skin tear to right forearm with adjacent half inch jagged skin tear.  Skin tears are clean, no foreign bodies, bleeding is controlled, no underlying bony tenderness.  Psych: appropriate mood, affect, judgement.         Assessment/Plan:  1. Skin tear of forearm without complication, right, initial encounter  cephALEXin (KEFLEX) 500 MG Cap       Wounds cleaned in clinic, Steri-Strips and nonstick dressing applied.  Prophylactic antibiotics provided.  Wound care discussed.  Supportive care, differential diagnoses, and indications for immediate follow-up discussed with patient.   Pathogenesis of diagnosis discussed including typical length and natural progression.   Instructed to return to clinic or nearest emergency " department for any change in condition, further concerns, or worsening of symptoms.  Patient states understanding of the plan of care and discharge instructions.  Instructed to make an appointment, for follow up, with her primary care provider.        Please note that this dictation was created using voice recognition software. I have made every reasonable attempt to correct obvious errors, but I expect that there are errors of grammar and possibly content that I did not discover before finalizing the note. I spent a total of 30 minutes with record review, exam, communication with the patient, and documentation of this encounter.        ROSELYN Marie.

## 2022-05-26 ENCOUNTER — APPOINTMENT (RX ONLY)
Dept: URBAN - METROPOLITAN AREA CLINIC 35 | Facility: CLINIC | Age: 82
Setting detail: DERMATOLOGY
End: 2022-05-26

## 2022-05-26 DIAGNOSIS — Z41.9 ENCOUNTER FOR PROCEDURE FOR PURPOSES OTHER THAN REMEDYING HEALTH STATE, UNSPECIFIED: ICD-10-CM

## 2022-05-26 PROCEDURE — ? FILLERS

## 2022-05-26 NOTE — PROCEDURE: FILLERS
Additional Area 2 Location: Border of lips
Dorsal Hands Filler Volume In Cc: 0
Include Cannula Information In Note?: No
Additional Area 1 Location: Oral Commisures
Lot #: [id number]
Additional Area 5 Location: Earlobes
Price (Use Numbers Only, No Special Characters Or $): 246
Additional Area 1 Volume In Cc: 0.1
Filler Comments: 0.4 cc upper lip\\n0.3 cc lower lip
Additional Area 4 Location: Scars
Additional Area 3 Location: Fine lines around mouth
Post-Care Instructions: Patient instructed to apply ice to reduce swelling.
Additional Area 5 Volume In Cc: 0.2
Consent: Written consent obtained. Risks include but not limited to bruising, bleeding, blindness, stroke, delayed onset of nodules, irregular texture, ulceration, infection, allergic reaction, scar formation, incomplete augmentation, temporary nature, procedural pain.
Expiration Date (Month Year): 1/26/23
Use Map Statement For Sites (Optional): Yes
Filler: Juvederm Ultra XC
Detail Level: Detailed
Map Statment: See Attach Map for Complete Details
Aspiration Statement: Aspiration was performed prior to injecting site with filler.

## 2022-06-15 ENCOUNTER — OFFICE VISIT (OUTPATIENT)
Dept: URGENT CARE | Facility: CLINIC | Age: 82
End: 2022-06-15
Payer: MEDICARE

## 2022-06-15 VITALS
RESPIRATION RATE: 16 BRPM | HEART RATE: 75 BPM | BODY MASS INDEX: 18.05 KG/M2 | SYSTOLIC BLOOD PRESSURE: 130 MMHG | OXYGEN SATURATION: 99 % | TEMPERATURE: 98.2 F | HEIGHT: 67 IN | WEIGHT: 115 LBS | DIASTOLIC BLOOD PRESSURE: 84 MMHG

## 2022-06-15 DIAGNOSIS — J01.00 ACUTE NON-RECURRENT MAXILLARY SINUSITIS: ICD-10-CM

## 2022-06-15 PROCEDURE — 99213 OFFICE O/P EST LOW 20 MIN: CPT | Performed by: NURSE PRACTITIONER

## 2022-06-15 RX ORDER — DOXYCYCLINE HYCLATE 100 MG
100 TABLET ORAL 2 TIMES DAILY
Qty: 14 TABLET | Refills: 0 | Status: SHIPPED | OUTPATIENT
Start: 2022-06-15 | End: 2022-06-22

## 2022-06-15 ASSESSMENT — ENCOUNTER SYMPTOMS
FEVER: 0
SORE THROAT: 0
SWOLLEN GLANDS: 0
VOMITING: 0
HEADACHES: 1
COUGH: 0
NAUSEA: 0
CHILLS: 0
SHORTNESS OF BREATH: 0
EYE PAIN: 0
SINUS PRESSURE: 1
SINUS PAIN: 1
MYALGIAS: 0
DIZZINESS: 0

## 2022-06-15 ASSESSMENT — FIBROSIS 4 INDEX: FIB4 SCORE: 1.41

## 2022-06-15 NOTE — PROGRESS NOTES
Subjective:   Afsaneh Lyn is a 82 y.o. female who presents for Sinus Problem (X3 weeks, ear pain/discomfort, sore throat)      Sinus Problem  This is a new problem. Episode onset:  3 weeks. The problem has been gradually worsening since onset. There has been no fever. Her pain is at a severity of 5/10. The pain is moderate. Associated symptoms include congestion, headaches and sinus pressure. Pertinent negatives include no chills, coughing, ear pain, shortness of breath, sore throat or swollen glands. Past treatments include antibiotics, acetaminophen and oral decongestants (amoxicillin). The treatment provided no relief.       Review of Systems   Constitutional: Negative for chills and fever.   HENT: Positive for congestion, sinus pressure and sinus pain. Negative for ear pain and sore throat.    Eyes: Negative for pain.   Respiratory: Negative for cough and shortness of breath.    Cardiovascular: Negative for chest pain.   Gastrointestinal: Negative for nausea and vomiting.   Genitourinary: Negative for hematuria.   Musculoskeletal: Negative for myalgias.   Skin: Negative for rash.   Neurological: Positive for headaches. Negative for dizziness.       Medications:    • doxycycline Tabs  • estradiol  • EXCEDRIN PO  • FISH OIL CONCENTRATE PO  • fluoxetine  • levothyroxine Tabs  • LORazepam Tabs  • VITAMIN D (CHOLECALCIFEROL) PO    Allergies: Macrobid [nitrofurantoin monohydrate macrocrystals]    Problem List: Afsaneh Lyn does not have any pertinent problems on file.    Surgical History:  Past Surgical History:   Procedure Laterality Date   • ABDOMINAL HYSTERECTOMY TOTAL      Hysterectomy, Total Abdominal   • SC BREAST AUGMENTATION WITH IMPLANT     • US-NEEDLE CORE BX-BREAST PANEL         Past Social Hx: Afsaneh Lyn  reports that she has never smoked. She has never used smokeless tobacco. She reports previous alcohol use of about 0.6 oz of alcohol per week. She reports that she does not  "use drugs.     Past Family Hx:  Afsaneh Lyn family history is not on file.     Problem list, medications, and allergies reviewed by myself today in Epic.     Objective:     /84   Pulse 75   Temp 36.8 °C (98.2 °F) (Temporal)   Resp 16   Ht 1.702 m (5' 7\")   Wt 52.2 kg (115 lb)   SpO2 99%   BMI 18.01 kg/m²     Physical Exam  Vitals and nursing note reviewed.   Constitutional:       General: She is not in acute distress.     Appearance: She is well-developed.   HENT:      Head: Normocephalic and atraumatic.      Right Ear: External ear normal.      Left Ear: External ear normal.      Nose: Congestion and rhinorrhea present.      Right Sinus: Maxillary sinus tenderness present.      Left Sinus: Maxillary sinus tenderness present.      Mouth/Throat:      Mouth: Mucous membranes are moist.   Eyes:      Conjunctiva/sclera: Conjunctivae normal.   Cardiovascular:      Rate and Rhythm: Normal rate.   Pulmonary:      Effort: Pulmonary effort is normal. No respiratory distress.      Breath sounds: Normal breath sounds.   Abdominal:      General: There is no distension.   Musculoskeletal:         General: Normal range of motion.   Skin:     General: Skin is warm and dry.   Neurological:      General: No focal deficit present.      Mental Status: She is alert and oriented to person, place, and time. Mental status is at baseline.      Gait: Gait (gait at baseline) normal.   Psychiatric:         Judgment: Judgment normal.         Assessment/Plan:     Diagnosis and associated orders:     1. Acute non-recurrent maxillary sinusitis  doxycycline (VIBRAMYCIN) 100 MG Tab      Comments/MDM:     I personally reviewed prior external notes and prior test results pertinent to today's visit.   Discussed management options, risks and benefits, and alternatives to treatment plan agreed upon.   Red flags discussed and indications to immediately call 911 or present to the Emergency Department.   Supportive care, differential " diagnoses, and indications for immediate follow-up discussed with patient.    • Patient expresses understanding and agrees to plan. Patient denies any other questions or concerns.   •              Please note that this dictation was created using voice recognition software. I have made a reasonable attempt to correct obvious errors, but I expect that there are errors of grammar and possibly content that I did not discover before finalizing the note.    This note was electronically signed by Wilner JEAN.

## 2022-06-21 ENCOUNTER — APPOINTMENT (RX ONLY)
Dept: URBAN - METROPOLITAN AREA CLINIC 35 | Facility: CLINIC | Age: 82
Setting detail: DERMATOLOGY
End: 2022-06-21

## 2022-06-21 DIAGNOSIS — Z41.9 ENCOUNTER FOR PROCEDURE FOR PURPOSES OTHER THAN REMEDYING HEALTH STATE, UNSPECIFIED: ICD-10-CM

## 2022-06-21 PROCEDURE — ? FILLERS

## 2022-06-21 NOTE — PROCEDURE: FILLERS
Additional Area 2 Location: Border of lips
Dorsal Hands Filler Volume In Cc: 0
Include Cannula Information In Note?: No
Additional Area 1 Location: Oral Commisures
Lot #: [id number]
Additional Area 5 Location: Earlobes
Price (Use Numbers Only, No Special Characters Or $): 274
Additional Area 4 Location: Scars
Additional Area 3 Location: Fine lines around mouth
Post-Care Instructions: Patient instructed to apply ice to reduce swelling.
Consent: Written consent obtained. Risks include but not limited to bruising, bleeding, blindness, stroke, delayed onset of nodules, irregular texture, ulceration, infection, allergic reaction, scar formation, incomplete augmentation, temporary nature, procedural pain.
Expiration Date (Month Year): 2023-05-02
Use Map Statement For Sites (Optional): Yes
Filler: Juvederm Ultra XC
Detail Level: Detailed
Map Statment: See Attach Map for Complete Details
Aspiration Statement: Aspiration was performed prior to injecting site with filler.

## 2022-07-07 ENCOUNTER — APPOINTMENT (RX ONLY)
Dept: URBAN - METROPOLITAN AREA CLINIC 35 | Facility: CLINIC | Age: 82
Setting detail: DERMATOLOGY
End: 2022-07-07

## 2022-07-07 DIAGNOSIS — Z41.9 ENCOUNTER FOR PROCEDURE FOR PURPOSES OTHER THAN REMEDYING HEALTH STATE, UNSPECIFIED: ICD-10-CM

## 2022-07-07 PROCEDURE — ? BOTOX

## 2022-07-07 PROCEDURE — ? FILLERS

## 2022-07-07 NOTE — PROCEDURE: FILLERS
Additional Area 2 Location: Border of lips
Dorsal Hands Filler Volume In Cc: 0
Include Cannula Information In Note?: No
Additional Area 1 Location: Oral Commisures
Lot #: [id number]
Additional Area 5 Location: Earlobes
Price (Use Numbers Only, No Special Characters Or $): 597
Additional Area 4 Location: Scars
Additional Area 3 Location: Fine lines around mouth
Post-Care Instructions: Patient instructed to apply ice to reduce swelling.
Consent: Written consent obtained. Risks include but not limited to bruising, bleeding, blindness, stroke, delayed onset of nodules, irregular texture, ulceration, infection, allergic reaction, scar formation, incomplete augmentation, temporary nature, procedural pain.
Expiration Date (Month Year): 2023-10-24
Use Map Statement For Sites (Optional): Yes
Filler: Juvederm Vollure XC
Detail Level: Detailed
Map Statment: See Attach Map for Complete Details
Aspiration Statement: Aspiration was performed prior to injecting site with filler.

## 2022-07-07 NOTE — PROCEDURE: BOTOX
Forehead Units: 0
Expiration Date (Month Year): 2025-02
Post-Care Instructions: Patient instructed to not lie down for 4 hours after injections and limit physical activity for 24 hours.
Lot #: [id number]
Additional Area 3 Location: [place]
Additional Area 5 Location: perioral
Consent: Verbal and written informed consent were obtained to include the following risks: pain, swelling, bruising, eyelid or eyebrow droop, and lack of visible improvement of wrinkles in the areas treated.  The skin was cleansed with alcohol. Injections were administered with a 32g needle into the following areas:
Dilution (U/0.1 Cc): 1.1
Additional Area 2 Units: 34
Price (Use Numbers Only, No Special Characters Or $): 698
Additional Area 2 Location: [place]
Additional Area 6 Location: platysma
Additional Area 4 Location: Bunny lines
Detail Level: Detailed
Additional Area 1 Location: [place]
Additional Area 1 Units: 16

## 2022-07-21 ENCOUNTER — APPOINTMENT (RX ONLY)
Dept: URBAN - METROPOLITAN AREA CLINIC 35 | Facility: CLINIC | Age: 82
Setting detail: DERMATOLOGY
End: 2022-07-21

## 2022-07-21 DIAGNOSIS — Z41.9 ENCOUNTER FOR PROCEDURE FOR PURPOSES OTHER THAN REMEDYING HEALTH STATE, UNSPECIFIED: ICD-10-CM

## 2022-07-21 PROCEDURE — ? FILLERS

## 2022-07-21 NOTE — PROCEDURE: FILLERS
Additional Area 2 Location: Border of lips
Dorsal Hands Filler Volume In Cc: 0
Include Cannula Information In Note?: No
Additional Area 1 Location: Oral Commisures
Lot #: [id number]
Additional Area 5 Location: Earlobes
Price (Use Numbers Only, No Special Characters Or $): 150
Additional Area 4 Location: Scars
Additional Area 3 Location: Fine lines around mouth
Post-Care Instructions: Patient instructed to apply ice to reduce swelling.
Consent: Written consent obtained. Risks include but not limited to bruising, bleeding, blindness, stroke, delayed onset of nodules, irregular texture, ulceration, infection, allergic reaction, scar formation, incomplete augmentation, temporary nature, procedural pain.
Expiration Date (Month Year): 2023-06-24
Use Map Statement For Sites (Optional): Yes
Filler: Juvederm Ultra XC
Detail Level: Detailed
Map Statment: See Attach Map for Complete Details
Aspiration Statement: Aspiration was performed prior to injecting site with filler.

## 2022-08-12 ENCOUNTER — APPOINTMENT (RX ONLY)
Dept: URBAN - METROPOLITAN AREA CLINIC 35 | Facility: CLINIC | Age: 82
Setting detail: DERMATOLOGY
End: 2022-08-12

## 2022-08-12 DIAGNOSIS — Z41.9 ENCOUNTER FOR PROCEDURE FOR PURPOSES OTHER THAN REMEDYING HEALTH STATE, UNSPECIFIED: ICD-10-CM

## 2022-08-12 PROCEDURE — ? FILLERS

## 2022-08-12 NOTE — PROCEDURE: FILLERS
Additional Area 2 Location: Border of lips
Dorsal Hands Filler Volume In Cc: 0
Include Cannula Information In Note?: No
Additional Area 1 Location: Oral Commisures
Lot #: [id number]
Additional Area 5 Location: Earlobes
Price (Use Numbers Only, No Special Characters Or $): 465
Additional Area 4 Location: Scars
Additional Area 3 Location: Fine lines around mouth
Post-Care Instructions: Patient instructed to apply ice to reduce swelling.
Consent: Written consent obtained. Risks include but not limited to bruising, bleeding, blindness, stroke, delayed onset of nodules, irregular texture, ulceration, infection, allergic reaction, scar formation, incomplete augmentation, temporary nature, procedural pain.
Expiration Date (Month Year): 2023-06-24
Use Map Statement For Sites (Optional): Yes
Filler: Juvederm Ultra XC
Detail Level: Detailed
Map Statment: See Attach Map for Complete Details
Aspiration Statement: Aspiration was performed prior to injecting site with filler.

## 2022-11-17 ENCOUNTER — APPOINTMENT (RX ONLY)
Dept: URBAN - METROPOLITAN AREA CLINIC 35 | Facility: CLINIC | Age: 82
Setting detail: DERMATOLOGY
End: 2022-11-17

## 2022-11-17 DIAGNOSIS — Z41.9 ENCOUNTER FOR PROCEDURE FOR PURPOSES OTHER THAN REMEDYING HEALTH STATE, UNSPECIFIED: ICD-10-CM

## 2022-11-17 PROCEDURE — ? FILLERS

## 2022-11-17 NOTE — HPI: COSMETIC (FILLERS)
complains of pain/discomfort
Have You Had Fillers Before?: has had fillers
When Was Your Last Filler Injection?: 12/19/19

## 2022-11-17 NOTE — PROCEDURE: FILLERS
Additional Area 2 Location: Border of lips
Dorsal Hands Filler Volume In Cc: 0
Lot #: [id number]
Include Cannula Information In Note?: No
Additional Area 1 Location: Oral Commisures
Lot #: [id number]
Additional Area 5 Location: Earlobes
Price (Use Numbers Only, No Special Characters Or $): 1400
Additional Area 4 Location: Scars
Additional Area 3 Location: Fine lines around mouth
Post-Care Instructions: Patient instructed to apply ice to reduce swelling.
Filler: Juvederm Voluma XC
Consent: Written consent obtained. Risks include but not limited to bruising, bleeding, blindness, stroke, delayed onset of nodules, irregular texture, ulceration, infection, allergic reaction, scar formation, incomplete augmentation, temporary nature, procedural pain.
Expiration Date (Month Year): 2023-05-15
Use Map Statement For Sites (Optional): Yes
Expiration Date (Month Year): 2023-10-30
Filler: Juvederm Ultra XC
Detail Level: Detailed
Include Cannula Size?: 25G
Map Statment: See Attach Map for Complete Details
Include Cannula Length?: 1.5 inch
Aspiration Statement: Aspiration was performed prior to injecting site with filler.

## 2023-01-01 ENCOUNTER — APPOINTMENT (OUTPATIENT)
Dept: RADIOLOGY | Facility: MEDICAL CENTER | Age: 83
DRG: 870 | End: 2023-01-01
Attending: INTERNAL MEDICINE
Payer: MEDICARE

## 2023-01-01 ENCOUNTER — APPOINTMENT (OUTPATIENT)
Dept: RADIOLOGY | Facility: MEDICAL CENTER | Age: 83
End: 2023-01-01
Attending: EMERGENCY MEDICINE
Payer: MEDICARE

## 2023-01-01 ENCOUNTER — TELEPHONE (OUTPATIENT)
Dept: HEALTH INFORMATION MANAGEMENT | Facility: OTHER | Age: 83
End: 2023-01-01

## 2023-01-01 ENCOUNTER — HOSPITAL ENCOUNTER (OUTPATIENT)
Dept: RADIOLOGY | Facility: MEDICAL CENTER | Age: 83
End: 2023-02-10
Attending: NEUROLOGICAL SURGERY
Payer: MEDICARE

## 2023-01-01 ENCOUNTER — APPOINTMENT (OUTPATIENT)
Dept: RADIOLOGY | Facility: MEDICAL CENTER | Age: 83
DRG: 870 | End: 2023-01-01
Attending: EMERGENCY MEDICINE
Payer: MEDICARE

## 2023-01-01 ENCOUNTER — APPOINTMENT (OUTPATIENT)
Dept: RADIOLOGY | Facility: MEDICAL CENTER | Age: 83
DRG: 870 | End: 2023-01-01
Payer: MEDICARE

## 2023-01-01 ENCOUNTER — TELEPHONE (OUTPATIENT)
Dept: HEALTH INFORMATION MANAGEMENT | Facility: OTHER | Age: 83
End: 2023-01-01
Payer: MEDICARE

## 2023-01-01 ENCOUNTER — APPOINTMENT (OUTPATIENT)
Dept: RADIOLOGY | Facility: MEDICAL CENTER | Age: 83
DRG: 870 | End: 2023-01-01
Attending: STUDENT IN AN ORGANIZED HEALTH CARE EDUCATION/TRAINING PROGRAM
Payer: MEDICARE

## 2023-01-01 ENCOUNTER — HOSPITAL ENCOUNTER (EMERGENCY)
Facility: MEDICAL CENTER | Age: 83
End: 2023-06-27
Attending: EMERGENCY MEDICINE
Payer: MEDICARE

## 2023-01-01 ENCOUNTER — APPOINTMENT (OUTPATIENT)
Dept: URGENT CARE | Facility: CLINIC | Age: 83
End: 2023-01-01
Payer: MEDICARE

## 2023-01-01 ENCOUNTER — APPOINTMENT (OUTPATIENT)
Dept: RADIOLOGY | Facility: MEDICAL CENTER | Age: 83
End: 2023-01-01
Attending: FAMILY MEDICINE
Payer: MEDICARE

## 2023-01-01 ENCOUNTER — APPOINTMENT (OUTPATIENT)
Dept: CARDIOLOGY | Facility: MEDICAL CENTER | Age: 83
DRG: 870 | End: 2023-01-01
Attending: STUDENT IN AN ORGANIZED HEALTH CARE EDUCATION/TRAINING PROGRAM
Payer: MEDICARE

## 2023-01-01 ENCOUNTER — HOSPITAL ENCOUNTER (OUTPATIENT)
Dept: RADIOLOGY | Facility: MEDICAL CENTER | Age: 83
End: 2023-08-11

## 2023-01-01 ENCOUNTER — HOSPITAL ENCOUNTER (INPATIENT)
Facility: MEDICAL CENTER | Age: 83
LOS: 4 days | DRG: 870 | End: 2023-08-15
Attending: INTERNAL MEDICINE | Admitting: INTERNAL MEDICINE
Payer: MEDICARE

## 2023-01-01 ENCOUNTER — APPOINTMENT (OUTPATIENT)
Dept: CARDIOLOGY | Facility: MEDICAL CENTER | Age: 83
DRG: 870 | End: 2023-01-01
Payer: MEDICARE

## 2023-01-01 VITALS
HEART RATE: 88 BPM | RESPIRATION RATE: 18 BRPM | OXYGEN SATURATION: 97 % | WEIGHT: 118.61 LBS | DIASTOLIC BLOOD PRESSURE: 80 MMHG | SYSTOLIC BLOOD PRESSURE: 140 MMHG | HEIGHT: 67 IN | TEMPERATURE: 98.6 F | BODY MASS INDEX: 18.62 KG/M2

## 2023-01-01 VITALS
HEIGHT: 67 IN | SYSTOLIC BLOOD PRESSURE: 120 MMHG | DIASTOLIC BLOOD PRESSURE: 72 MMHG | TEMPERATURE: 99.5 F | HEART RATE: 92 BPM | WEIGHT: 125.66 LBS | OXYGEN SATURATION: 92 % | RESPIRATION RATE: 45 BRPM | BODY MASS INDEX: 19.72 KG/M2

## 2023-01-01 DIAGNOSIS — R91.8 MULTIPLE PULMONARY NODULES: ICD-10-CM

## 2023-01-01 DIAGNOSIS — J18.9 PNEUMONIA DUE TO INFECTIOUS ORGANISM, UNSPECIFIED LATERALITY, UNSPECIFIED PART OF LUNG: ICD-10-CM

## 2023-01-01 DIAGNOSIS — A41.9 SEPTIC SHOCK (HCC): ICD-10-CM

## 2023-01-01 DIAGNOSIS — M54.31 RIGHT SIDED SCIATICA: ICD-10-CM

## 2023-01-01 DIAGNOSIS — J96.01 ACUTE RESPIRATORY FAILURE WITH HYPOXIA (HCC): ICD-10-CM

## 2023-01-01 DIAGNOSIS — I82.4Y1 DEEP VEIN THROMBOSIS (DVT) OF PROXIMAL VEIN OF RIGHT LOWER EXTREMITY, UNSPECIFIED CHRONICITY (HCC): ICD-10-CM

## 2023-01-01 DIAGNOSIS — T18.9XXA SWALLOWED FOREIGN BODY, INITIAL ENCOUNTER: ICD-10-CM

## 2023-01-01 DIAGNOSIS — R65.21 SEPTIC SHOCK (HCC): ICD-10-CM

## 2023-01-01 LAB
1 3 BETA D GLUCAN INTERP Q4483: POSITIVE
1,3 BETA GLUCAN SER-MCNC: 162 PG/ML
ALBUMIN SERPL BCP-MCNC: 2.5 G/DL (ref 3.2–4.9)
ALBUMIN SERPL BCP-MCNC: 2.5 G/DL (ref 3.2–4.9)
ALBUMIN SERPL BCP-MCNC: 2.6 G/DL (ref 3.2–4.9)
ALBUMIN SERPL BCP-MCNC: 2.7 G/DL (ref 3.2–4.9)
ALBUMIN SERPL BCP-MCNC: 2.7 G/DL (ref 3.2–4.9)
ALBUMIN/GLOB SERPL: 0.8 G/DL
ALBUMIN/GLOB SERPL: 0.9 G/DL
ALBUMIN/GLOB SERPL: 0.9 G/DL
ALBUMIN/GLOB SERPL: 1 G/DL
ALBUMIN/GLOB SERPL: 1 G/DL
ALP SERPL-CCNC: 145 U/L (ref 30–99)
ALP SERPL-CCNC: 161 U/L (ref 30–99)
ALP SERPL-CCNC: 161 U/L (ref 30–99)
ALP SERPL-CCNC: 176 U/L (ref 30–99)
ALP SERPL-CCNC: 198 U/L (ref 30–99)
ALT SERPL-CCNC: 191 U/L (ref 2–50)
ALT SERPL-CCNC: 195 U/L (ref 2–50)
ALT SERPL-CCNC: 212 U/L (ref 2–50)
ALT SERPL-CCNC: 225 U/L (ref 2–50)
ALT SERPL-CCNC: 44 U/L (ref 2–50)
AMORPH CRY #/AREA URNS HPF: PRESENT /HPF
AMPHET UR QL SCN: NEGATIVE
ANION GAP SERPL CALC-SCNC: 17 MMOL/L (ref 7–16)
ANION GAP SERPL CALC-SCNC: 17 MMOL/L (ref 7–16)
ANION GAP SERPL CALC-SCNC: 19 MMOL/L (ref 7–16)
ANION GAP SERPL CALC-SCNC: 20 MMOL/L (ref 7–16)
APPEARANCE UR: ABNORMAL
APPEARANCE UR: CLEAR
APTT PPP: 32.7 SEC (ref 24.7–36)
APTT PPP: 34.5 SEC (ref 24.7–36)
APTT PPP: 37 SEC (ref 24.7–36)
ARTERIAL PATENCY WRIST A: ABNORMAL
AST SERPL-CCNC: 114 U/L (ref 12–45)
AST SERPL-CCNC: 124 U/L (ref 12–45)
AST SERPL-CCNC: 289 U/L (ref 12–45)
AST SERPL-CCNC: 54 U/L (ref 12–45)
AST SERPL-CCNC: 81 U/L (ref 12–45)
B PARAP IS1001 DNA NPH QL NAA+NON-PROBE: NOT DETECTED
B PERT.PT PRMT NPH QL NAA+NON-PROBE: NOT DETECTED
BACTERIA #/AREA URNS HPF: NEGATIVE /HPF
BACTERIA #/AREA URNS HPF: NEGATIVE /HPF
BACTERIA SPEC RESP CULT: NORMAL
BACTERIA UR CULT: NORMAL
BARBITURATES UR QL SCN: NEGATIVE
BASE EXCESS BLDA CALC-SCNC: -1 MMOL/L (ref -4–3)
BASE EXCESS BLDA CALC-SCNC: -3 MMOL/L (ref -4–3)
BASE EXCESS BLDA CALC-SCNC: -4 MMOL/L (ref -4–3)
BASE EXCESS BLDA CALC-SCNC: -8 MMOL/L (ref -4–3)
BASOPHILS # BLD AUTO: 0 % (ref 0–1.8)
BASOPHILS # BLD AUTO: 0.1 % (ref 0–1.8)
BASOPHILS # BLD AUTO: 0.1 % (ref 0–1.8)
BASOPHILS # BLD: 0 K/UL (ref 0–0.12)
BASOPHILS # BLD: 0.01 K/UL (ref 0–0.12)
BASOPHILS # BLD: 0.02 K/UL (ref 0–0.12)
BENZODIAZ UR QL SCN: NEGATIVE
BILIRUB SERPL-MCNC: 0.4 MG/DL (ref 0.1–1.5)
BILIRUB SERPL-MCNC: 0.5 MG/DL (ref 0.1–1.5)
BILIRUB SERPL-MCNC: 0.6 MG/DL (ref 0.1–1.5)
BILIRUB SERPL-MCNC: 0.6 MG/DL (ref 0.1–1.5)
BILIRUB SERPL-MCNC: 0.7 MG/DL (ref 0.1–1.5)
BILIRUB UR QL STRIP.AUTO: NEGATIVE
BILIRUB UR QL STRIP.AUTO: NEGATIVE
BM IGG SER QL IF: NEGATIVE
BODY TEMPERATURE: ABNORMAL DEGREES
BREATHS SETTING VENT: 18
BREATHS SETTING VENT: 26
BUN SERPL-MCNC: 107 MG/DL (ref 8–22)
BUN SERPL-MCNC: 139 MG/DL (ref 8–22)
BUN SERPL-MCNC: 20 MG/DL (ref 8–22)
BUN SERPL-MCNC: 34 MG/DL (ref 8–22)
BUN SERPL-MCNC: 46 MG/DL (ref 8–22)
BUN SERPL-MCNC: 68 MG/DL (ref 8–22)
BZE UR QL SCN: NEGATIVE
C PNEUM DNA NPH QL NAA+NON-PROBE: NOT DETECTED
CALCIUM ALBUM COR SERPL-MCNC: 10 MG/DL (ref 8.5–10.5)
CALCIUM ALBUM COR SERPL-MCNC: 10.1 MG/DL (ref 8.5–10.5)
CALCIUM ALBUM COR SERPL-MCNC: 8.9 MG/DL (ref 8.5–10.5)
CALCIUM ALBUM COR SERPL-MCNC: 9.6 MG/DL (ref 8.5–10.5)
CALCIUM ALBUM COR SERPL-MCNC: 9.8 MG/DL (ref 8.5–10.5)
CALCIUM SERPL-MCNC: 7.8 MG/DL (ref 8.5–10.5)
CALCIUM SERPL-MCNC: 8.5 MG/DL (ref 8.5–10.5)
CALCIUM SERPL-MCNC: 8.6 MG/DL (ref 8.5–10.5)
CALCIUM SERPL-MCNC: 8.8 MG/DL (ref 8.5–10.5)
CALCIUM SERPL-MCNC: 8.8 MG/DL (ref 8.5–10.5)
CALCIUM SERPL-MCNC: 8.9 MG/DL (ref 8.5–10.5)
CANNABINOIDS UR QL SCN: NEGATIVE
CARDIOLIPIN IGA SER IA-ACNC: <10 APL
CARDIOLIPIN IGG SER IA-ACNC: <10 GPL
CARDIOLIPIN IGM SER IA-ACNC: <10 MPL
CFT BLD TEG: 5.8 MIN (ref 4.6–9.1)
CFT P HPASE BLD TEG: 6.5 MIN (ref 4.3–8.3)
CHLORIDE SERPL-SCNC: 100 MMOL/L (ref 96–112)
CHLORIDE SERPL-SCNC: 100 MMOL/L (ref 96–112)
CHLORIDE SERPL-SCNC: 101 MMOL/L (ref 96–112)
CHLORIDE SERPL-SCNC: 98 MMOL/L (ref 96–112)
CHLORIDE SERPL-SCNC: 99 MMOL/L (ref 96–112)
CHLORIDE SERPL-SCNC: 99 MMOL/L (ref 96–112)
CHOLEST SERPL-MCNC: 108 MG/DL (ref 100–199)
CLOT ANGLE BLD TEG: 73.5 DEGREES (ref 63–78)
CLOT LYSIS 30M P MA LENFR BLD TEG: 0 % (ref 0–2.6)
CO2 BLDA-SCNC: 18 MMOL/L (ref 20–33)
CO2 BLDA-SCNC: 20 MMOL/L (ref 20–33)
CO2 BLDA-SCNC: 21 MMOL/L (ref 20–33)
CO2 BLDA-SCNC: 24 MMOL/L (ref 20–33)
CO2 SERPL-SCNC: 18 MMOL/L (ref 20–33)
CO2 SERPL-SCNC: 18 MMOL/L (ref 20–33)
CO2 SERPL-SCNC: 19 MMOL/L (ref 20–33)
CO2 SERPL-SCNC: 20 MMOL/L (ref 20–33)
CO2 SERPL-SCNC: 20 MMOL/L (ref 20–33)
CO2 SERPL-SCNC: 23 MMOL/L (ref 20–33)
COLOR UR: YELLOW
COLOR UR: YELLOW
CREAT SERPL-MCNC: 1.1 MG/DL (ref 0.5–1.4)
CREAT SERPL-MCNC: 1.55 MG/DL (ref 0.5–1.4)
CREAT SERPL-MCNC: 2.15 MG/DL (ref 0.5–1.4)
CREAT SERPL-MCNC: 3.28 MG/DL (ref 0.5–1.4)
CREAT SERPL-MCNC: 4.97 MG/DL (ref 0.5–1.4)
CREAT SERPL-MCNC: 5.97 MG/DL (ref 0.5–1.4)
CRP SERPL HS-MCNC: 11.31 MG/DL (ref 0–0.75)
CRP SERPL HS-MCNC: 25.57 MG/DL (ref 0–0.75)
CRP SERPL HS-MCNC: 35.78 MG/DL (ref 0–0.75)
CT.EXTRINSIC BLD ROTEM: 1.3 MIN (ref 0.8–2.1)
CYTOLOGY REG CYTOL: NORMAL
D DIMER PPP IA.FEU-MCNC: >20 UG/ML (FEU) (ref 0–0.5)
DELSYS IDSYS: ABNORMAL
EKG IMPRESSION: NORMAL
END TIDAL CARBON DIOXIDE IECO2: 19 MMHG
END TIDAL CARBON DIOXIDE IECO2: 24 MMHG
END TIDAL CARBON DIOXIDE IECO2: 26 MMHG
END TIDAL CARBON DIOXIDE IECO2: 43 MMHG
EOSINOPHIL # BLD AUTO: 0 K/UL (ref 0–0.51)
EOSINOPHIL NFR BLD: 0 % (ref 0–6.9)
EPI CELLS #/AREA URNS HPF: ABNORMAL /HPF
EPI CELLS #/AREA URNS HPF: ABNORMAL /HPF
ERYTHROCYTE [DISTWIDTH] IN BLOOD BY AUTOMATED COUNT: 41.2 FL (ref 35.9–50)
ERYTHROCYTE [DISTWIDTH] IN BLOOD BY AUTOMATED COUNT: 41.4 FL (ref 35.9–50)
ERYTHROCYTE [DISTWIDTH] IN BLOOD BY AUTOMATED COUNT: 43.9 FL (ref 35.9–50)
ERYTHROCYTE [DISTWIDTH] IN BLOOD BY AUTOMATED COUNT: 44.8 FL (ref 35.9–50)
FENTANYL UR QL: NEGATIVE
FIBRINOGEN PPP-MCNC: 70 MG/DL (ref 215–460)
FIBRINOGEN PPP-MCNC: 93 MG/DL (ref 215–460)
FLUAV RNA NPH QL NAA+NON-PROBE: NOT DETECTED
FLUAV RNA SPEC QL NAA+PROBE: NEGATIVE
FLUBV RNA NPH QL NAA+NON-PROBE: NOT DETECTED
FLUBV RNA SPEC QL NAA+PROBE: NEGATIVE
FUNGUS SPEC FUNGUS STN: NORMAL
GFR SERPLBLD CREATININE-BSD FMLA CKD-EPI: 14 ML/MIN/1.73 M 2
GFR SERPLBLD CREATININE-BSD FMLA CKD-EPI: 23 ML/MIN/1.73 M 2
GFR SERPLBLD CREATININE-BSD FMLA CKD-EPI: 33 ML/MIN/1.73 M 2
GFR SERPLBLD CREATININE-BSD FMLA CKD-EPI: 50 ML/MIN/1.73 M 2
GFR SERPLBLD CREATININE-BSD FMLA CKD-EPI: 7 ML/MIN/1.73 M 2
GFR SERPLBLD CREATININE-BSD FMLA CKD-EPI: 8 ML/MIN/1.73 M 2
GLOBULIN SER CALC-MCNC: 2.5 G/DL (ref 1.9–3.5)
GLOBULIN SER CALC-MCNC: 2.7 G/DL (ref 1.9–3.5)
GLOBULIN SER CALC-MCNC: 2.9 G/DL (ref 1.9–3.5)
GLOBULIN SER CALC-MCNC: 3.1 G/DL (ref 1.9–3.5)
GLOBULIN SER CALC-MCNC: 3.1 G/DL (ref 1.9–3.5)
GLUCOSE BLD STRIP.AUTO-MCNC: 108 MG/DL (ref 65–99)
GLUCOSE BLD STRIP.AUTO-MCNC: 160 MG/DL (ref 65–99)
GLUCOSE BLD STRIP.AUTO-MCNC: 163 MG/DL (ref 65–99)
GLUCOSE BLD STRIP.AUTO-MCNC: 164 MG/DL (ref 65–99)
GLUCOSE BLD STRIP.AUTO-MCNC: 174 MG/DL (ref 65–99)
GLUCOSE BLD STRIP.AUTO-MCNC: 174 MG/DL (ref 65–99)
GLUCOSE BLD STRIP.AUTO-MCNC: 178 MG/DL (ref 65–99)
GLUCOSE BLD STRIP.AUTO-MCNC: 186 MG/DL (ref 65–99)
GLUCOSE BLD STRIP.AUTO-MCNC: 187 MG/DL (ref 65–99)
GLUCOSE BLD STRIP.AUTO-MCNC: 194 MG/DL (ref 65–99)
GLUCOSE BLD STRIP.AUTO-MCNC: 218 MG/DL (ref 65–99)
GLUCOSE BLD STRIP.AUTO-MCNC: 237 MG/DL (ref 65–99)
GLUCOSE BLD STRIP.AUTO-MCNC: 247 MG/DL (ref 65–99)
GLUCOSE SERPL-MCNC: 182 MG/DL (ref 65–99)
GLUCOSE SERPL-MCNC: 186 MG/DL (ref 65–99)
GLUCOSE SERPL-MCNC: 197 MG/DL (ref 65–99)
GLUCOSE SERPL-MCNC: 214 MG/DL (ref 65–99)
GLUCOSE SERPL-MCNC: 227 MG/DL (ref 65–99)
GLUCOSE SERPL-MCNC: 251 MG/DL (ref 65–99)
GLUCOSE UR STRIP.AUTO-MCNC: 100 MG/DL
GLUCOSE UR STRIP.AUTO-MCNC: NEGATIVE MG/DL
GRAM STN SPEC: NORMAL
GRAM STN SPEC: NORMAL
GRAN CASTS #/AREA URNS LPF: ABNORMAL /LPF
HADV DNA NPH QL NAA+NON-PROBE: NOT DETECTED
HAV IGM SERPL QL IA: NORMAL
HBV CORE IGM SER QL: NORMAL
HBV SURFACE AG SER QL: NORMAL
HCO3 BLDA-SCNC: 17.3 MMOL/L (ref 17–25)
HCO3 BLDA-SCNC: 19.3 MMOL/L (ref 17–25)
HCO3 BLDA-SCNC: 20.5 MMOL/L (ref 17–25)
HCO3 BLDA-SCNC: 23 MMOL/L (ref 17–25)
HCOV 229E RNA NPH QL NAA+NON-PROBE: NOT DETECTED
HCOV HKU1 RNA NPH QL NAA+NON-PROBE: NOT DETECTED
HCOV NL63 RNA NPH QL NAA+NON-PROBE: NOT DETECTED
HCOV OC43 RNA NPH QL NAA+NON-PROBE: NOT DETECTED
HCT VFR BLD AUTO: 26 % (ref 37–47)
HCT VFR BLD AUTO: 29.8 % (ref 37–47)
HCT VFR BLD AUTO: 31.6 % (ref 37–47)
HCT VFR BLD AUTO: 32 % (ref 37–47)
HCV AB SER QL: NORMAL
HDLC SERPL-MCNC: 54 MG/DL
HGB BLD-MCNC: 10.1 G/DL (ref 12–16)
HGB BLD-MCNC: 10.4 G/DL (ref 12–16)
HGB BLD-MCNC: 10.9 G/DL (ref 12–16)
HGB BLD-MCNC: 8.6 G/DL (ref 12–16)
HIV 1+2 AB+HIV1 P24 AG SERPL QL IA: NORMAL
HMPV RNA NPH QL NAA+NON-PROBE: NOT DETECTED
HOROWITZ INDEX BLDA+IHG-RTO: 126 MM[HG]
HOROWITZ INDEX BLDA+IHG-RTO: 155 MM[HG]
HOROWITZ INDEX BLDA+IHG-RTO: 161 MM[HG]
HOROWITZ INDEX BLDA+IHG-RTO: 326 MM[HG]
HPIV1 RNA NPH QL NAA+NON-PROBE: NOT DETECTED
HPIV2 RNA NPH QL NAA+NON-PROBE: NOT DETECTED
HPIV3 RNA NPH QL NAA+NON-PROBE: NOT DETECTED
HPIV4 RNA NPH QL NAA+NON-PROBE: NOT DETECTED
HYALINE CASTS #/AREA URNS LPF: ABNORMAL /LPF
HYALINE CASTS #/AREA URNS LPF: ABNORMAL /LPF
IMM GRANULOCYTES # BLD AUTO: 0.13 K/UL (ref 0–0.11)
IMM GRANULOCYTES # BLD AUTO: 0.18 K/UL (ref 0–0.11)
IMM GRANULOCYTES NFR BLD AUTO: 0.8 % (ref 0–0.9)
IMM GRANULOCYTES NFR BLD AUTO: 0.9 % (ref 0–0.9)
INR PPP: 1.65 (ref 0.87–1.13)
INR PPP: 1.65 (ref 0.87–1.13)
INR PPP: 1.75 (ref 0.87–1.13)
INR PPP: 1.77 (ref 0.87–1.13)
INR PPP: 1.77 (ref 0.87–1.13)
KETONES UR STRIP.AUTO-MCNC: ABNORMAL MG/DL
KETONES UR STRIP.AUTO-MCNC: ABNORMAL MG/DL
L PNEUMO AG UR QL IA: NEGATIVE
LACTATE BLD-SCNC: 4.4 MMOL/L (ref 0.5–2)
LACTATE SERPL-SCNC: 1.7 MMOL/L (ref 0.5–2)
LACTATE SERPL-SCNC: 1.9 MMOL/L (ref 0.5–2)
LACTATE SERPL-SCNC: 2.1 MMOL/L (ref 0.5–2)
LACTATE SERPL-SCNC: 2.2 MMOL/L (ref 0.5–2)
LACTATE SERPL-SCNC: 2.4 MMOL/L (ref 0.5–2)
LACTATE SERPL-SCNC: 2.5 MMOL/L (ref 0.5–2)
LACTATE SERPL-SCNC: 2.6 MMOL/L (ref 0.5–2)
LACTATE SERPL-SCNC: 3 MMOL/L (ref 0.5–2)
LACTATE SERPL-SCNC: 4 MMOL/L (ref 0.5–2)
LACTATE SERPL-SCNC: 6.8 MMOL/L (ref 0.5–2)
LDLC SERPL CALC-MCNC: 42 MG/DL
LEUKOCYTE ESTERASE UR QL STRIP.AUTO: NEGATIVE
LEUKOCYTE ESTERASE UR QL STRIP.AUTO: NEGATIVE
LV EJECT FRACT  99904: 40
LYMPHOCYTES # BLD AUTO: 0 K/UL (ref 1–4.8)
LYMPHOCYTES # BLD AUTO: 0.45 K/UL (ref 1–4.8)
LYMPHOCYTES # BLD AUTO: 0.75 K/UL (ref 1–4.8)
LYMPHOCYTES NFR BLD: 0 % (ref 22–41)
LYMPHOCYTES NFR BLD: 2.4 % (ref 22–41)
LYMPHOCYTES NFR BLD: 4.6 % (ref 22–41)
M PNEUMO DNA NPH QL NAA+NON-PROBE: NOT DETECTED
MAGNESIUM SERPL-MCNC: 2.1 MG/DL (ref 1.5–2.5)
MAGNESIUM SERPL-MCNC: 2.9 MG/DL (ref 1.5–2.5)
MAGNESIUM SERPL-MCNC: 3.1 MG/DL (ref 1.5–2.5)
MAGNESIUM SERPL-MCNC: 3.3 MG/DL (ref 1.5–2.5)
MANUAL DIFF BLD: NORMAL
MCF BLD TEG: 63.5 MM (ref 52–69)
MCF.PLATELET INHIB BLD ROTEM: 24.6 MM (ref 15–32)
MCH RBC QN AUTO: 30.4 PG (ref 27–33)
MCH RBC QN AUTO: 31.4 PG (ref 27–33)
MCH RBC QN AUTO: 31.8 PG (ref 27–33)
MCH RBC QN AUTO: 31.9 PG (ref 27–33)
MCHC RBC AUTO-ENTMCNC: 32.9 G/DL (ref 32.2–35.5)
MCHC RBC AUTO-ENTMCNC: 33.1 G/DL (ref 32.2–35.5)
MCHC RBC AUTO-ENTMCNC: 33.9 G/DL (ref 32.2–35.5)
MCHC RBC AUTO-ENTMCNC: 34.1 G/DL (ref 32.2–35.5)
MCV RBC AUTO: 91.9 FL (ref 81.4–97.8)
MCV RBC AUTO: 93.3 FL (ref 81.4–97.8)
MCV RBC AUTO: 94 FL (ref 81.4–97.8)
MCV RBC AUTO: 95.5 FL (ref 81.4–97.8)
METHADONE UR QL SCN: NEGATIVE
MICRO URNS: ABNORMAL
MICRO URNS: ABNORMAL
MODE IMODE: ABNORMAL
MONOCYTES # BLD AUTO: 0.51 K/UL (ref 0–0.85)
MONOCYTES # BLD AUTO: 0.62 K/UL (ref 0–0.85)
MONOCYTES # BLD AUTO: 0.76 K/UL (ref 0–0.85)
MONOCYTES NFR BLD AUTO: 2.5 % (ref 0–13.4)
MONOCYTES NFR BLD AUTO: 3.2 % (ref 0–13.4)
MONOCYTES NFR BLD AUTO: 4.7 % (ref 0–13.4)
MORPHOLOGY BLD-IMP: NORMAL
MYELOPEROXIDASE AB SER-ACNC: 0 AU/ML (ref 0–19)
NEUTROPHILS # BLD AUTO: 14.48 K/UL (ref 1.82–7.42)
NEUTROPHILS # BLD AUTO: 17.81 K/UL (ref 1.82–7.42)
NEUTROPHILS # BLD AUTO: 19.89 K/UL (ref 1.82–7.42)
NEUTROPHILS NFR BLD: 89.8 % (ref 44–72)
NEUTROPHILS NFR BLD: 93.4 % (ref 44–72)
NEUTROPHILS NFR BLD: 95.8 % (ref 44–72)
NEUTS BAND NFR BLD MANUAL: 1.7 % (ref 0–10)
NITRITE UR QL STRIP.AUTO: NEGATIVE
NITRITE UR QL STRIP.AUTO: NEGATIVE
NRBC # BLD AUTO: 0 K/UL
NRBC # BLD AUTO: 0.08 K/UL
NRBC # BLD AUTO: 0.35 K/UL
NRBC BLD-RTO: 0 /100 WBC (ref 0–0.2)
NRBC BLD-RTO: 0.4 /100 WBC (ref 0–0.2)
NRBC BLD-RTO: 1.7 /100 WBC (ref 0–0.2)
NUCLEAR IGG SER QL IA: NORMAL
O2/TOTAL GAS SETTING VFR VENT: 100 %
O2/TOTAL GAS SETTING VFR VENT: 100 %
O2/TOTAL GAS SETTING VFR VENT: 40 %
O2/TOTAL GAS SETTING VFR VENT: 50 %
OPIATES UR QL SCN: NEGATIVE
OXYCODONE UR QL SCN: NEGATIVE
PA AA BLD-ACNC: 100 % (ref 0–11)
PA ADP BLD-ACNC: 99.5 % (ref 0–17)
PCO2 BLDA: 28.2 MMHG (ref 26–37)
PCO2 BLDA: 30 MMHG (ref 26–37)
PCO2 BLDA: 33 MMHG (ref 26–37)
PCO2 BLDA: 34.7 MMHG (ref 26–37)
PCO2 TEMP ADJ BLDA: 28.6 MMHG (ref 26–37)
PCO2 TEMP ADJ BLDA: 30.2 MMHG (ref 26–37)
PCO2 TEMP ADJ BLDA: 33.3 MMHG (ref 26–37)
PCO2 TEMP ADJ BLDA: 35.8 MMHG (ref 26–37)
PCP UR QL SCN: NEGATIVE
PEEP END EXPIRATORY PRESSURE IPEEP: 14 CMH20
PEEP END EXPIRATORY PRESSURE IPEEP: 14 CMH20
PEEP END EXPIRATORY PRESSURE IPEEP: 8 CMH20
PEEP END EXPIRATORY PRESSURE IPEEP: 8 CMH20
PH BLDA: 7.33 [PH] (ref 7.4–7.5)
PH BLDA: 7.42 [PH] (ref 7.4–7.5)
PH BLDA: 7.43 [PH] (ref 7.4–7.5)
PH BLDA: 7.47 [PH] (ref 7.4–7.5)
PH TEMP ADJ BLDA: 7.33 [PH] (ref 7.4–7.5)
PH TEMP ADJ BLDA: 7.41 [PH] (ref 7.4–7.5)
PH TEMP ADJ BLDA: 7.42 [PH] (ref 7.4–7.5)
PH TEMP ADJ BLDA: 7.46 [PH] (ref 7.4–7.5)
PH UR STRIP.AUTO: 5 [PH] (ref 5–8)
PH UR STRIP.AUTO: 6 [PH] (ref 5–8)
PHOSPHATE SERPL-MCNC: 4.5 MG/DL (ref 2.5–4.5)
PHOSPHATE SERPL-MCNC: 5.3 MG/DL (ref 2.5–4.5)
PLATELET # BLD AUTO: 165 K/UL (ref 164–446)
PLATELET # BLD AUTO: 38 K/UL (ref 164–446)
PLATELET # BLD AUTO: 384 K/UL (ref 164–446)
PLATELET # BLD AUTO: 82 K/UL (ref 164–446)
PLATELET BLD QL SMEAR: NORMAL
PLATELETS.RETICULATED NFR BLD AUTO: 16.1 % (ref 0.6–13.1)
PLATELETS.RETICULATED NFR BLD AUTO: 8.1 % (ref 0.6–13.1)
PMV BLD AUTO: 11.6 FL (ref 9–12.9)
PMV BLD AUTO: 8.6 FL (ref 9–12.9)
PMV BLD AUTO: 8.8 FL (ref 9–12.9)
PO2 BLDA: 161 MMHG (ref 64–87)
PO2 BLDA: 326 MMHG (ref 64–87)
PO2 BLDA: 62 MMHG (ref 64–87)
PO2 BLDA: 63 MMHG (ref 64–87)
PO2 TEMP ADJ BLDA: 162 MMHG (ref 64–87)
PO2 TEMP ADJ BLDA: 329 MMHG (ref 64–87)
PO2 TEMP ADJ BLDA: 63 MMHG (ref 64–87)
PO2 TEMP ADJ BLDA: 65 MMHG (ref 64–87)
POIKILOCYTOSIS BLD QL SMEAR: NORMAL
POTASSIUM SERPL-SCNC: 3.7 MMOL/L (ref 3.6–5.5)
POTASSIUM SERPL-SCNC: 3.7 MMOL/L (ref 3.6–5.5)
POTASSIUM SERPL-SCNC: 3.8 MMOL/L (ref 3.6–5.5)
POTASSIUM SERPL-SCNC: 3.9 MMOL/L (ref 3.6–5.5)
POTASSIUM SERPL-SCNC: 3.9 MMOL/L (ref 3.6–5.5)
POTASSIUM SERPL-SCNC: 4.3 MMOL/L (ref 3.6–5.5)
PREALB SERPL-MCNC: 14.7 MG/DL (ref 18–38)
PROCALCITONIN SERPL-MCNC: 43.4 NG/ML
PROCALCITONIN SERPL-MCNC: 6.35 NG/ML
PROPOXYPH UR QL SCN: NEGATIVE
PROT SERPL-MCNC: 5.1 G/DL (ref 6–8.2)
PROT SERPL-MCNC: 5.4 G/DL (ref 6–8.2)
PROT SERPL-MCNC: 5.4 G/DL (ref 6–8.2)
PROT SERPL-MCNC: 5.6 G/DL (ref 6–8.2)
PROT SERPL-MCNC: 5.8 G/DL (ref 6–8.2)
PROT UR QL STRIP: 100 MG/DL
PROT UR QL STRIP: 100 MG/DL
PROTEINASE3 AB SER-ACNC: 0 AU/ML (ref 0–19)
PROTHROMBIN TIME: 19.1 SEC (ref 12–14.6)
PROTHROMBIN TIME: 19.1 SEC (ref 12–14.6)
PROTHROMBIN TIME: 20 SEC (ref 12–14.6)
PROTHROMBIN TIME: 20.2 SEC (ref 12–14.6)
PROTHROMBIN TIME: 20.2 SEC (ref 12–14.6)
RBC # BLD AUTO: 2.83 M/UL (ref 4.2–5.4)
RBC # BLD AUTO: 3.17 M/UL (ref 4.2–5.4)
RBC # BLD AUTO: 3.31 M/UL (ref 4.2–5.4)
RBC # BLD AUTO: 3.43 M/UL (ref 4.2–5.4)
RBC # URNS HPF: ABNORMAL /HPF
RBC # URNS HPF: ABNORMAL /HPF
RBC BLD AUTO: PRESENT
RBC UR QL AUTO: ABNORMAL
RBC UR QL AUTO: ABNORMAL
RENAL EPI CELLS #/AREA URNS HPF: ABNORMAL /HPF
RHODAMINE-AURAMINE STN SPEC: NORMAL
RSV RNA NPH QL NAA+NON-PROBE: NOT DETECTED
RSV RNA SPEC QL NAA+PROBE: NEGATIVE
RV+EV RNA NPH QL NAA+NON-PROBE: NOT DETECTED
S PNEUM AG UR QL: NEGATIVE
SAO2 % BLDA: 100 % (ref 93–99)
SAO2 % BLDA: 92 % (ref 93–99)
SAO2 % BLDA: 94 % (ref 93–99)
SAO2 % BLDA: 99 % (ref 93–99)
SARS-COV-2 RNA NPH QL NAA+NON-PROBE: NOTDETECTED
SARS-COV-2 RNA RESP QL NAA+PROBE: NOTDETECTED
SCHISTOCYTES BLD QL SMEAR: NORMAL
SIGNIFICANT IND 70042: NORMAL
SITE SITE: NORMAL
SODIUM SERPL-SCNC: 136 MMOL/L (ref 135–145)
SODIUM SERPL-SCNC: 137 MMOL/L (ref 135–145)
SODIUM SERPL-SCNC: 138 MMOL/L (ref 135–145)
SODIUM SERPL-SCNC: 143 MMOL/L (ref 135–145)
SOURCE SOURCE: NORMAL
SP GR UR STRIP.AUTO: 1.02
SP GR UR STRIP.AUTO: 1.03
SPECIMEN DRAWN FROM PATIENT: ABNORMAL
SPECIMEN SOURCE: NORMAL
TEG ALGORITHM TGALG: ABNORMAL
TIDAL VOLUME IVT: 310 ML
TIDAL VOLUME IVT: 310 ML
TIDAL VOLUME IVT: 370 ML
TIDAL VOLUME IVT: 370 ML
TOXIC GRANULES BLD QL SMEAR: NORMAL
TRANS CELLS #/AREA URNS HPF: ABNORMAL /HPF
TRIGL SERPL-MCNC: 62 MG/DL (ref 0–149)
TROPONIN T SERPL-MCNC: 1112 NG/L (ref 6–19)
TROPONIN T SERPL-MCNC: 1267 NG/L (ref 6–19)
TROPONIN T SERPL-MCNC: 1584 NG/L (ref 6–19)
TROPONIN T SERPL-MCNC: 174 NG/L (ref 6–19)
TROPONIN T SERPL-MCNC: 1874 NG/L (ref 6–19)
TROPONIN T SERPL-MCNC: 1971 NG/L (ref 6–19)
TROPONIN T SERPL-MCNC: 2078 NG/L (ref 6–19)
TROPONIN T SERPL-MCNC: 241 NG/L (ref 6–19)
UFH PPP CHRO-ACNC: <0.1 IU/ML
UROBILINOGEN UR STRIP.AUTO-MCNC: 0.2 MG/DL
UROBILINOGEN UR STRIP.AUTO-MCNC: 1 MG/DL
WBC # BLD AUTO: 16.1 K/UL (ref 4.8–10.8)
WBC # BLD AUTO: 19.1 K/UL (ref 4.8–10.8)
WBC # BLD AUTO: 20.4 K/UL (ref 4.8–10.8)
WBC # BLD AUTO: 28.2 K/UL (ref 4.8–10.8)
WBC #/AREA URNS HPF: ABNORMAL /HPF
WBC #/AREA URNS HPF: ABNORMAL /HPF
WBC TOXIC VACUOLES BLD QL SMEAR: NORMAL

## 2023-01-01 PROCEDURE — 36680 INSERT NEEDLE BONE CAVITY: CPT

## 2023-01-01 PROCEDURE — 700111 HCHG RX REV CODE 636 W/ 250 OVERRIDE (IP): Mod: JZ | Performed by: INTERNAL MEDICINE

## 2023-01-01 PROCEDURE — 93306 TTE W/DOPPLER COMPLETE: CPT

## 2023-01-01 PROCEDURE — 84484 ASSAY OF TROPONIN QUANT: CPT

## 2023-01-01 PROCEDURE — 99291 CRITICAL CARE FIRST HOUR: CPT | Mod: 25 | Performed by: INTERNAL MEDICINE

## 2023-01-01 PROCEDURE — 302977 HCHG BRONCHOSCOPY PROC-THERAPEUTIC

## 2023-01-01 PROCEDURE — 99292 CRITICAL CARE ADDL 30 MIN: CPT | Mod: 25 | Performed by: INTERNAL MEDICINE

## 2023-01-01 PROCEDURE — 82803 BLOOD GASES ANY COMBINATION: CPT

## 2023-01-01 PROCEDURE — 770022 HCHG ROOM/CARE - ICU (200)

## 2023-01-01 PROCEDURE — 99291 CRITICAL CARE FIRST HOUR: CPT | Performed by: EMERGENCY MEDICINE

## 2023-01-01 PROCEDURE — 84134 ASSAY OF PREALBUMIN: CPT

## 2023-01-01 PROCEDURE — 85610 PROTHROMBIN TIME: CPT

## 2023-01-01 PROCEDURE — 31500 INSERT EMERGENCY AIRWAY: CPT

## 2023-01-01 PROCEDURE — 94799 UNLISTED PULMONARY SVC/PX: CPT

## 2023-01-01 PROCEDURE — 85347 COAGULATION TIME ACTIVATED: CPT

## 2023-01-01 PROCEDURE — 700101 HCHG RX REV CODE 250: Performed by: INTERNAL MEDICINE

## 2023-01-01 PROCEDURE — 700105 HCHG RX REV CODE 258: Performed by: INTERNAL MEDICINE

## 2023-01-01 PROCEDURE — C1751 CATH, INF, PER/CENT/MIDLINE: HCPCS

## 2023-01-01 PROCEDURE — 85027 COMPLETE CBC AUTOMATED: CPT

## 2023-01-01 PROCEDURE — 83605 ASSAY OF LACTIC ACID: CPT | Mod: 91

## 2023-01-01 PROCEDURE — 93325 DOPPLER ECHO COLOR FLOW MAPG: CPT

## 2023-01-01 PROCEDURE — 81001 URINALYSIS AUTO W/SCOPE: CPT

## 2023-01-01 PROCEDURE — 94003 VENT MGMT INPAT SUBQ DAY: CPT

## 2023-01-01 PROCEDURE — 85384 FIBRINOGEN ACTIVITY: CPT

## 2023-01-01 PROCEDURE — 0B9D8ZX DRAINAGE OF RIGHT MIDDLE LUNG LOBE, VIA NATURAL OR ARTIFICIAL OPENING ENDOSCOPIC, DIAGNOSTIC: ICD-10-PCS | Performed by: INTERNAL MEDICINE

## 2023-01-01 PROCEDURE — 99292 CRITICAL CARE ADDL 30 MIN: CPT | Mod: 25,GC | Performed by: INTERNAL MEDICINE

## 2023-01-01 PROCEDURE — 700111 HCHG RX REV CODE 636 W/ 250 OVERRIDE (IP): Mod: JZ

## 2023-01-01 PROCEDURE — A9270 NON-COVERED ITEM OR SERVICE: HCPCS | Performed by: INTERNAL MEDICINE

## 2023-01-01 PROCEDURE — 86255 FLUORESCENT ANTIBODY SCREEN: CPT

## 2023-01-01 PROCEDURE — 700102 HCHG RX REV CODE 250 W/ 637 OVERRIDE(OP): Performed by: INTERNAL MEDICINE

## 2023-01-01 PROCEDURE — 87449 NOS EACH ORGANISM AG IA: CPT

## 2023-01-01 PROCEDURE — 86038 ANTINUCLEAR ANTIBODIES: CPT

## 2023-01-01 PROCEDURE — 93971 EXTREMITY STUDY: CPT | Mod: RT

## 2023-01-01 PROCEDURE — 87633 RESP VIRUS 12-25 TARGETS: CPT

## 2023-01-01 PROCEDURE — 72148 MRI LUMBAR SPINE W/O DYE: CPT

## 2023-01-01 PROCEDURE — 4410203 IR-INSERT IVC FILTER WITH IG & SI

## 2023-01-01 PROCEDURE — 86039 ANTINUCLEAR ANTIBODIES (ANA): CPT

## 2023-01-01 PROCEDURE — 0YHJ33Z INSERTION OF INFUSION DEVICE INTO LEFT LOWER LEG, PERCUTANEOUS APPROACH: ICD-10-PCS | Performed by: INTERNAL MEDICINE

## 2023-01-01 PROCEDURE — 700105 HCHG RX REV CODE 258: Performed by: STUDENT IN AN ORGANIZED HEALTH CARE EDUCATION/TRAINING PROGRAM

## 2023-01-01 PROCEDURE — 85730 THROMBOPLASTIN TIME PARTIAL: CPT

## 2023-01-01 PROCEDURE — 700111 HCHG RX REV CODE 636 W/ 250 OVERRIDE (IP): Performed by: INTERNAL MEDICINE

## 2023-01-01 PROCEDURE — 80053 COMPREHEN METABOLIC PANEL: CPT

## 2023-01-01 PROCEDURE — 700117 HCHG RX CONTRAST REV CODE 255: Performed by: STUDENT IN AN ORGANIZED HEALTH CARE EDUCATION/TRAINING PROGRAM

## 2023-01-01 PROCEDURE — 37799 UNLISTED PX VASCULAR SURGERY: CPT

## 2023-01-01 PROCEDURE — A9270 NON-COVERED ITEM OR SERVICE: HCPCS | Performed by: STUDENT IN AN ORGANIZED HEALTH CARE EDUCATION/TRAINING PROGRAM

## 2023-01-01 PROCEDURE — 99291 CRITICAL CARE FIRST HOUR: CPT | Performed by: INTERNAL MEDICINE

## 2023-01-01 PROCEDURE — 94002 VENT MGMT INPAT INIT DAY: CPT

## 2023-01-01 PROCEDURE — 86331 IMMUNODIFFUSION OUCHTERLONY: CPT | Mod: 91

## 2023-01-01 PROCEDURE — 86140 C-REACTIVE PROTEIN: CPT

## 2023-01-01 PROCEDURE — 700102 HCHG RX REV CODE 250 W/ 637 OVERRIDE(OP): Performed by: STUDENT IN AN ORGANIZED HEALTH CARE EDUCATION/TRAINING PROGRAM

## 2023-01-01 PROCEDURE — 85055 RETICULATED PLATELET ASSAY: CPT

## 2023-01-01 PROCEDURE — 87205 SMEAR GRAM STAIN: CPT

## 2023-01-01 PROCEDURE — 83605 ASSAY OF LACTIC ACID: CPT

## 2023-01-01 PROCEDURE — 700105 HCHG RX REV CODE 258: Mod: JZ | Performed by: INTERNAL MEDICINE

## 2023-01-01 PROCEDURE — 83735 ASSAY OF MAGNESIUM: CPT

## 2023-01-01 PROCEDURE — 71045 X-RAY EXAM CHEST 1 VIEW: CPT

## 2023-01-01 PROCEDURE — 700111 HCHG RX REV CODE 636 W/ 250 OVERRIDE (IP): Performed by: EMERGENCY MEDICINE

## 2023-01-01 PROCEDURE — 93010 ELECTROCARDIOGRAM REPORT: CPT | Performed by: INTERNAL MEDICINE

## 2023-01-01 PROCEDURE — 92950 HEART/LUNG RESUSCITATION CPR: CPT

## 2023-01-01 PROCEDURE — 85613 RUSSELL VIPER VENOM DILUTED: CPT | Mod: 91

## 2023-01-01 PROCEDURE — 700101 HCHG RX REV CODE 250: Performed by: EMERGENCY MEDICINE

## 2023-01-01 PROCEDURE — B24BZZ4 ULTRASONOGRAPHY OF HEART WITH AORTA, TRANSESOPHAGEAL: ICD-10-PCS | Performed by: INTERNAL MEDICINE

## 2023-01-01 PROCEDURE — 86431 RHEUMATOID FACTOR QUANT: CPT

## 2023-01-01 PROCEDURE — 84145 PROCALCITONIN (PCT): CPT

## 2023-01-01 PROCEDURE — 85025 COMPLETE CBC W/AUTO DIFF WBC: CPT

## 2023-01-01 PROCEDURE — 31500 INSERT EMERGENCY AIRWAY: CPT | Mod: GC | Performed by: INTERNAL MEDICINE

## 2023-01-01 PROCEDURE — 36680 INSERT NEEDLE BONE CAVITY: CPT | Performed by: INTERNAL MEDICINE

## 2023-01-01 PROCEDURE — 86635 COCCIDIOIDES ANTIBODY: CPT | Mod: 91

## 2023-01-01 PROCEDURE — 0241U HCHG SARS-COV-2 COVID-19 NFCT DS RESP RNA 4 TRGT MIC: CPT

## 2023-01-01 PROCEDURE — 93306 TTE W/DOPPLER COMPLETE: CPT | Mod: 26 | Performed by: INTERNAL MEDICINE

## 2023-01-01 PROCEDURE — 83516 IMMUNOASSAY NONANTIBODY: CPT | Mod: 91

## 2023-01-01 PROCEDURE — 87102 FUNGUS ISOLATION CULTURE: CPT

## 2023-01-01 PROCEDURE — 700111 HCHG RX REV CODE 636 W/ 250 OVERRIDE (IP): Mod: JZ | Performed by: STUDENT IN AN ORGANIZED HEALTH CARE EDUCATION/TRAINING PROGRAM

## 2023-01-01 PROCEDURE — 85007 BL SMEAR W/DIFF WBC COUNT: CPT

## 2023-01-01 PROCEDURE — 86005 ALLG SPEC IGE MULTIALLG SCR: CPT

## 2023-01-01 PROCEDURE — 87086 URINE CULTURE/COLONY COUNT: CPT

## 2023-01-01 PROCEDURE — 88112 CYTOPATH CELL ENHANCE TECH: CPT

## 2023-01-01 PROCEDURE — 99152 MOD SED SAME PHYS/QHP 5/>YRS: CPT

## 2023-01-01 PROCEDURE — 36600 WITHDRAWAL OF ARTERIAL BLOOD: CPT

## 2023-01-01 PROCEDURE — 31624 DX BRONCHOSCOPE/LAVAGE: CPT | Performed by: INTERNAL MEDICINE

## 2023-01-01 PROCEDURE — 36556 INSERT NON-TUNNEL CV CATH: CPT

## 2023-01-01 PROCEDURE — 84182 PROTEIN WESTERN BLOT TEST: CPT | Mod: 91

## 2023-01-01 PROCEDURE — 86200 CCP ANTIBODY: CPT

## 2023-01-01 PROCEDURE — 80074 ACUTE HEPATITIS PANEL: CPT

## 2023-01-01 PROCEDURE — 95822 EEG COMA OR SLEEP ONLY: CPT | Mod: 26 | Performed by: STUDENT IN AN ORGANIZED HEALTH CARE EDUCATION/TRAINING PROGRAM

## 2023-01-01 PROCEDURE — 93005 ELECTROCARDIOGRAM TRACING: CPT | Performed by: STUDENT IN AN ORGANIZED HEALTH CARE EDUCATION/TRAINING PROGRAM

## 2023-01-01 PROCEDURE — 82962 GLUCOSE BLOOD TEST: CPT

## 2023-01-01 PROCEDURE — 93971 EXTREMITY STUDY: CPT | Mod: 26,RT | Performed by: INTERNAL MEDICINE

## 2023-01-01 PROCEDURE — 31646 BRNCHSC W/THER ASPIR SBSQ: CPT | Performed by: INTERNAL MEDICINE

## 2023-01-01 PROCEDURE — 06H03DZ INSERTION OF INTRALUMINAL DEVICE INTO INFERIOR VENA CAVA, PERCUTANEOUS APPROACH: ICD-10-PCS | Performed by: RADIOLOGY

## 2023-01-01 PROCEDURE — 700101 HCHG RX REV CODE 250: Performed by: STUDENT IN AN ORGANIZED HEALTH CARE EDUCATION/TRAINING PROGRAM

## 2023-01-01 PROCEDURE — 87070 CULTURE OTHR SPECIMN AEROBIC: CPT

## 2023-01-01 PROCEDURE — 93312 ECHO TRANSESOPHAGEAL: CPT | Mod: 26,59 | Performed by: INTERNAL MEDICINE

## 2023-01-01 PROCEDURE — 86606 ASPERGILLUS ANTIBODY: CPT | Mod: 91

## 2023-01-01 PROCEDURE — 87798 DETECT AGENT NOS DNA AMP: CPT | Mod: 91

## 2023-01-01 PROCEDURE — 85576 BLOOD PLATELET AGGREGATION: CPT | Mod: 91

## 2023-01-01 PROCEDURE — 85525 HEPARIN NEUTRALIZATION: CPT

## 2023-01-01 PROCEDURE — 93970 EXTREMITY STUDY: CPT

## 2023-01-01 PROCEDURE — 87486 CHLMYD PNEUM DNA AMP PROBE: CPT

## 2023-01-01 PROCEDURE — 70490 CT SOFT TISSUE NECK W/O DYE: CPT

## 2023-01-01 PROCEDURE — 85597 PHOSPHOLIPID PLTLT NEUTRALIZ: CPT

## 2023-01-01 PROCEDURE — 99283 EMERGENCY DEPT VISIT LOW MDM: CPT

## 2023-01-01 PROCEDURE — G0475 HIV COMBINATION ASSAY: HCPCS

## 2023-01-01 PROCEDURE — 85379 FIBRIN DEGRADATION QUANT: CPT

## 2023-01-01 PROCEDURE — 700117 HCHG RX CONTRAST REV CODE 255: Performed by: RADIOLOGY

## 2023-01-01 PROCEDURE — 87581 M.PNEUMON DNA AMP PROBE: CPT

## 2023-01-01 PROCEDURE — 93970 EXTREMITY STUDY: CPT | Mod: 26 | Performed by: INTERNAL MEDICINE

## 2023-01-01 PROCEDURE — 86147 CARDIOLIPIN ANTIBODY EA IG: CPT

## 2023-01-01 PROCEDURE — 85732 THROMBOPLASTIN TIME PARTIAL: CPT

## 2023-01-01 PROCEDURE — 0B21XEZ CHANGE ENDOTRACHEAL AIRWAY IN TRACHEA, EXTERNAL APPROACH: ICD-10-PCS | Performed by: INTERNAL MEDICINE

## 2023-01-01 PROCEDURE — 85520 HEPARIN ASSAY: CPT

## 2023-01-01 PROCEDURE — 85635 REPTILASE TEST: CPT

## 2023-01-01 PROCEDURE — 88305 TISSUE EXAM BY PATHOLOGIST: CPT

## 2023-01-01 PROCEDURE — 5A1955Z RESPIRATORY VENTILATION, GREATER THAN 96 CONSECUTIVE HOURS: ICD-10-PCS | Performed by: INTERNAL MEDICINE

## 2023-01-01 PROCEDURE — 82962 GLUCOSE BLOOD TEST: CPT | Mod: 91

## 2023-01-01 PROCEDURE — 99223 1ST HOSP IP/OBS HIGH 75: CPT | Performed by: INTERNAL MEDICINE

## 2023-01-01 PROCEDURE — 4A10X4Z MONITORING OF CENTRAL NERVOUS ELECTRICAL ACTIVITY, EXTERNAL APPROACH: ICD-10-PCS | Performed by: STUDENT IN AN ORGANIZED HEALTH CARE EDUCATION/TRAINING PROGRAM

## 2023-01-01 PROCEDURE — 700105 HCHG RX REV CODE 258: Mod: JZ | Performed by: EMERGENCY MEDICINE

## 2023-01-01 PROCEDURE — 94760 N-INVAS EAR/PLS OXIMETRY 1: CPT

## 2023-01-01 PROCEDURE — 95822 EEG COMA OR SLEEP ONLY: CPT | Performed by: STUDENT IN AN ORGANIZED HEALTH CARE EDUCATION/TRAINING PROGRAM

## 2023-01-01 PROCEDURE — 80048 BASIC METABOLIC PNL TOTAL CA: CPT

## 2023-01-01 PROCEDURE — 85730 THROMBOPLASTIN TIME PARTIAL: CPT | Mod: 91

## 2023-01-01 PROCEDURE — 80307 DRUG TEST PRSMV CHEM ANLYZR: CPT

## 2023-01-01 PROCEDURE — C9803 HOPD COVID-19 SPEC COLLECT: HCPCS | Performed by: STUDENT IN AN ORGANIZED HEALTH CARE EDUCATION/TRAINING PROGRAM

## 2023-01-01 PROCEDURE — 87015 SPECIMEN INFECT AGNT CONCNTJ: CPT

## 2023-01-01 PROCEDURE — 87206 SMEAR FLUORESCENT/ACID STAI: CPT

## 2023-01-01 PROCEDURE — 71275 CT ANGIOGRAPHY CHEST: CPT

## 2023-01-01 PROCEDURE — 36620 INSERTION CATHETER ARTERY: CPT

## 2023-01-01 PROCEDURE — 87116 MYCOBACTERIA CULTURE: CPT

## 2023-01-01 PROCEDURE — 84100 ASSAY OF PHOSPHORUS: CPT

## 2023-01-01 PROCEDURE — 02HV33Z INSERTION OF INFUSION DEVICE INTO SUPERIOR VENA CAVA, PERCUTANEOUS APPROACH: ICD-10-PCS | Performed by: INTERNAL MEDICINE

## 2023-01-01 PROCEDURE — 93005 ELECTROCARDIOGRAM TRACING: CPT | Performed by: INTERNAL MEDICINE

## 2023-01-01 PROCEDURE — 80061 LIPID PANEL: CPT

## 2023-01-01 PROCEDURE — 0BJ08ZZ INSPECTION OF TRACHEOBRONCHIAL TREE, VIA NATURAL OR ARTIFICIAL OPENING ENDOSCOPIC: ICD-10-PCS | Performed by: INTERNAL MEDICINE

## 2023-01-01 PROCEDURE — 85670 THROMBIN TIME PLASMA: CPT

## 2023-01-01 PROCEDURE — 99292 CRITICAL CARE ADDL 30 MIN: CPT | Performed by: EMERGENCY MEDICINE

## 2023-01-01 PROCEDURE — 36556 INSERT NON-TUNNEL CV CATH: CPT | Performed by: INTERNAL MEDICINE

## 2023-01-01 PROCEDURE — 86235 NUCLEAR ANTIGEN ANTIBODY: CPT

## 2023-01-01 PROCEDURE — 93010 ELECTROCARDIOGRAM REPORT: CPT | Mod: 59 | Performed by: INTERNAL MEDICINE

## 2023-01-01 PROCEDURE — 03HY32Z INSERTION OF MONITORING DEVICE INTO UPPER ARTERY, PERCUTANEOUS APPROACH: ICD-10-PCS | Performed by: INTERNAL MEDICINE

## 2023-01-01 PROCEDURE — 87899 AGENT NOS ASSAY W/OPTIC: CPT

## 2023-01-01 PROCEDURE — 31645 BRNCHSC W/THER ASPIR 1ST: CPT | Performed by: INTERNAL MEDICINE

## 2023-01-01 PROCEDURE — 85598 HEXAGNAL PHOSPH PLTLT NEUTRL: CPT

## 2023-01-01 PROCEDURE — 87040 BLOOD CULTURE FOR BACTERIA: CPT | Mod: 91

## 2023-01-01 PROCEDURE — 84484 ASSAY OF TROPONIN QUANT: CPT | Mod: 91

## 2023-01-01 PROCEDURE — 36620 INSERTION CATHETER ARTERY: CPT | Mod: GC | Performed by: INTERNAL MEDICINE

## 2023-01-01 RX ORDER — FAMOTIDINE 20 MG/1
20 TABLET, FILM COATED ORAL EVERY 12 HOURS
Status: DISCONTINUED | OUTPATIENT
Start: 2023-01-01 | End: 2023-01-01

## 2023-01-01 RX ORDER — EPINEPHRINE HCL IN 0.9 % NACL 4MG/250ML
0-.5 PLASTIC BAG, INJECTION (ML) INTRAVENOUS CONTINUOUS
Status: DISCONTINUED | OUTPATIENT
Start: 2023-01-01 | End: 2023-01-01

## 2023-01-01 RX ORDER — ONDANSETRON 2 MG/ML
4 INJECTION INTRAMUSCULAR; INTRAVENOUS EVERY 4 HOURS PRN
Status: DISCONTINUED | OUTPATIENT
Start: 2023-01-01 | End: 2023-01-01

## 2023-01-01 RX ORDER — LORAZEPAM 2 MG/ML
1 INJECTION INTRAMUSCULAR ONCE
Status: COMPLETED | OUTPATIENT
Start: 2023-01-01 | End: 2023-01-01

## 2023-01-01 RX ORDER — SUCCINYLCHOLINE CHLORIDE 20 MG/ML
50 INJECTION INTRAMUSCULAR; INTRAVENOUS ONCE
Status: COMPLETED | OUTPATIENT
Start: 2023-01-01 | End: 2023-01-01

## 2023-01-01 RX ORDER — ACETAMINOPHEN 325 MG/1
650 TABLET ORAL EVERY 6 HOURS PRN
Status: DISCONTINUED | OUTPATIENT
Start: 2023-01-01 | End: 2023-01-01

## 2023-01-01 RX ORDER — BISACODYL 10 MG
10 SUPPOSITORY, RECTAL RECTAL
Status: DISCONTINUED | OUTPATIENT
Start: 2023-01-01 | End: 2023-01-01

## 2023-01-01 RX ORDER — MAGNESIUM SULFATE HEPTAHYDRATE 40 MG/ML
2 INJECTION, SOLUTION INTRAVENOUS ONCE
Status: COMPLETED | OUTPATIENT
Start: 2023-01-01 | End: 2023-01-01

## 2023-01-01 RX ORDER — AMOXICILLIN 250 MG
2 CAPSULE ORAL 2 TIMES DAILY
Status: DISCONTINUED | OUTPATIENT
Start: 2023-01-01 | End: 2023-01-01

## 2023-01-01 RX ORDER — NOREPINEPHRINE BITARTRATE 0.03 MG/ML
0-1 INJECTION, SOLUTION INTRAVENOUS CONTINUOUS
Status: ACTIVE | OUTPATIENT
Start: 2023-01-01 | End: 2023-01-01

## 2023-01-01 RX ORDER — PHENYLEPHRINE HCL IN 0.9% NACL 0.5 MG/5ML
100-500 SYRINGE (ML) INTRAVENOUS
Status: ACTIVE | OUTPATIENT
Start: 2023-01-01 | End: 2023-01-01

## 2023-01-01 RX ORDER — DEXTROSE MONOHYDRATE 25 G/50ML
25 INJECTION, SOLUTION INTRAVENOUS
Status: DISCONTINUED | OUTPATIENT
Start: 2023-01-01 | End: 2023-01-01

## 2023-01-01 RX ORDER — POLYETHYLENE GLYCOL 3350 17 G/17G
1 POWDER, FOR SOLUTION ORAL
Status: DISCONTINUED | OUTPATIENT
Start: 2023-01-01 | End: 2023-01-01

## 2023-01-01 RX ORDER — LORAZEPAM 2 MG/ML
1 INJECTION INTRAMUSCULAR
Status: DISCONTINUED | OUTPATIENT
Start: 2023-01-01 | End: 2023-01-01 | Stop reason: HOSPADM

## 2023-01-01 RX ORDER — SODIUM CHLORIDE, SODIUM LACTATE, POTASSIUM CHLORIDE, AND CALCIUM CHLORIDE .6; .31; .03; .02 G/100ML; G/100ML; G/100ML; G/100ML
1000 INJECTION, SOLUTION INTRAVENOUS ONCE
Status: COMPLETED | OUTPATIENT
Start: 2023-01-01 | End: 2023-01-01

## 2023-01-01 RX ORDER — IPRATROPIUM BROMIDE AND ALBUTEROL SULFATE 2.5; .5 MG/3ML; MG/3ML
3 SOLUTION RESPIRATORY (INHALATION)
Status: DISCONTINUED | OUTPATIENT
Start: 2023-01-01 | End: 2023-01-01

## 2023-01-01 RX ORDER — LABETALOL HYDROCHLORIDE 5 MG/ML
10 INJECTION, SOLUTION INTRAVENOUS EVERY 4 HOURS PRN
Status: DISCONTINUED | OUTPATIENT
Start: 2023-01-01 | End: 2023-01-01

## 2023-01-01 RX ORDER — PHENYLEPHRINE HCL IN 0.9% NACL 0.5 MG/5ML
SYRINGE (ML) INTRAVENOUS
Status: ACTIVE
Start: 2023-01-01 | End: 2023-01-01

## 2023-01-01 RX ORDER — MAGNESIUM SULFATE HEPTAHYDRATE 40 MG/ML
INJECTION, SOLUTION INTRAVENOUS
Status: COMPLETED
Start: 2023-01-01 | End: 2023-01-01

## 2023-01-01 RX ORDER — HEPARIN SODIUM 5000 [USP'U]/100ML
0-30 INJECTION, SOLUTION INTRAVENOUS CONTINUOUS
Status: DISCONTINUED | OUTPATIENT
Start: 2023-01-01 | End: 2023-01-01

## 2023-01-01 RX ORDER — MIDAZOLAM HYDROCHLORIDE 1 MG/ML
INJECTION INTRAMUSCULAR; INTRAVENOUS
Status: COMPLETED
Start: 2023-01-01 | End: 2023-01-01

## 2023-01-01 RX ORDER — AZITHROMYCIN 250 MG/1
500 TABLET, FILM COATED ORAL
Status: COMPLETED | OUTPATIENT
Start: 2023-01-01 | End: 2023-01-01

## 2023-01-01 RX ORDER — ONDANSETRON 4 MG/1
4 TABLET, ORALLY DISINTEGRATING ORAL EVERY 4 HOURS PRN
Status: DISCONTINUED | OUTPATIENT
Start: 2023-01-01 | End: 2023-01-01

## 2023-01-01 RX ORDER — LORAZEPAM 2 MG/ML
1 CONCENTRATE ORAL
Status: DISCONTINUED | OUTPATIENT
Start: 2023-01-01 | End: 2023-01-01 | Stop reason: HOSPADM

## 2023-01-01 RX ORDER — FAMOTIDINE 20 MG/1
20 TABLET, FILM COATED ORAL DAILY
Status: DISCONTINUED | OUTPATIENT
Start: 2023-01-01 | End: 2023-01-01

## 2023-01-01 RX ORDER — PHENYLEPHRINE HYDROCHLORIDE 10 MG/ML
INJECTION, SOLUTION INTRAMUSCULAR; INTRAVENOUS; SUBCUTANEOUS
Status: ACTIVE
Start: 2023-01-01 | End: 2023-01-01

## 2023-01-01 RX ORDER — HEPARIN SODIUM 5000 [USP'U]/ML
5000 INJECTION, SOLUTION INTRAVENOUS; SUBCUTANEOUS EVERY 8 HOURS
Status: DISCONTINUED | OUTPATIENT
Start: 2023-01-01 | End: 2023-01-01

## 2023-01-01 RX ORDER — AZITHROMYCIN 500 MG/5ML
500 INJECTION, POWDER, LYOPHILIZED, FOR SOLUTION INTRAVENOUS EVERY EVENING
Status: DISCONTINUED | OUTPATIENT
Start: 2023-01-01 | End: 2023-01-01

## 2023-01-01 RX ORDER — HEPARIN SODIUM 1000 [USP'U]/ML
40 INJECTION, SOLUTION INTRAVENOUS; SUBCUTANEOUS PRN
Status: DISCONTINUED | OUTPATIENT
Start: 2023-01-01 | End: 2023-01-01

## 2023-01-01 RX ORDER — ETOMIDATE 2 MG/ML
15 INJECTION INTRAVENOUS ONCE
Status: COMPLETED | OUTPATIENT
Start: 2023-01-01 | End: 2023-01-01

## 2023-01-01 RX ORDER — DEXMEDETOMIDINE HYDROCHLORIDE 4 UG/ML
.1-1.5 INJECTION, SOLUTION INTRAVENOUS CONTINUOUS
Status: DISCONTINUED | OUTPATIENT
Start: 2023-01-01 | End: 2023-01-01

## 2023-01-01 RX ORDER — MIDAZOLAM HYDROCHLORIDE 1 MG/ML
2 INJECTION INTRAMUSCULAR; INTRAVENOUS ONCE
Status: COMPLETED | OUTPATIENT
Start: 2023-01-01 | End: 2023-01-01

## 2023-01-01 RX ORDER — HEPARIN SODIUM 1000 [USP'U]/ML
80 INJECTION, SOLUTION INTRAVENOUS; SUBCUTANEOUS ONCE
Status: DISCONTINUED | OUTPATIENT
Start: 2023-01-01 | End: 2023-01-01

## 2023-01-01 RX ADMIN — SENNOSIDES AND DOCUSATE SODIUM 2 TABLET: 50; 8.6 TABLET ORAL at 18:14

## 2023-01-01 RX ADMIN — FENTANYL CITRATE 100 MCG: 50 INJECTION, SOLUTION INTRAMUSCULAR; INTRAVENOUS at 00:07

## 2023-01-01 RX ADMIN — HYDROCORTISONE SODIUM SUCCINATE 100 MG: 100 INJECTION, POWDER, FOR SOLUTION INTRAMUSCULAR; INTRAVENOUS at 23:59

## 2023-01-01 RX ADMIN — SODIUM CHLORIDE 7.5 MG/HR: 9 INJECTION, SOLUTION INTRAVENOUS at 06:43

## 2023-01-01 RX ADMIN — MORPHINE SULFATE 10 MG: 10 INJECTION INTRAVENOUS at 15:57

## 2023-01-01 RX ADMIN — FENTANYL CITRATE 100 MCG: 50 INJECTION, SOLUTION INTRAMUSCULAR; INTRAVENOUS at 14:35

## 2023-01-01 RX ADMIN — SODIUM CHLORIDE 5 MG/HR: 9 INJECTION, SOLUTION INTRAVENOUS at 17:12

## 2023-01-01 RX ADMIN — NOREPINEPHRINE BITARTRATE 0.18 MCG/KG/MIN: 1 INJECTION, SOLUTION, CONCENTRATE INTRAVENOUS at 22:37

## 2023-01-01 RX ADMIN — SODIUM CHLORIDE 5 MG/HR: 9 INJECTION, SOLUTION INTRAVENOUS at 03:27

## 2023-01-01 RX ADMIN — AMPICILLIN AND SULBACTAM 3 G: 1; 2 INJECTION, POWDER, FOR SOLUTION INTRAMUSCULAR; INTRAVENOUS at 18:14

## 2023-01-01 RX ADMIN — LORAZEPAM 1 MG: 2 INJECTION INTRAMUSCULAR; INTRAVENOUS at 16:19

## 2023-01-01 RX ADMIN — INSULIN HUMAN 3 UNITS: 100 INJECTION, SOLUTION PARENTERAL at 05:04

## 2023-01-01 RX ADMIN — FENTANYL CITRATE 100 MCG: 50 INJECTION, SOLUTION INTRAMUSCULAR; INTRAVENOUS at 21:49

## 2023-01-01 RX ADMIN — FAMOTIDINE 20 MG: 10 INJECTION INTRAVENOUS at 18:03

## 2023-01-01 RX ADMIN — SODIUM CHLORIDE 1000 MG: 9 INJECTION, SOLUTION INTRAVENOUS at 05:17

## 2023-01-01 RX ADMIN — LABETALOL HYDROCHLORIDE 10 MG: 5 INJECTION INTRAVENOUS at 09:02

## 2023-01-01 RX ADMIN — AZITHROMYCIN 500 MG: 500 INJECTION, POWDER, LYOPHILIZED, FOR SOLUTION INTRAVENOUS at 18:09

## 2023-01-01 RX ADMIN — POLYETHYLENE GLYCOL 3350 1 PACKET: 17 POWDER, FOR SOLUTION ORAL at 05:06

## 2023-01-01 RX ADMIN — SENNOSIDES AND DOCUSATE SODIUM 2 TABLET: 50; 8.6 TABLET ORAL at 05:26

## 2023-01-01 RX ADMIN — LORAZEPAM 1 MG: 2 INJECTION INTRAMUSCULAR; INTRAVENOUS at 16:20

## 2023-01-01 RX ADMIN — MAGNESIUM SULFATE HEPTAHYDRATE 2 G: 2 INJECTION, SOLUTION INTRAVENOUS at 15:03

## 2023-01-01 RX ADMIN — SODIUM CHLORIDE 7.5 MG/HR: 9 INJECTION, SOLUTION INTRAVENOUS at 10:06

## 2023-01-01 RX ADMIN — AMPICILLIN AND SULBACTAM 3 G: 1; 2 INJECTION, POWDER, FOR SOLUTION INTRAMUSCULAR; INTRAVENOUS at 23:58

## 2023-01-01 RX ADMIN — ETOMIDATE INJECTION 15 MG: 2 SOLUTION INTRAVENOUS at 16:05

## 2023-01-01 RX ADMIN — AMPICILLIN AND SULBACTAM 3 G: 1; 2 INJECTION, POWDER, FOR SOLUTION INTRAMUSCULAR; INTRAVENOUS at 17:41

## 2023-01-01 RX ADMIN — DEXMEDETOMIDINE HYDROCHLORIDE 0.2 MCG/KG/HR: 100 INJECTION, SOLUTION INTRAVENOUS at 15:47

## 2023-01-01 RX ADMIN — MIDAZOLAM HYDROCHLORIDE 2 MG: 1 INJECTION INTRAMUSCULAR; INTRAVENOUS at 15:55

## 2023-01-01 RX ADMIN — MORPHINE SULFATE 10 MG: 10 INJECTION INTRAVENOUS at 15:56

## 2023-01-01 RX ADMIN — Medication 100 MCG: at 14:50

## 2023-01-01 RX ADMIN — FENTANYL CITRATE 100 MCG: 50 INJECTION, SOLUTION INTRAMUSCULAR; INTRAVENOUS at 20:53

## 2023-01-01 RX ADMIN — INSULIN HUMAN 2 UNITS: 100 INJECTION, SOLUTION PARENTERAL at 18:40

## 2023-01-01 RX ADMIN — FENTANYL CITRATE 100 MCG: 50 INJECTION, SOLUTION INTRAMUSCULAR; INTRAVENOUS at 12:44

## 2023-01-01 RX ADMIN — INSULIN HUMAN 2 UNITS: 100 INJECTION, SOLUTION PARENTERAL at 05:11

## 2023-01-01 RX ADMIN — SODIUM BICARBONATE 50 MEQ: 84 INJECTION, SOLUTION INTRAVENOUS at 14:43

## 2023-01-01 RX ADMIN — SENNOSIDES AND DOCUSATE SODIUM 2 TABLET: 50; 8.6 TABLET ORAL at 17:38

## 2023-01-01 RX ADMIN — AMPICILLIN AND SULBACTAM 3 G: 1; 2 INJECTION, POWDER, FOR SOLUTION INTRAMUSCULAR; INTRAVENOUS at 05:18

## 2023-01-01 RX ADMIN — MAGNESIUM SULFATE HEPTAHYDRATE 2 G: 40 INJECTION, SOLUTION INTRAVENOUS at 15:03

## 2023-01-01 RX ADMIN — VASOPRESSIN 0.03 UNITS/MIN: 20 INJECTION INTRAVENOUS at 16:27

## 2023-01-01 RX ADMIN — INSULIN HUMAN 2 UNITS: 100 INJECTION, SOLUTION PARENTERAL at 23:48

## 2023-01-01 RX ADMIN — INSULIN HUMAN 2 UNITS: 100 INJECTION, SOLUTION PARENTERAL at 23:39

## 2023-01-01 RX ADMIN — SENNOSIDES AND DOCUSATE SODIUM 2 TABLET: 50; 8.6 TABLET ORAL at 05:04

## 2023-01-01 RX ADMIN — INSULIN HUMAN 2 UNITS: 100 INJECTION, SOLUTION PARENTERAL at 13:05

## 2023-01-01 RX ADMIN — IOHEXOL 30 ML: 300 INJECTION, SOLUTION INTRAVENOUS at 18:30

## 2023-01-01 RX ADMIN — Medication 100 MCG/HR: at 16:20

## 2023-01-01 RX ADMIN — MORPHINE SULFATE 5 MG: 10 INJECTION INTRAVENOUS at 14:31

## 2023-01-01 RX ADMIN — FAMOTIDINE 20 MG: 20 TABLET, FILM COATED ORAL at 05:26

## 2023-01-01 RX ADMIN — Medication 500 MCG: at 14:46

## 2023-01-01 RX ADMIN — INSULIN HUMAN 2 UNITS: 100 INJECTION, SOLUTION PARENTERAL at 12:11

## 2023-01-01 RX ADMIN — INSULIN HUMAN 2 UNITS: 100 INJECTION, SOLUTION PARENTERAL at 17:46

## 2023-01-01 RX ADMIN — SODIUM CHLORIDE 5 MG/HR: 9 INJECTION, SOLUTION INTRAVENOUS at 22:22

## 2023-01-01 RX ADMIN — FENTANYL CITRATE 100 MCG: 50 INJECTION, SOLUTION INTRAMUSCULAR; INTRAVENOUS at 20:52

## 2023-01-01 RX ADMIN — AZITHROMYCIN DIHYDRATE 500 MG: 250 TABLET ORAL at 12:59

## 2023-01-01 RX ADMIN — INSULIN HUMAN 3 UNITS: 100 INJECTION, SOLUTION PARENTERAL at 18:03

## 2023-01-01 RX ADMIN — SODIUM CHLORIDE 5 MG/HR: 9 INJECTION, SOLUTION INTRAVENOUS at 12:05

## 2023-01-01 RX ADMIN — Medication 400 MCG: at 14:41

## 2023-01-01 RX ADMIN — INSULIN HUMAN 3 UNITS: 100 INJECTION, SOLUTION PARENTERAL at 00:35

## 2023-01-01 RX ADMIN — INSULIN HUMAN 2 UNITS: 100 INJECTION, SOLUTION PARENTERAL at 05:16

## 2023-01-01 RX ADMIN — SODIUM CHLORIDE 1000 MG: 9 INJECTION, SOLUTION INTRAVENOUS at 05:10

## 2023-01-01 RX ADMIN — FENTANYL CITRATE 100 MCG: 50 INJECTION, SOLUTION INTRAMUSCULAR; INTRAVENOUS at 02:34

## 2023-01-01 RX ADMIN — NOREPINEPHRINE BITARTRATE 0.2 MCG/KG/MIN: 1 INJECTION, SOLUTION, CONCENTRATE INTRAVENOUS at 14:40

## 2023-01-01 RX ADMIN — SENNOSIDES AND DOCUSATE SODIUM 2 TABLET: 50; 8.6 TABLET ORAL at 05:08

## 2023-01-01 RX ADMIN — EPINEPHRINE 0.5 MCG/KG/MIN: 1 INJECTION INTRAMUSCULAR; INTRAVENOUS; SUBCUTANEOUS at 14:52

## 2023-01-01 RX ADMIN — FENTANYL CITRATE 100 MCG: 50 INJECTION, SOLUTION INTRAMUSCULAR; INTRAVENOUS at 22:10

## 2023-01-01 RX ADMIN — MAGNESIUM HYDROXIDE 30 ML: 1200 LIQUID ORAL at 05:04

## 2023-01-01 RX ADMIN — AMPICILLIN AND SULBACTAM 3 G: 1; 2 INJECTION, POWDER, FOR SOLUTION INTRAMUSCULAR; INTRAVENOUS at 17:58

## 2023-01-01 RX ADMIN — FENTANYL CITRATE 50 MCG: 50 INJECTION, SOLUTION INTRAMUSCULAR; INTRAVENOUS at 00:20

## 2023-01-01 RX ADMIN — SENNOSIDES AND DOCUSATE SODIUM 2 TABLET: 50; 8.6 TABLET ORAL at 18:35

## 2023-01-01 RX ADMIN — FENTANYL CITRATE 100 MCG: 50 INJECTION, SOLUTION INTRAMUSCULAR; INTRAVENOUS at 04:27

## 2023-01-01 RX ADMIN — AMPICILLIN AND SULBACTAM 3 G: 1; 2 INJECTION, POWDER, FOR SOLUTION INTRAMUSCULAR; INTRAVENOUS at 18:46

## 2023-01-01 RX ADMIN — HUMAN ALBUMIN MICROSPHERES AND PERFLUTREN 3 ML: 10; .22 INJECTION, SOLUTION INTRAVENOUS at 21:24

## 2023-01-01 RX ADMIN — FAMOTIDINE 20 MG: 20 TABLET, FILM COATED ORAL at 05:07

## 2023-01-01 RX ADMIN — SENNOSIDES AND DOCUSATE SODIUM 2 TABLET: 50; 8.6 TABLET ORAL at 05:07

## 2023-01-01 RX ADMIN — MIDAZOLAM HYDROCHLORIDE 2 MG: 1 INJECTION, SOLUTION INTRAMUSCULAR; INTRAVENOUS at 15:55

## 2023-01-01 RX ADMIN — FENTANYL CITRATE 50 MCG: 50 INJECTION, SOLUTION INTRAMUSCULAR; INTRAVENOUS at 16:28

## 2023-01-01 RX ADMIN — FAMOTIDINE 20 MG: 20 TABLET, FILM COATED ORAL at 05:08

## 2023-01-01 RX ADMIN — SODIUM CHLORIDE 1000 MG: 9 INJECTION, SOLUTION INTRAVENOUS at 02:56

## 2023-01-01 RX ADMIN — AZITHROMYCIN DIHYDRATE 500 MG: 250 TABLET ORAL at 12:03

## 2023-01-01 RX ADMIN — AMPICILLIN AND SULBACTAM 3 G: 1; 2 INJECTION, POWDER, FOR SOLUTION INTRAMUSCULAR; INTRAVENOUS at 05:36

## 2023-01-01 RX ADMIN — LORAZEPAM 1 MG: 2 INJECTION INTRAMUSCULAR; INTRAVENOUS at 14:31

## 2023-01-01 RX ADMIN — FENTANYL CITRATE 50 MCG: 50 INJECTION, SOLUTION INTRAMUSCULAR; INTRAVENOUS at 16:21

## 2023-01-01 RX ADMIN — HYDROCORTISONE SODIUM SUCCINATE 100 MG: 100 INJECTION, POWDER, FOR SOLUTION INTRAMUSCULAR; INTRAVENOUS at 15:54

## 2023-01-01 RX ADMIN — IOHEXOL 48 ML: 350 INJECTION, SOLUTION INTRAVENOUS at 23:45

## 2023-01-01 RX ADMIN — SUCCINYLCHOLINE CHLORIDE 50 MG: 20 INJECTION, SOLUTION INTRAMUSCULAR; INTRAVENOUS at 16:05

## 2023-01-01 RX ADMIN — FENTANYL CITRATE 100 MCG: 50 INJECTION, SOLUTION INTRAMUSCULAR; INTRAVENOUS at 16:55

## 2023-01-01 RX ADMIN — FAMOTIDINE 20 MG: 20 TABLET, FILM COATED ORAL at 05:04

## 2023-01-01 RX ADMIN — FENTANYL CITRATE 100 MCG: 50 INJECTION, SOLUTION INTRAMUSCULAR; INTRAVENOUS at 11:43

## 2023-01-01 RX ADMIN — SENNOSIDES AND DOCUSATE SODIUM 2 TABLET: 50; 8.6 TABLET ORAL at 17:51

## 2023-01-01 RX ADMIN — Medication 10 MG: at 05:08

## 2023-01-01 RX ADMIN — SODIUM CHLORIDE, POTASSIUM CHLORIDE, SODIUM LACTATE AND CALCIUM CHLORIDE 1000 ML: 600; 310; 30; 20 INJECTION, SOLUTION INTRAVENOUS at 14:40

## 2023-01-01 ASSESSMENT — PAIN DESCRIPTION - PAIN TYPE
TYPE: ACUTE PAIN

## 2023-01-01 ASSESSMENT — ENCOUNTER SYMPTOMS
SHORTNESS OF BREATH: 0
HEADACHES: 0
BACK PAIN: 0
DIARRHEA: 0
DIARRHEA: 0
ABDOMINAL PAIN: 0
NERVOUS/ANXIOUS: 0
VOMITING: 0
COUGH: 0
FEVER: 0
NAUSEA: 0
NERVOUS/ANXIOUS: 0
COUGH: 0
BACK PAIN: 0
NAUSEA: 0
ABDOMINAL PAIN: 0
HEADACHES: 0
VOMITING: 0
SHORTNESS OF BREATH: 0
FEVER: 0

## 2023-01-01 ASSESSMENT — FIBROSIS 4 INDEX
FIB4 SCORE: 7.73
FIB4 SCORE: 8.86
FIB4 SCORE: 7.73
FIB4 SCORE: 1.42
FIB4 SCORE: 4

## 2023-01-11 ENCOUNTER — APPOINTMENT (RX ONLY)
Dept: URBAN - METROPOLITAN AREA CLINIC 35 | Facility: CLINIC | Age: 83
Setting detail: DERMATOLOGY
End: 2023-01-11

## 2023-01-11 DIAGNOSIS — Z41.9 ENCOUNTER FOR PROCEDURE FOR PURPOSES OTHER THAN REMEDYING HEALTH STATE, UNSPECIFIED: ICD-10-CM

## 2023-01-11 PROCEDURE — ? FILLERS

## 2023-01-11 NOTE — PROCEDURE: FILLERS
Additional Area 2 Location: Border of lips
Dorsal Hands Filler Volume In Cc: 0
Include Cannula Information In Note?: No
Additional Area 1 Location: Oral Commisures
Lot #: [id number]
Additional Area 5 Location: Earlobes
Price (Use Numbers Only, No Special Characters Or $): 036
Filler Comments: 0.6ccL\\n0.4cc R\\nCK4, cannula
Additional Area 4 Location: Scars
Additional Area 3 Location: Fine lines around mouth
Post-Care Instructions: Patient instructed to apply ice to reduce swelling.
Consent: Written consent obtained. Risks include but not limited to bruising, bleeding, blindness, stroke, delayed onset of nodules, irregular texture, ulceration, infection, allergic reaction, scar formation, incomplete augmentation, temporary nature, procedural pain.
Expiration Date (Month Year): 2023-12-11
Use Map Statement For Sites (Optional): Yes
Filler: Juvederm Voluma XC
Detail Level: Detailed
Include Cannula Size?: 25G
Map Statment: See Attach Map for Complete Details
Include Cannula Length?: 1.5 inch
Aspiration Statement: Aspiration was performed prior to injecting site with filler.

## 2023-01-12 NOTE — TELEPHONE ENCOUNTER
Outcome: Left Message for patient to schedule an AWV     Please transfer to [facility] at [phone] when patient returns call.     Attempt # 1  - [place]

## 2023-03-15 ENCOUNTER — APPOINTMENT (RX ONLY)
Dept: URBAN - METROPOLITAN AREA CLINIC 35 | Facility: CLINIC | Age: 83
Setting detail: DERMATOLOGY
End: 2023-03-15

## 2023-03-15 DIAGNOSIS — Z41.9 ENCOUNTER FOR PROCEDURE FOR PURPOSES OTHER THAN REMEDYING HEALTH STATE, UNSPECIFIED: ICD-10-CM

## 2023-03-15 PROCEDURE — ? JUVEDERM ULTRA PLUS XC INJECTION

## 2023-03-15 PROCEDURE — ? BOTOX

## 2023-03-15 ASSESSMENT — LOCATION SIMPLE DESCRIPTION DERM: LOCATION SIMPLE: RIGHT LIP

## 2023-03-15 ASSESSMENT — LOCATION DETAILED DESCRIPTION DERM: LOCATION DETAILED: RIGHT INFERIOR VERMILION LIP

## 2023-03-15 ASSESSMENT — LOCATION ZONE DERM: LOCATION ZONE: LIP

## 2023-03-15 NOTE — PROCEDURE: BOTOX
Forehead Units: 0
Expiration Date (Month Year): 2025-11
Post-Care Instructions: Patient instructed to not lie down for 4 hours after injections and limit physical activity for 24 hours.
Lot #: [id number]
Additional Area 3 Location: [place]
Additional Area 5 Location: perioral
Consent: Verbal and written informed consent were obtained to include the following risks: pain, swelling, bruising, eyelid or eyebrow droop, and lack of visible improvement of wrinkles in the areas treated.  The skin was cleansed with alcohol. Injections were administered with a 32g needle into the following areas:
Dilution (U/0.1 Cc): 1.1
Additional Area 2 Units: 34
Price (Use Numbers Only, No Special Characters Or $): 650.00
Additional Area 2 Location: [place]
Additional Area 6 Location: platysma
Additional Area 4 Location: Bunny lines
Detail Level: Detailed
Additional Area 1 Location: [place]
Additional Area 1 Units: 16

## 2023-03-15 NOTE — PROCEDURE: JUVEDERM ULTRA PLUS XC INJECTION
Lot #: [id number]
Tear Troughs Filler Volume In Cc: 0
Price (Use Numbers Only, No Special Characters Or $): 725
Procedural Text: The filler was administered to the treatment areas noted above.
Map Statment: See Attach Map for Complete Details
Consent: Written consent obtained. Risks include but not limited to bruising, beading, irregular texture, ulceration, infection, allergic reaction, scar formation, incomplete augmentation, temporary nature, procedural pain.
Filler: Juvederm Ultra Plus XC
Include Cannula Information In Note?: No
Detail Level: Detailed
Post-Care Instructions: Patient instructed to apply ice to reduce swelling.
Expiration Date (Month Year): 2023-06-13
Number Of Syringes (Required For Inventory): 1

## 2023-05-10 ENCOUNTER — APPOINTMENT (RX ONLY)
Dept: URBAN - METROPOLITAN AREA CLINIC 35 | Facility: CLINIC | Age: 83
Setting detail: DERMATOLOGY
End: 2023-05-10

## 2023-05-10 DIAGNOSIS — Z41.9 ENCOUNTER FOR PROCEDURE FOR PURPOSES OTHER THAN REMEDYING HEALTH STATE, UNSPECIFIED: ICD-10-CM

## 2023-05-10 PROCEDURE — ? JUVEDERM ULTRA PLUS XC INJECTION

## 2023-05-10 ASSESSMENT — LOCATION DETAILED DESCRIPTION DERM: LOCATION DETAILED: RIGHT INFERIOR VERMILION LIP

## 2023-05-10 ASSESSMENT — LOCATION SIMPLE DESCRIPTION DERM: LOCATION SIMPLE: RIGHT LIP

## 2023-05-10 ASSESSMENT — LOCATION ZONE DERM: LOCATION ZONE: LIP

## 2023-05-10 NOTE — PROCEDURE: JUVEDERM ULTRA PLUS XC INJECTION
Lot #: [id number]
Tear Troughs Filler Volume In Cc: 0
Price (Use Numbers Only, No Special Characters Or $): 694
Procedural Text: The filler was administered to the treatment areas noted above.
Map Statment: See Attach Map for Complete Details
Consent: Written consent obtained. Risks include but not limited to bruising, beading, irregular texture, ulceration, infection, allergic reaction, scar formation, incomplete augmentation, temporary nature, procedural pain.
Filler: Juvederm Ultra Plus XC
Include Cannula Information In Note?: No
Detail Level: Detailed
Post-Care Instructions: Patient instructed to apply ice to reduce swelling.
Expiration Date (Month Year): 2023-06-13
Number Of Syringes (Required For Inventory): 1

## 2023-06-07 ENCOUNTER — APPOINTMENT (RX ONLY)
Dept: URBAN - METROPOLITAN AREA CLINIC 35 | Facility: CLINIC | Age: 83
Setting detail: DERMATOLOGY
End: 2023-06-07

## 2023-06-07 DIAGNOSIS — Z41.9 ENCOUNTER FOR PROCEDURE FOR PURPOSES OTHER THAN REMEDYING HEALTH STATE, UNSPECIFIED: ICD-10-CM

## 2023-06-07 PROCEDURE — ? NOVATHREADS

## 2023-06-07 NOTE — PROCEDURE: NOVATHREADS
Thread Type 1: Smooth Thread, Sharp Needle
Post-Care Instructions (For The Patient Hand-Out): I reviewed with the patient in detail post-care instructions. Patient should apply ice packs multiple times a day for a couple days. They were instructed to call if they have any problems.
Second Anesthesia Volume In Cc (Optional): 0
Add Another Thread?: no
Pre-Procedure Text: The treatment areas were cleaned with alcohol and chlorhexidine. Insertion and exit points were mapped out with the Silhouette ruler and marked with a surgical marker.
Third Anesthesia Type: 1% lidocaine with epinephrine
[facility]
Treatment Number (Optional): 1
Postcare Statement Text: There were no complications and the patient was given postcare instructions and ice packs.
Thread Size 1: 1.5
Detail Level: Zone
Number Of Threads: 8
Procedure Text: An 18 gauge needle was used to create the insertions points and the NovaThICRTecs needles with absorbable sutures were inserted subcutaneously in each direction and advanced through to the exit points on either side. The threads were then tightened and cut adjacent to the exit points and allowed to retract into the subcutaneous space.
Needle Gauge 1: 29G
Price (Use Numbers Only, No Special Characters Or $): 006
Consent was obtained.  The risks and benefits of the procedure were discussed with the patient.  Specifically the risks of swelling, bruising, bleeding, discomfort, infection, damage to nerves, numbness, allergic reactions, pigment changes, partial correction, rippling or dimpling, asymmetry, redness, bumps or nodules, visibility of threads, and scarring were reviewed.

## 2023-06-14 ENCOUNTER — APPOINTMENT (RX ONLY)
Dept: URBAN - METROPOLITAN AREA CLINIC 35 | Facility: CLINIC | Age: 83
Setting detail: DERMATOLOGY
End: 2023-06-14

## 2023-06-14 DIAGNOSIS — L81.4 OTHER MELANIN HYPERPIGMENTATION: ICD-10-CM

## 2023-06-14 DIAGNOSIS — D18.0 HEMANGIOMA: ICD-10-CM

## 2023-06-14 DIAGNOSIS — L82.0 INFLAMED SEBORRHEIC KERATOSIS: ICD-10-CM

## 2023-06-14 DIAGNOSIS — L57.0 ACTINIC KERATOSIS: ICD-10-CM

## 2023-06-14 DIAGNOSIS — D22 MELANOCYTIC NEVI: ICD-10-CM

## 2023-06-14 DIAGNOSIS — Z85.828 PERSONAL HISTORY OF OTHER MALIGNANT NEOPLASM OF SKIN: ICD-10-CM

## 2023-06-14 DIAGNOSIS — R23.3 SPONTANEOUS ECCHYMOSES: ICD-10-CM

## 2023-06-14 DIAGNOSIS — Z71.89 OTHER SPECIFIED COUNSELING: ICD-10-CM

## 2023-06-14 DIAGNOSIS — L82.1 OTHER SEBORRHEIC KERATOSIS: ICD-10-CM

## 2023-06-14 PROBLEM — D22.61 MELANOCYTIC NEVI OF RIGHT UPPER LIMB, INCLUDING SHOULDER: Status: ACTIVE | Noted: 2023-06-14

## 2023-06-14 PROBLEM — D18.01 HEMANGIOMA OF SKIN AND SUBCUTANEOUS TISSUE: Status: ACTIVE | Noted: 2023-06-14

## 2023-06-14 PROCEDURE — 99213 OFFICE O/P EST LOW 20 MIN: CPT | Mod: 25

## 2023-06-14 PROCEDURE — 17111 DESTRUCTION B9 LESIONS 15/>: CPT | Mod: 52

## 2023-06-14 PROCEDURE — ? LIQUID NITROGEN

## 2023-06-14 PROCEDURE — 17003 DESTRUCT PREMALG LES 2-14: CPT | Mod: 59

## 2023-06-14 PROCEDURE — ? COUNSELING

## 2023-06-14 PROCEDURE — ? SUNSCREEN RECOMMENDATIONS

## 2023-06-14 PROCEDURE — 17000 DESTRUCT PREMALG LESION: CPT | Mod: 59

## 2023-06-14 ASSESSMENT — LOCATION DETAILED DESCRIPTION DERM
LOCATION DETAILED: LEFT SUPERIOR FOREHEAD
LOCATION DETAILED: LEFT INFERIOR LATERAL LOWER BACK
LOCATION DETAILED: RIGHT INFERIOR CENTRAL MALAR CHEEK
LOCATION DETAILED: RIGHT DISTAL CALF
LOCATION DETAILED: LEFT MEDIAL MALAR CHEEK
LOCATION DETAILED: MIDDLE STERNUM
LOCATION DETAILED: RIGHT LATERAL BREAST 6-7:00 REGION
LOCATION DETAILED: UPPER STERNUM
LOCATION DETAILED: RIGHT FOREHEAD
LOCATION DETAILED: RIGHT ANTERIOR DISTAL UPPER ARM
LOCATION DETAILED: RIGHT PROXIMAL LATERAL POSTERIOR UPPER ARM
LOCATION DETAILED: LEFT FOREHEAD
LOCATION DETAILED: RIGHT POSTERIOR SHOULDER
LOCATION DETAILED: RIGHT INFERIOR MEDIAL FOREHEAD
LOCATION DETAILED: RIGHT MEDIAL FOREHEAD
LOCATION DETAILED: STERNAL NOTCH
LOCATION DETAILED: LEFT VENTRAL PROXIMAL FOREARM
LOCATION DETAILED: RIGHT PROXIMAL PRETIBIAL REGION
LOCATION DETAILED: RIGHT ANTERIOR PROXIMAL UPPER ARM
LOCATION DETAILED: RIGHT ANTECUBITAL SKIN
LOCATION DETAILED: NASAL DORSUM
LOCATION DETAILED: RIGHT SUPERIOR HELIX
LOCATION DETAILED: RIGHT SUPERIOR MEDIAL MIDBACK
LOCATION DETAILED: LEFT INFERIOR MEDIAL FOREHEAD
LOCATION DETAILED: LEFT INFERIOR FOREHEAD
LOCATION DETAILED: LEFT SUPERIOR MEDIAL LOWER BACK
LOCATION DETAILED: LEFT INFERIOR LATERAL MALAR CHEEK
LOCATION DETAILED: LEFT MEDIAL DISTAL PRETIBIAL REGION
LOCATION DETAILED: RIGHT CENTRAL EYEBROW
LOCATION DETAILED: LEFT MEDIAL SUPERIOR CHEST
LOCATION DETAILED: LEFT INFERIOR CENTRAL MALAR CHEEK
LOCATION DETAILED: RIGHT MEDIAL DISTAL PRETIBIAL REGION
LOCATION DETAILED: RIGHT SUPERIOR FOREHEAD
LOCATION DETAILED: LEFT SUPERIOR UPPER BACK
LOCATION DETAILED: LEFT SUPERIOR MEDIAL FOREHEAD
LOCATION DETAILED: NASAL TIP

## 2023-06-14 ASSESSMENT — LOCATION SIMPLE DESCRIPTION DERM
LOCATION SIMPLE: LEFT PRETIBIAL REGION
LOCATION SIMPLE: RIGHT FOREHEAD
LOCATION SIMPLE: CHEST
LOCATION SIMPLE: RIGHT CHEEK
LOCATION SIMPLE: RIGHT UPPER ARM
LOCATION SIMPLE: NOSE
LOCATION SIMPLE: LEFT CHEEK
LOCATION SIMPLE: RIGHT EYEBROW
LOCATION SIMPLE: RIGHT POSTERIOR UPPER ARM
LOCATION SIMPLE: RIGHT SHOULDER
LOCATION SIMPLE: LEFT UPPER BACK
LOCATION SIMPLE: LEFT LOWER BACK
LOCATION SIMPLE: RIGHT EAR
LOCATION SIMPLE: RIGHT CALF
LOCATION SIMPLE: LEFT FOREARM
LOCATION SIMPLE: RIGHT PRETIBIAL REGION
LOCATION SIMPLE: RIGHT LOWER BACK
LOCATION SIMPLE: LEFT FOREHEAD
LOCATION SIMPLE: RIGHT BREAST

## 2023-06-14 ASSESSMENT — LOCATION ZONE DERM
LOCATION ZONE: NOSE
LOCATION ZONE: FACE
LOCATION ZONE: EAR
LOCATION ZONE: ARM
LOCATION ZONE: TRUNK
LOCATION ZONE: LEG

## 2023-06-14 NOTE — PROCEDURE: LIQUID NITROGEN
Spray Paint Technique: No
Show Applicator Variable?: Yes
Medical Necessity Clause: This procedure was medically necessary because the lesions that were treated were:
Consent: The patient's consent was obtained including but not limited to risks of crusting, scabbing, blistering, scarring, darker or lighter pigmentary change, recurrence, incomplete removal and infection.
Post-Care Instructions: I reviewed with the patient in detail post-care instructions. Patient is to wear sunprotection, and avoid picking at any of the treated lesions. Pt may apply Vaseline to crusted or scabbing areas.
Spray Paint Text: The liquid nitrogen was applied to the skin utilizing a spray paint frosting technique.
Detail Level: Detailed
Medical Necessity Information: It is in your best interest to select a reason for this procedure from the list below. All of these items fulfill various CMS LCD requirements except the new and changing color options.
Number Of Freeze-Thaw Cycles: 2 freeze-thaw cycles
Application Tool (Optional): Cry-AC
Duration Of Freeze Thaw-Cycle (Seconds): 2

## 2023-06-27 NOTE — ED TRIAGE NOTES
"Bib daughter for above complaints.     Chief Complaint   Patient presents with    Foreign Body Swallowed     Pt took 3 pills last night and felt like they got stuck. Pt was able to sleep but woke up still feeling like she has pills in her throat. Pt able to manage secretions and airway.      BP (!) 151/86   Pulse 88   Temp 37 °C (98.6 °F) (Temporal)   Resp 18   Ht 1.702 m (5' 7\")   Wt 53.8 kg (118 lb 9.7 oz)   SpO2 97%   Breastfeeding No   BMI 18.58 kg/m²     "

## 2023-06-28 NOTE — DISCHARGE INSTRUCTIONS
CT scan shows there is nothing lodged in the throat, in the airway or the swallowing tubes.    You are tolerating foods and liquids.  Please continue diet as tolerated

## 2023-06-28 NOTE — ED PROVIDER NOTES
"ER Provider Note    Scribed for Jude Jamse D.O. by Macho Santamaria. 6/27/2023  5:39 PM    Primary Care Provider: Flavio Cooper M.D.    CHIEF COMPLAINT  Chief Complaint   Patient presents with    Foreign Body Swallowed     Pt took 3 pills last night and felt like they got stuck. Pt was able to sleep but woke up still feeling like she has pills in her throat. Pt able to manage secretions and airway.      HPI/ROS  LIMITATION TO HISTORY   None  OUTSIDE HISTORIAN(S):  None    Afsaneh Lyn is a 83 y.o. female who presents to the Emergency Department for a possible foreign body in her throat onset last night. The patient states that she took two pills after dinner and felt as if they became stuck in her throat since. Per the patient, she has been able to eat and drink normally, but has some pain while swallowing. She reports that she feels as if the food she follows has to \"pass over something\" when it goes down. She also experiences discomfort in her throat. Patient denies vomiting and dyspnea. No alleviating or exacerbating factors reported.    ROS as per HPI.    PAST MEDICAL HISTORY  Past Medical History:   Diagnosis Date    Chickenpox     Diverticulosis     Mongolian measles     Hypertension     Hypothyroid     IBS (irritable bowel syndrome)     Influenza     Migraines     Mitral valve prolapse     Mumps      SURGICAL HISTORY  Past Surgical History:   Procedure Laterality Date    ABDOMINAL HYSTERECTOMY TOTAL      Hysterectomy, Total Abdominal    SC BREAST AUGMENTATION WITH IMPLANT      US-NEEDLE CORE BX-BREAST PANEL       FAMILY HISTORY  History reviewed. No pertinent family history.    SOCIAL HISTORY   reports that she has never smoked. She has never used smokeless tobacco. She reports that she does not currently use alcohol after a past usage of about 0.6 oz of alcohol per week. She reports that she does not use drugs.    CURRENT MEDICATIONS  Previous Medications    ASPIRIN-ACETAMINOPHEN-CAFFEINE " "(EXCEDRIN PO)    Take  by mouth. Last dose 3 pm    ESTRADIOL (ESTRACE) 0.5 MG TABLET    Take 0.5 mg by mouth every day.    FLUOXETINE (PROZAC) 40 MG CAPSULE    Take 1 Cap by mouth every day.    LEVOTHYROXINE (SYNTHROID) 150 MCG TAB    TAKE ONE TABLET BY MOUTH EVERY MORNING ON EMPTY STOMACH    LORAZEPAM (ATIVAN) 1 MG TAB    TAKE 1 TABLET BY MOUTH AT BEDTIME FOR 30 DAYS G47.00    OMEGA-3 FATTY ACIDS (FISH OIL CONCENTRATE PO)    Take  by mouth.    VITAMIN D, CHOLECALCIFEROL, PO    Take  by mouth.     ALLERGIES  Sulfa drugs and Macrobid [nitrofurantoin monohydrate macrocrystals]    PHYSICAL EXAM  BP (!) 151/86   Pulse 88   Temp 37 °C (98.6 °F) (Temporal)   Resp 18   Ht 1.702 m (5' 7\")   Wt 53.8 kg (118 lb 9.7 oz)   SpO2 97%   Breastfeeding No   BMI 18.58 kg/m²     General: No acute distress.  HENT: Normocephalic, Mucus membranes are moist, Swallowing own secretions without difficulties.  Chest: Lungs have even and unlabored respirations, Clear to auscultation.   Neck: No anterior cervical tenderness, no deformities.  Cardiovascular: Regular rate and regular rhythm, No peripheral cyanosis.  Abdomen: Non distended.  Neuro: Awake, Conversive, Able to relay recent events.  Psychiatric: Calm and cooperative.     INITIAL ASSESSMENT  Patient feels theres something stuck in her throat under the thyroid area. Patient shows no stridor, shortness of breath, or  vomiting. Patent is able to tolerate food and fluids. Evaluate with CT for possible foreign body.  Concerns for esophageal abrasion from swallowing.     ED Observation Status? Yes; I am placing the patient in to an observation status due to a diagnostic uncertainty as well as therapeutic intensity. Patient placed in observation status at 5:43 PM, 6/27/2023.     Observation plan is as follows: Will monitor respiratory status while results pending.     Upon Reevaluation, the patient's condition has: Improved; and will be discharged.    Patient discharged from ED " Observation status at 7:53 PM (Time) 6/27/2023 (Date).     DIAGNOSTIC STUDIES    Radiology:   The attending emergency physician has independently interpreted the diagnostic imaging associated with this visit and am waiting the final reading from the radiologist.   Preliminary interpretation is as follows: Chest x-ray no acute disease      Radiologist interpretation:   CT-SOFT TISSUE NECK W/O   Final Result      1.  No evidence of foreign body.   2.  No significant mass, adenopathy or fluid collection is seen      DX-CHEST-PORTABLE (1 VIEW)   Final Result         No acute cardiac or pulmonary abnormality is identified.         COURSE & MEDICAL DECISION MAKING     COURSE AND PLAN  5:39 PM - Patient seen and examined at bedside. Discussed plan of care, including imaging to evaluate possible foreign body. Patient agrees to the plan of care. Ordered for CT-Soft tissue neck w/o and DX-Chest to evaluate her symptoms.     7:55 PM - Patient was reevaluated at bedside. Discussed lab and radiology results with the patient. Patient had the opportunity to ask any questions. The plan for discharge was discussed with them and they were told to return for any new or worsening symptoms. She was also informed of the plans for follow up. Patient is understanding and agreeable to the plan for discharge.      ED Summary: Patient feels that there is foreign body in the back of the throat.  This started after she took 2 small laxative pills and Tylenol.  That she has been able to eat and drink without difficulty she has no stridor she has no nausea no vomiting and she is able to tolerate her own secretions.    A CT was done shows no foreign body believe she probably has irritation from swallowing the pills which is causing her sensation of foreign body.  Chest x-ray shows no concerns of aspiration.  She is tolerating liquids and she is stable for discharge home.      DISPOSITION AND DISCUSSIONS  I have discussed management of the patient  with the following physicians and GALE's:  None    Discussion of management with other Hospitals in [place] or appropriate source(s): None      The patient will return for new or worsening symptoms and is stable at the time of discharge.    The patient is referred to a primary physician for blood pressure management, diabetic screening, and for all other preventative health concerns.    DISPOSITION:  Patient will be discharged home in stable condition.    FOLLOW UP:  Flavio Cooper M.D.  [address]  [facility] [address]  [phone]    In 3 days      FINAL DIAGNOSIS  1. Swallowed foreign body, initial encounter      Macho CHUN (Scribe), am scribing for, and in the presence of, Jude James D.O..    Electronically signed by: Macho Santamaria (Scribe), 6/27/2023    Jude CHUN D.O. personally performed the services described in this documentation, as scribed by Macho Santamaria in my presence, and it is both accurate and complete.    The note accurately reflects work and decisions made by me.  Jude James D.O.  6/27/2023  10:17 PM

## 2023-07-05 ENCOUNTER — APPOINTMENT (RX ONLY)
Dept: URBAN - METROPOLITAN AREA CLINIC 35 | Facility: CLINIC | Age: 83
Setting detail: DERMATOLOGY
End: 2023-07-05

## 2023-07-05 DIAGNOSIS — Z41.9 ENCOUNTER FOR PROCEDURE FOR PURPOSES OTHER THAN REMEDYING HEALTH STATE, UNSPECIFIED: ICD-10-CM

## 2023-07-05 PROCEDURE — ? JUVEDERM ULTRA PLUS XC INJECTION

## 2023-07-05 ASSESSMENT — LOCATION ZONE DERM: LOCATION ZONE: LIP

## 2023-07-05 ASSESSMENT — LOCATION DETAILED DESCRIPTION DERM: LOCATION DETAILED: RIGHT INFERIOR VERMILION LIP

## 2023-07-05 ASSESSMENT — LOCATION SIMPLE DESCRIPTION DERM: LOCATION SIMPLE: RIGHT LIP

## 2023-07-05 NOTE — PROCEDURE: JUVEDERM ULTRA PLUS XC INJECTION
Lot #: [id number]
Tear Troughs Filler Volume In Cc: 0
Price (Use Numbers Only, No Special Characters Or $): 313
Procedural Text: The filler was administered to the treatment areas noted above.
Map Statment: See Attach Map for Complete Details
Consent: Written consent obtained. Risks include but not limited to bruising, beading, irregular texture, ulceration, infection, allergic reaction, scar formation, incomplete augmentation, temporary nature, procedural pain.
Filler: Juvederm Ultra Plus XC
Include Cannula Information In Note?: No
Detail Level: Detailed
Post-Care Instructions: Patient instructed to apply ice to reduce swelling.
Expiration Date (Month Year): 2023-12-18
Number Of Syringes (Required For Inventory): 1

## 2023-08-11 PROBLEM — J18.9 PNEUMONIA: Status: ACTIVE | Noted: 2023-01-01

## 2023-08-11 PROBLEM — R65.21 SEPTIC SHOCK (HCC): Status: ACTIVE | Noted: 2023-01-01

## 2023-08-11 PROBLEM — A41.9 SEPTIC SHOCK (HCC): Status: ACTIVE | Noted: 2023-01-01

## 2023-08-11 PROBLEM — J96.01 ACUTE RESPIRATORY FAILURE WITH HYPOXIA (HCC): Status: ACTIVE | Noted: 2023-01-01

## 2023-08-11 PROBLEM — E87.20 LACTIC ACIDOSIS: Status: ACTIVE | Noted: 2023-01-01

## 2023-08-11 NOTE — ASSESSMENT & PLAN NOTE
Initial lactate 9.6 mmol/L, now normalized post resuscitation and with supportive care  Serial lactates until <2

## 2023-08-11 NOTE — ASSESSMENT & PLAN NOTE
Shock is likely combination of cardiogenic/septic?/distributive from PE given RV pressure overload though clot burden was fairly low considering the small segmental PE on CTA.   Also with mod/severe AI and hypertensive with elvated BiV filling pressures estimated on TTE, arguing for cardiogenic component.   LVEF 40-45% with evidence of RV pressure load.   8/12 s/p IVC filter  8/14 Vasopressors off  Unable to anticoagulate due to DAH  Start nicardipine to keep SBP <140 as afterload reduction iso of AI    Serial lactates. Lactates improving.  Follow up blood cultures.  Monitor UOP, goal >30cc/hr.

## 2023-08-11 NOTE — PROCEDURES
Intraosseous Insertion Procedure note     Indication: need access emergently. Shock, acute resp failure  Consent: not obtained due to emergent situation     Procedure:   The left tibia was cannulated with 25mm 15 gauge EZ-IO angiocath. A sterile syringe was then connected to the angiocath with good blood return. The patient tolerated procedure well.     Complications: none  EBL: none     Mustapha Jackson D.O.

## 2023-08-11 NOTE — PROGRESS NOTES
"[facility] TRIAGE OFFICER DIRECT ADMISSION REPORT  Transferring facility: [facility]  Transferring physician: Dr Loza  Chief complaint: SOB  Pertinent history & patient course: patient rx for pna, comes in with severely ill and unable to get vitals and pulse ox. MD was not comfortable managing and would want EMS to intubate patient, given Unasyn and 1L crystalloid. IV access no central line.   Pertinent imaging & lab results: lactate 21  Code Status: full per transferring provider, I personally verified with the transferring provider patient's code status and the transferring provider has confirmed this with the patient.  Further work up or recommendations per triage officer prior to transfer: please get vitals, more IVF and start norepinephrine  Consultants called prior to transfer and pertinent input from consultants: none  Patient accepted for transfer: Yes  Consultants to be called upon arrival: none  Admission status: Inpatient.   Floor requested: ICU  If ICU transfer, name of intensivist case discussed with and pertinent input from critical care: Scot    Please inform the \"Triage Coord.-RTOC\" through [employer] upon arrival of the patient to [facility] for assignment of a hospitalist to perform admission. Reach out to the assigned hospitalist (assigned by RTOC) for any questions or concerns regarding the care of this patient.           "

## 2023-08-11 NOTE — ASSESSMENT & PLAN NOTE
8/11 Intubated by EMS flight crew at OSH. Reportedly pt was in 60% oxygen sat, breathing 40s/min.   Pt was intubated with ET tube #6. No issues en route.   Reportedly CXR showed near complete opacification in bilateral lungs. CD from OSH was being uploaded to [employer]    At [facility], 8/11 S/p ET tube exchange from #6 to #7.5  8/11 CT chest with extensive diffuse pneumonitis.   8/11 Repeat bronch with evidence of bronchoalveolar hemorrhage.   8/11 started on pulse steroids 1000mg IV daily x 3 days  8/14 Worsening hypoxemia and pulmonary infiltrates with stable Hb, suspect pulmonary edema from AI, cardiogenic shock. Add afterload reduction      Overall FIO2/PEEP are improving with good lung compliance. Liberate Tv to 6/kg.    Plan:   Sedation with precedex and fentanyl. Daily SAT/SBT if able  Goal sat >90%, pplat <30  Autoimmune work up: MARISEL, APL labs, ANCA, Anti GBM negative so far  Infectious w/u unrevealing to date, NGTD in bronch or BCxs  Follow up BAL cultures.   Nephrology was consulted for concern of rising creatinine and anuric. May need dialysis

## 2023-08-11 NOTE — PROGRESS NOTES
Tube exchange and bronch with Dr. Jackson at bedside.    TO 1602  1603 tube exchange   1608 positive color change,  bilat  breath sounds,             7.5 ETT 22 @ teeth   1609 bronch start time   1613 50mcg fent  1615 procedure stop time     Repeat CXR

## 2023-08-11 NOTE — ASSESSMENT & PLAN NOTE
Pending BAL culture.   Pt has high risk aspiration given pt recently had episodes of difficulty swallowing of pill   Unasyn   Blood cultures obtained.   COVID/influenza/RSV negative.      Check urine strep pneumo, urine legionella antigen  CTA chest to rule out PE

## 2023-08-11 NOTE — PROCEDURES
BRONCHOSCOPY PROCEDURE NOTE      Date: 8/11/2023  Time: 4:18 PM    Time out: performed. Name, MRN, allergy and procedure were confirmed.     Indication: acute hypoxic resp failure    Consent: not done due to emergency    Procedure: Bronchoscopy with therapeutic aspiration of secretions    Sedation: propofol, fentanyl, 1% lidocaine    Findings:  Respiratory therapy and nursing at bedside throughout procedure. Patient provided sedation and analgesia throughout the procedure. Placed on full ventilator support with an FiO2 of 100% during procedure. Using a fiberoptic bronchoscope, trachea entered via ET tube. ET tube was closed to kaiden and this was pulled to 21 cm.     Kaiden, bilateral main stem, RUL, RLL, RML, DINH, LLL, lingula all appear clean and free of secretions. Bronchial mucosa appears normal, no endobronchial lesions.     Specimen sent to microbiology/pathology: none    Complications:   Patient tolerated procedure well without any difficulties     Estimated blood loss: none    Mustapha Jackson D.O.  Critical Care Medicine

## 2023-08-11 NOTE — PROCEDURES
Central Line Insertion    Date/Time: 8/11/2023 3:15 PM    Performed by: Mustapha Jackson D.O.  Authorized by: Mustapha Jackson D.O.    Consent:     Consent obtained:  Emergent situation  Pre-procedure details:     Hand hygiene: Hand hygiene performed prior to insertion      Sterile barrier technique: All elements of maximal sterile technique followed      Skin preparation:  2% chlorhexidine    Skin preparation agent: Skin preparation agent completely dried prior to procedure    Procedure details:     Patient position:  Flat    Catheter size:  7 Fr    Landmarks identified: yes      Ultrasound guidance: yes      Sterile ultrasound techniques: Sterile gel and sterile probe covers were used      Number of attempts:  1    Successful placement: yes    Post-procedure details:     Post-procedure:  Dressing applied and line sutured    Guidewire: guidewire removal confirmed      Assessment:  Blood return through all ports and free fluid flow    Patient tolerance of procedure:  Tolerated well, no immediate complications

## 2023-08-11 NOTE — CARE PLAN
Problem: Ventilation  Goal: Ability to achieve and maintain unassisted ventilation or tolerate decreased levels of ventilator support  Description: Target End Date:  4 days     Document on Vent flowsheet    1.  Support and monitor invasive and noninvasive mechanical ventilation  2.  Monitor ventilator weaning response  3.  Perform ventilator associated pneumonia prevention interventions  4.  Manage ventilation therapy by monitoring diagnostic test results  Outcome: Not Met     Ventilator Daily Summary    Vent Day #1    ETT: 7.5@21    Ventilator settings: 26/370/+8/60%    Weaning trials: No    Respiratory Procedures: Tube exchange/bronch    Plan: Continue current ventilator settings and wean mechanical ventilation as tolerated per physician orders.

## 2023-08-11 NOTE — CONSULTS
Critical Care/Pulmonary Consultation    Date of Service: 8/11/2023    Date of Admission:  8/11/2023  2:21 PM    Consulting Physician: Mustapha Jackson D.O.    Chief Complaint:  Hypoxic respiratory failure      History of Present Illness: Afsaneh Lyn is a 83 y.o. female with a past medical history of who presented as direct admit from [facility] for acute resp failure, intubated. Pt presented today at OSH for worsening short of breath. Hypoxic in 60-70%, hypotensive. Received 1L fluid bolus. Lactate 21 mg/dl and increased to 87 mg/dl. Unasyn given.      Reportedly, pt went to ED OSH 2 days prior and was diagnosed with pneumonia and sent home with antibiotics. Pt came back with worsening SOB. At ED OSH, pt was profoundly hypoxic, 60-70%, CXR reportedly had near complete opacification of lungs bilaterally.      Upon ICU arrival, pt was intubated, FIO2 100%/ PEEP 8. On ketamine gtt. SBP in 100/60s. Quick bedside POCUS with mod-severely depressed LVEF, dilated RV. TAPSE not able to be well visualized. IVC is full >2 cm.   Pt later developed profound hypotension in 50-60s/30s, 1L bolus LR, 2 amps of bicarb, total 1000 mcg phenylephrine stick was given. Norepinephrine gtt was started. Epinephrine gtt was started. Left tibial IO was established right away due to lack of access. Right IJ central line was emergently placed. ABG 7.33/33/161    Review of Systems   Unable to perform ROS: Intubated       Home Medications    **Home medications have not yet been reviewed for this encounter**         Social History     Tobacco Use    Smoking status: Never    Smokeless tobacco: Never   Vaping Use    Vaping Use: Never used   Substance Use Topics    Alcohol use: Not Currently     Alcohol/week: 0.6 oz     Types: 1 Glasses of wine per week     Comment: wine daily    Drug use: No        Past Medical History:   Diagnosis Date    Chickenpox     Diverticulosis     [place] measles     Hypertension     Hypothyroid      "IBS (irritable bowel syndrome)     Influenza     Migraines     Mitral valve prolapse     Mumps        Past Surgical History:   Procedure Laterality Date    ABDOMINAL HYSTERECTOMY TOTAL      Hysterectomy, Total Abdominal    UT BREAST AUGMENTATION WITH IMPLANT      US-NEEDLE CORE BX-BREAST PANEL         Allergies: Sulfa drugs and Macrobid [nitrofurantoin monohydrate macrocrystals]    No family history on file.    Vitals:    08/11/23 1424 08/11/23 1426 08/11/23 1429 08/11/23 1600   Height:   1.702 m (5' 7.01\")    Weight:  56 kg (123 lb 7.3 oz)  53.3 kg (117 lb 8.1 oz)   Weight % change since last entry.:  0 %  -4.82 %   Pulse: (!) 107      Resp: (!) 31       08/11/23 1603   Height:    Weight:    Weight % change since last entry.:    Pulse: (!) 107   Resp: (!) 32       Physical Examination  General: Intubated, sedated; ill appearing  Neuro/Psych: No apparent sensory change  HEENT: ETT in place; OGT in place  CVS: RRR  Respiratory: clear lung sounds throughout  Abdomen/: soft, no tenderness to palpation  Extremities: no gross deformities  Skin: Multiple areas with bruises     No intake or output data in the 24 hours ending 08/11/23 1650        Recent Labs     08/11/23  1458   MAGNESIUM 2.1     No results for input(s): ALTSGPT, ASTSGOT, ALKPHOSPHAT, TBILIRUBIN, DBILIRUBIN, GAMMAGT, AMYLASE, LIPASE, ALB, PREALBUMIN, GLUCOSE in the last 72 hours.  Recent Labs     08/11/23  1446   ISTATAPH 7.328*   ISTATAPCO2 33.0   ISTATAPO2 161*   ISTATATCO2 18*   [id number] 99   ISTATARTHCO3 17.3   ISTATARTBE -8*   ISTATTEMP 37.2 C   ISTATFIO2 100   ISTATSPEC Arterial   ISTATAPHTC 7.325*   [id number] 162*     DX-CHEST-PORTABLE (1 VIEW)   Final Result      1.  Satisfactory support lines and tubes.   2.  No significant interval change.      DX-CHEST-PORTABLE (1 VIEW)   Final Result      1.  Mild interstitial pulmonary edema.   2.  Interval placement of an endotracheal tube which terminates in satisfactory position at the level of the " aortic arch.   3.  Interval insertion of a central venous catheter which terminates with the tip projecting over the expected region of the mid to distal superior vena cava.   4.  Interval placement of an enteric feeding tube which courses towards the stomach with the distal tip not visualized.      DX-ABDOMEN FOR TUBE PLACEMENT   Final Result      Enteric feeding tube tip terminates in the left abdomen over the expected location of the stomach.          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 Had a dose of unasyn at OSH; Received 100 of bicarb. Bronched. Fluid resuscitation as per sepsis protocol  -SIRS: 3/4 (tachypnea, tachycardia, WBC >12)  -CXR: mild interstitial pulmonary edema  -Full ventilatory support; on fentanyl and precedex  -OGT in place  -RT/O2 protocol  -Duonebs  -Keep FiO2 >92%  -ABCDEF bundle  -COVID/Flu panel: pending  -Legionella ag: pending  -Strep ag: pending  -UA: pending  -Ucx: pending  -Bcx: pending  -CTA chest: pending  -Respi culture: pending  -Unasyn and azithromycin  -NE and  vasopressin to keep MAP >65, SBP >90  -Hydrocortisone  50 x 5 days  -Trend lactic acid: 9.6 --> 6.6  -Procal: 6.35  -I/O  -Monitor UO      Liliam Gates M.D., MD  [facility]  Patient is critically ill.   The patient continues to have: respiratory distress  The vital organ system that is affected is the: Lungs  If untreated there is a high chance of deterioration into: respiratory failure/cardiac arrest  And eventually death.   The critical care that I am providing today is: Vent  The critical that has been undertaken is medically complex.   There has been no overlap in critical care time.   Critical Care Time not including procedures: 80

## 2023-08-11 NOTE — PROCEDURES
Date: 8/11/2023    Procedure: Rapid sequence Intubation    Indication: Switching ETT due to respiratory distress    Physician:  Liliam Gates M.D.    Consent:  Emergent in nature    Procedure:  A time out occurred with the patient name, medical record number, allergies, and consent with right procedure and indication with bedside nurse and if able patient. The patient was connected to the monitor, had pulse oxymetery, and blood pressure monitored and was positioned optimaily. The patient had oxygen applied and used apneic oxygenation technique to limit desaturation.  The patient then was given 15 mg of etomidate for induction and 50 mg of succinylcholine for paralysis. A ETT 7.5mm  was inserted into the mouth with clear view of the vocal cords. The ET passed easily and was secured. Bilateral breath sounds were heard and condensation and color change was present.  A chest xray was ordered and will be reviewed for correct placement.    Liliam Bazzi MD  Critical Care Medicine

## 2023-08-11 NOTE — H&P
Critical Care History & Physical Note    Date of Service  8/11/2023    Code Status  FULL CODE    Chief Complaint  Direct admit from [facility] for further management of acute hypoxic resp failure    History of Presenting Illness  Afsaneh Lyn is a 83 y.o. female who presented as direct admit from [facility] for acute resp failure, intubated. Pt presented today at OSH for worsening short of breath. Hypoxic in 60-70%, hypotensive. Received 1L fluid bolus. Lactate 21 mg/dl and increased to 87 mg/dl. Unasyn given.     Reportedly, pt went to ED OSH 2 days prior and was diagnosed with pneumonia and sent home with antibiotics. Pt came back with worsening SOB. At ED OSH, pt was profoundly hypoxic, 60-70%, CXR reportedly had near complete opacification of lungs bilaterally.     Upon ICU arrival, pt was intubated, FIO2 100%/ PEEP 8. On ketamine gtt. SBP in 100/60s. Quick bedside POCUS with mod-severely depressed LVEF, dilated RV. TAPSE not able to be well visualized. IVC is full >2 cm.   Pt later developed profound hypotension in 50-60s/30s, 1L bolus LR, 2 amps of bicarb, total 1000 mcg phenylephrine stick was given. Norepinephrine gtt was started. Epinephrine gtt was started. Left tibial IO was established right away due to lack of access. Right IJ central line was emergently placed. ABG 7.33/33/161      Review of Systems  Review of Systems   Reason unable to perform ROS: intubated, sedated, RASS -4, unable to perform.       Past Medical History   has a past medical history of Chickenpox, Diverticulosis, [place] measles, Hypertension, Hypothyroid, IBS (irritable bowel syndrome), Influenza, Migraines, Mitral valve prolapse, and Mumps.    Surgical History   has a past surgical history that includes abdominal hysterectomy total; pr breast augmentation with implant; and us-needle core bx-breast panel.     Family History  family history is not on file.   Family history reviewed with  patient. There is no family history that is pertinent to the chief complaint.     Social History   reports that she has never smoked. She has never used smokeless tobacco. She reports that she does not currently use alcohol after a past usage of about 0.6 oz of alcohol per week. She reports that she does not use drugs.    Allergies  Allergies   Allergen Reactions    Sulfa Drugs     Macrobid [Nitrofurantoin Monohydrate Macrocrystals] Nausea     Cramp in legs       Medications  Cannot display prior to admission medications because the patient has not been admitted in this contact.       Physical Exam  BP: ()/()                           Physical Exam  Vitals and nursing note reviewed.   Constitutional:       General: She is in acute distress.      Appearance: She is ill-appearing and toxic-appearing.   HENT:      Head: Normocephalic and atraumatic.      Mouth/Throat:      Mouth: Mucous membranes are dry.      Comments: ET Tube in place  Cardiovascular:      Rate and Rhythm: Normal rate and regular rhythm.      Pulses: Normal pulses.      Heart sounds: Normal heart sounds. No murmur heard.  Pulmonary:      Effort: No respiratory distress.      Breath sounds: Rales present. No wheezing or rhonchi.   Abdominal:      General: There is no distension.      Palpations: Abdomen is soft.      Tenderness: There is no abdominal tenderness. There is no guarding.   Musculoskeletal:      Right lower leg: No edema.      Left lower leg: No edema.   Skin:     Capillary Refill: Capillary refill takes less than 2 seconds.      Coloration: Skin is not jaundiced.      Findings: No bruising or rash.   Neurological:      Comments: Intubated, sedated         Laboratory:          No results for input(s): ALTSGPT, ASTSGOT, ALKPHOSPHAT, TBILIRUBIN, DBILIRUBIN, GAMMAGT, AMYLASE, LIPASE, ALB, PREALBUMIN, GLUCOSE in the last 72 hours.      No results for input(s): NTPROBNP in the last 72 hours.      No results for input(s): TROPONINT in the last  72 hours.    Imaging:  No orders to display       X-Ray:  I have personally reviewed the images and compared with prior images.    Assessment/Plan:  * Septic shock (HCC)- (present on admission)  Assessment & Plan  This is Septic shock Present on admission  SIRS criteria identified on my evaluation include: Tachycardia, with heart rate greater than 90 BPM, Tachypnea, with respirations greater than 20 per minute and Leukocytosis, with WBC greater than 12,000  Clinical indicators of end organ dysfunction include Hypotension with systolic blood pressure less than 90 or MAP less than 65  Indicators of septic shock include: Sepsis present and persistent hypotension as well as initial lactate level result is greater than or equal to 4mmol/L   Sources is: lungs vs others  Sepsis protocol initiated  Crystalloid Fluid Administration: Fluid resuscitation ordered per standard protocol - 30 mL/kg per current or ideal body weight  IV antibiotics as appropriate for source of sepsis  Reassessment: I have reassessed the patient's hemodynamic status    Pt was given 1L fluid at OSH. Giving another 1L fluid LR here  NE gtt to keep MAP >65.   Start vasopressin.   Hydrocortisone 50 Q6H  Unasyn started. Continue unasyn  Obtain blood cultures.   WBC 24K   Lactate 21mg/dl --> 87mg/dl. This is equivalent to 9.6 mmol/L  Repeat lactate here 4.4 mmol/L  Monitor UOP, goal >30cc/hr.       Acute respiratory failure with hypoxia (HCC)  Assessment & Plan  Intubated by EMS flight crew at OSH. Reportedly pt was in 60% oxygen sat, breathing 40s/min.   Pt was intubated with ET tube #6. No issues en route.   Reportedly CXR showed near complete opacification in bilateral lungs. CD from OSH was being uploaded to media    Upon arrival, pt's PIP/Pplat in 40-50s.  CXR here with no opacifications.   Continue full vent support  Sedation with precedx and fentanyl   Goal sat >90%, pplat <30      Lactic acidosis  Assessment & Plan  Initial lactate 9.6  mmol/L  Fluid resuscitation per sepsis protocol. See sepsis section   Serial lactates      Pneumonia  Assessment & Plan  Concern of aspiration given pt recently had episodes of difficulty swallowing of pill   Unasyn   Blood cultures obtained.   Rule out COVID/influenza.   Check urine strep pneumo, urine legionella antigen          VTE prophylaxis: enoxaparin ppx      The patient remains critically ill. Critical care time = 140 mins in directly providing and coordinating critical care and extensive data review. No time overlap and excludes procedures.

## 2023-08-12 PROBLEM — I51.3 RV (RIGHT VENTRICULAR) MURAL THROMBUS: Status: ACTIVE | Noted: 2023-01-01

## 2023-08-12 PROBLEM — R79.89 ELEVATED TROPONIN: Status: ACTIVE | Noted: 2023-01-01

## 2023-08-12 PROBLEM — R74.01 TRANSAMINITIS: Status: ACTIVE | Noted: 2023-01-01

## 2023-08-12 PROBLEM — I82.409 DVT (DEEP VENOUS THROMBOSIS) (HCC): Status: ACTIVE | Noted: 2023-01-01

## 2023-08-12 PROBLEM — N17.9 ACUTE KIDNEY INJURY (HCC): Status: ACTIVE | Noted: 2023-01-01

## 2023-08-12 PROBLEM — J18.9 PNEUMONIA: Status: RESOLVED | Noted: 2023-01-01 | Resolved: 2023-01-01

## 2023-08-12 PROBLEM — I26.99 ACUTE PULMONARY EMBOLISM (HCC): Status: ACTIVE | Noted: 2023-01-01

## 2023-08-12 PROBLEM — R57.9 SHOCK (HCC): Status: ACTIVE | Noted: 2023-01-01

## 2023-08-12 PROBLEM — R04.89 DIFFUSE PULMONARY ALVEOLAR HEMORRHAGE: Status: ACTIVE | Noted: 2023-01-01

## 2023-08-12 NOTE — PROCEDURES
Date of service:  8/11/2023    Title:  Arterial catheter placement - radial artery    Indication:  Septic shock    Narrative:    A time out was performed identifying the correct patient, correct procedure and correct location prior to this procedure.    The left wrist was prepped with chlorhexidine and draped in the usual sterile fashion.  A 20 gauge arterial catheter was placed into the left radial artery under ultrasound guidance using the technique described by Sadia without difficulty or apparent complication.  The guidewire was removed intact.  The line was sutured into place and a sterile dressing was placed over the line.  An outstanding arterial waveform is present on the monitor.  The patient tolerated the procedure quite nicely.  No complications were apparent.      Liliam Bazzi MD  Pulmonary and Critical Care Medicine

## 2023-08-12 NOTE — DIETARY
"Nutrition Support Assessment   Day 1 of admit.  Afsaneh Lyn is a 83 y.o. female with admitting DX of septic shock     Current problem list:  Acute respiratory failure with hypoxia   Pneumonia   Lactic acidosis      Assessment:  Estimated Nutritional Needs: based on:   Height: 170.2 cm (5' 7\")  Weight: 53.4 kg (117 lb 11.6 oz)  Weight to Use in Calculations: 53.4 kg (117 lb 11.6 oz)  Ideal Body Weight: 61.4 kg (135 lb 5.8 oz)  Percent Ideal Body Weight: 87  Body mass index is 18.44 kg/m²., BMI classification: underweight     Calculation/Equation:   REE per JXG=8679 kcals/day (x1.4=1220 kcals/day)  RMR per PSU (VE 9.4 L/min and Tmax x 24: 37.8 C): 1380 kcals/day  Total Calories / day: 1068- 1335 (Calories / k - 25)  Total Grams Protein / day: 53 - 64 (Grams Protein / k.0 - 1.2)     Evaluation:   Consult received for tube feeding assessment. Pt currently intubated in the ICU, nutrition support indicated to meet estimated needs.   Order for NGT though not yet placed.   Current clinical picture and MD progress notes reviewed. Pt with worsening SOB and respiratory failure d/t pneumonia. CTPE showed small subsegmental PE in right upper lobe. S/p BUTCH   Labs () Glu 227 (H), Mag 2.9 (H)  Meds Levo @ 0.02 mcg/kg/min (stopped ), senna,   Skin: no staged wounds or pressure injuries noted   +BM pta  Novasource Renal is an appropriate specialized formula to help meet estimated needs and manage renal labs while in acute care.      Malnutrition Risk: N/A     Recommendations/Plan:  Once NGT placed and placement is confirmed initiate Novasource Renal @ goal rate 25 mL/hr providing 1200 kcals, 54 grams protein and 430 mL free water.   Fluids per MD  Monitor weights and labs  Diet advancements as medically feasible.       RD following         "

## 2023-08-12 NOTE — CARE PLAN
Problem: Ventilation  Goal: Ability to achieve and maintain unassisted ventilation or tolerate decreased levels of ventilator support  Description: Target End Date:  4 days      Document on Vent flowsheet     1.  Support and monitor invasive and noninvasive mechanical ventilation  2.  Monitor ventilator weaning response  3.  Perform ventilator associated pneumonia prevention interventions  4.  Manage ventilation therapy by monitoring diagnostic test results  Outcome: Not Met                           Ventilator Daily Summary     Vent Day #2     Ventilator settings:  APV 26/310/10/80%     Weaning trials:N/A     Respiratory Procedures: Bronchoscopy      Plan: Continue current ventilator settings and wean mechanical ventilation as tolerated per physician orders

## 2023-08-12 NOTE — PROGRESS NOTES
Monitor Summary:  Sinus Rhythm 80-90 with occasional PVC/PAC's, pt did have short run of SVT and self converted out

## 2023-08-12 NOTE — PROGRESS NOTES
"Correction from my previous progress note regarding repeat EKG at 1855.  Per my rate, sinus tach with rate 95, nl axis, short [place] interval, flipped T waves V2-V4, ST elevation <1mm in leads II, III, and aVF.    At 2140 received a voalte message from cardiologist reading ECHO reporting possible thrombus vs TV vegetation.  ECHO result reported:    \"CONCLUSIONS  Mildly to moderately reduced left ventricular systolic function. The left ventricular ejection fraction is visually estimated to be 40-45%.  Flattened septum in systole and diastole (\"D\"-shaped left ventricle) consistent with RV pressure and volume overload.  The right ventricle is dilated with decreased systolic function.    Irregular echogenic density seen in mid cavity, consider thrombus.Enlarged aortic root size measuring of 4.3 cm in diameter above the sinotubular junction.Mobile hyperechoic density seen in ascending aorta, not well seen, artifacts, but cannot unable to exclude dissection.Mildly sclerotic trileaflet aortic valve without significant stenosis and mild to moderate regurgitation.Moderate to severe tricuspid  regurgitation.Estimated right ventricular systolic pressure is 51 mmHg.Right atrial pressure is estimated to be 15 mmHg.Mobile hyperechoic density (0.8 cm x 1.4 cm) seen attached either on tricuspid valve tip or chord, possible thrombus vs vegetation.Trivial pericardial effusion without echo evidence of tamponade.No prior study is available for comparison. Discussed results with intensivist.\"    Spoke with cardiologist over phone at 2141, discussed need for CTPE and evaluation of proximal aortic arch.  Two MDs signed consent form for contrast given that patient has Cr 1.10 previous Cr baseline unknown due to scarcity of data.  Benefit of CTPE with contrast outweighs the risks in the event of possible ELIAS.  We initially considered performing CTA with/without contrast in order to evaluate for PE and also evaluate the proximal aortic arch.  " However at 2159, cardiologist reported to me that there is a BUTCH tech available to perform the BUTCH before the CTPE. CTPE scheduled tentatively at 2300.  Consent form for BUTCH was signed at 2219 and BUTCH had begun.  I spoke with pharmacy about standard dosing with bolus/rebolus for heparin drip given that the TTE reported possible thrombus.  I also discussed the possibility of ACS which is currently being worked up with repeat troponin scheduled for 2200.  Standard heparin drip would be appropriate in this case.  I also spoke with pharmacy the plan for my order was only to mix the heparin drip and to be made available after CTPE provided that all imaging r/o aortic dissection.  Given that aortic dissection cannot be ruled out, I ordered the ICU team that all imaging will be performed before heparin drip is administered.

## 2023-08-12 NOTE — PROGRESS NOTES
4 Eyes Skin Assessment Completed by Adilene RN and NAN Jerome.    Head WDL  Ears WDL  Nose Redness, bloody  Mouth WDL  Neck WDL  Breast/Chest WDL  Shoulder Blades WDL  Spine WDL  (R) Arm/Elbow/Hand Bruising  (L) Arm/Elbow/Hand Bruising  Abdomen WDL  Groin WDL  Scrotum/Coccyx/Buttocks Redness and Blanching  (R) Leg Bruising  (L) Leg Bruising  (R) Heel/Foot/Toe Bruising, mottled  (L) Heel/Foot/Toe Bruising, mottled          Devices In Places Tele Box, Blood Pressure Cuff, Pulse Ox, Arterial Line, ET Tube, OG/NG, and Central Line      Interventions In Place Waffle Overlay and Q2 Turns    Possible Skin Injury No    Pictures Uploaded Into Epic N/A  Wound Consult Placed N/A  RN Wound Prevention Protocol Ordered No

## 2023-08-12 NOTE — ASSESSMENT & PLAN NOTE
Pt has hx of prolong drive (3 hours) to her ranch up in [place]/[facility] multiple times  US LE positive for extensive DVTs bilaterally  Anticoagulation contraindicated due to diffuse alveolar hemorrhage.    8/12 s/p IVC filter

## 2023-08-12 NOTE — ASSESSMENT & PLAN NOTE
Trop in 100-200s. Recent troponin in 8273-8194  ECG with TWI in septal leads with ?mild ST elevation inferiorly. Repeat ECG with no change  Has evidence of significant AI and elevated BiV filling pressures  Pt won't be candidate for heparin gtt or aspirin  Suspect demand ischemia  Continue serial troponins  Suspect underlying cardiac disease, likely need left heart cath when pt is more stable.   Cardiology

## 2023-08-12 NOTE — PROGRESS NOTES
Reviewed EKG at 1758 which ST with rate 98, nl axis, short MD interval,  flipped T waves in V2-V4.  BRONWYN in the inferior leads.  Repeat EKG at 1855 after troponin 174 reported as critical.  Repeat EKG showed no significant changes.

## 2023-08-12 NOTE — PROCEDURES
"Bronchoscopy procedure note    Date of Service: 8/12/2023    Procedure:  Diagnostic and therapeutic bronchoscopy with BAL    Indication: pneumonitis    Physician:  Dr. Leatha Martinez MD    Post Procedure Diagnosis:  1.  DAH    Narrative:  Verbal consent was obtained from daughter and \"time out\" was performed.  A flexible fiberoptic bronchoscope was then inserted through the ETT without difficulty.  All airways were evaluated to the sub-segmental level.  The airway mucosa was normal.  Scant bloody secretions  No endobronchial lesions were seen.  The bronchoscope was then wedged in a segment of the RML bronchus.  60cc of saline was instilled with moderate return of 20 BAL fluid.  The fluid was progressively bloody c/w DAH. The BAL specimen will be sent for appropriate culture, cytology and cell differential.  No immediate complications.  EBL = 0.      Radha Martinez M.D.      "

## 2023-08-12 NOTE — ASSESSMENT & PLAN NOTE
DAH noted on bronch.   CTA chest with small PE in RUL but extensive pneumonitis  Autoimmune work up pending (MARISEL, ANCA, GBM, APL) negative to date  Solumedrol 1000mg IV daily x 3 days, then wean   Creatinine now rising (normal at admission), this is confounded by her shock. Could be ATN  Initial UA with trace occult blood, hyaline cast. Repeat UA with large RBC  May need kidney biopsy  Nephrology was consulted

## 2023-08-12 NOTE — PROGRESS NOTES
"Reviewed CTPE which showed small subsegmental PE in the right upper lobe.  ICU doc reached out to patients family and transitioned to DNAR/DNI.  Had discussed risks/benefits for thrombolytics.  Patient does not have any On CT scan, lungs shows diffuse GGO concerning for ARDS vs pneumonitis vs pulmonary alveolar hemorrhage.   Bronch was performed.  Aspirate collected was reddish-pink in color concerning for pulmonary alveolar hemorrhage.  Chemical DVT ppx was held at 1842 pending further work up.  D/faizan heparin drip which was initially been ordered and on standby due to concern of cardioembolic phenomenon. BUTCH showed \"multiple irregular echogenic densities seen in right ventricle that would seem most consistent with thrombus.\" maging did not show radiologic signs consistent without aortic dissection. Due to the pulmonary alveolar hemorrhage noted on bronchoscopy, anticoagulation is contraindicated.  Will treat with pulse dosed steroids. Ordered sendout labs for ILD, hypersensitivity panels.  Ordered workup for antiphospholipid syndrome.        #Pulmonary Alveolar Hemorrhage  #Small Subsegmental Pulmonary Embolism  #Intracardiac thrombus  #Antiphospholipid syndrome?  *suspected autoimmune disease in the setting of confirmed clots (intracardiac and PE) suggests possibility of anti-phospholipid syndrome which needs to be worked up further. Also, SLE, [place] Past[place]'s syndrome, Wegener's, microscopic polyangitis.  Autoimmune disorders have a bimodal pattern of distrubution with respect to age.  - d/faizan heparin drip   - unheld and d/faizan chemical DVT ppx  - d/faizan hydrocortisone  - ordered 1g methylprednisolone daily x 3 days  - discussed with pharmacy to transition from hydrocortisone to methylprednisolone  - ESR  - CRP  - send out labs: ILD panel, hypersensitivity pneumonitis panel, hypersensitivity pneumonitis extended panel  - GBM AB  - MARISEL   - ANCA   - anti-cardiolipin  - lupus anticoagulant  - send out lab: anti-beta-2 " glycoprotein  - AM UA  - US DVT

## 2023-08-12 NOTE — CARE PLAN
The patient is Watcher - Medium risk of patient condition declining or worsening    Shift Goals  Clinical Goals: maintain hemodynamics  Patient Goals: CJ  Family Goals: CT scan tonight    Progress made toward(s) clinical / shift goals:    Problem: Safety - Medical Restraint  Goal: Remains free of injury from restraints (Restraint for Interference with Medical Device)  Outcome: Progressing  Note: Restraint charting and checks in place     Problem: Pain - Standard  Goal: Alleviation of pain or a reduction in pain to the patient’s comfort goal  Outcome: Progressing  Note: Pain controlled with fentanyl gtt     Problem: Hemodynamics  Goal: Patient's hemodynamics, fluid balance and neurologic status will be stable or improve  Outcome: Progressing  Note: Pt with decreased need for vasopressors since start of shift       Patient is not progressing towards the following goals:

## 2023-08-12 NOTE — PROGRESS NOTES
Dr. Loo at bedside, plan for BUTCH and CT to RO dissection, heparin gtt ordered, will wait for results of CT before initiating.

## 2023-08-12 NOTE — PROCEDURES
Procedure performed: Transesophageal Echo     : Darlene Dangelo MD    Assistant: None    Anesthesia: Patient is already intubated and sedated    Indication: rule out aortic dissection    Preprocedural Diagnosis:   Possible RV thrombus vs tricuspid valve vegetation, ? Dissection flap  Postprocedural Diagnosis: No aortic dissection, echogenic density RV possible thrombus vs vegetation    Description of procedure:    Bedside transesophageal echo was performed in the ICU.  Patient is critically ill.  Procedure was deemed urgent and consent obtained with 2 agreeing physician. A BUTCH probe was inserted and study performed.  No immediate complications noted.    Conclusion:    Ascending aortic aneurysm without clear evidence of dissection.    Multiple echogenic densities seen in right ventricle more consistent with thrombus then vegetation, but cannot rule out vegetation.  Moderate to severe aortic and tricuspid regurgitation.    See BUTCH report for full report.    EBL: None    Complications: None apparent      Electronically signed: Darlene Dangelo MD  Cardiologist [facility]

## 2023-08-12 NOTE — ASSESSMENT & PLAN NOTE
RV thrombus noted in TTE and BUTCH.   No vegetation valve.   APL diagnosis was entertained. Pending APL labs  No known hx of APL in the family.   Anticoagulation contraindicated due to DAH

## 2023-08-12 NOTE — CONSULTS
[facility] -  CONSULTATION NOTE               Author: Liborio Ronquillo M.D. Date & Time: 8/12/2023  2:18 PM       REASON FOR CONSULTATION:   ELIAS    CHIEF COMPLAINT:   Unable to assess given patient's clinical status     HISTORY OF PRESENT ILLNESS:    Afsaneh Lyn is a 83 y.o. female who presented as direct admit from [facility] for acute resp failure, intubated. Pt presented today at OSH for worsening short of breath. Hypoxic in 60-70%, hypotensive. Received 1L fluid bolus. Lactate 21 mg/dl and increased to 87 mg/dl. Unasyn given.      Reportedly, pt went to ED OSH 2 days prior and was diagnosed with pneumonia and sent home with antibiotics. Pt came back with worsening SOB. At ED OSH, pt was profoundly hypoxic, 60-70%, CXR reportedly had near complete opacification of lungs bilaterally.      Upon ICU arrival, pt was intubated, FIO2 100%/ PEEP 8. On ketamine gtt. SBP in 100/60s. Quick bedside POCUS with mod-severely depressed LVEF, dilated RV. TAPSE not able to be well visualized. IVC is full >2 cm.   Pt later developed profound hypotension in 50-60s/30s, 1L bolus LR, 2 amps of bicarb, total 1000 mcg phenylephrine stick was given. Norepinephrine gtt was started. Epinephrine gtt was started. Left tibial IO was established right away due to lack of access. Right IJ central line was emergently placed. ABG 7.33/33/161    Scr on admission was 1.1 and yael to 1.55 and now 2.15 prompting Nephrology consultation. BUTCH results as below. Patient is currently on vasopressor support.   She also received CT Angio with results below.    BUTCH:  Ascending aortic aneurysm without clear evidence of dissection.   Multiple echogenic densities seen in right ventricle more consistent with  thrombus then vegetation, but cannot rule out vegetation.  Moderate to severe aortic and tricuspid regurgitation.    CT Angio:  1.  Single small pulmonary embolus in a segmental pulmonary artery to the  "right upper lobe.     2.  Extensive groundglass opacification throughout the lung fields consistent with pneumonitis or pulmonary edema.     3.  Small bilateral pleural effusions.     4.  Significant right heart strain.      REVIEW OF SYSTEMS:    10 point ROS was performed and is as per HPI or otherwise negative    PAST MEDICAL HISTORY:   Past Medical History:   Diagnosis Date    Chickenpox     Diverticulosis     Uz[place] measles     Hypertension     Hypothyroid     IBS (irritable bowel syndrome)     Influenza     Migraines     Mitral valve prolapse     Mumps        PAST SURGICAL HISTORY:   Past Surgical History:   Procedure Laterality Date    ABDOMINAL HYSTERECTOMY TOTAL      Hysterectomy, Total Abdominal    [place] BREAST AUGMENTATION WITH IMPLANT      US-NEEDLE CORE BX-BREAST PANEL         FAMILY HISTORY:   No family history on file.    SOCIAL HISTORY:   Social History     Tobacco Use   Smoking Status Never   Smokeless Tobacco Never     Social History     Substance and Sexual Activity   Alcohol Use Not Currently    Alcohol/week: 0.6 oz    Types: 1 Glasses of wine per week    Comment: wine daily     Social History     Substance and Sexual Activity   Drug Use No       HOME MEDICATIONS:   Reviewed and documented in chart    LABORATORY STUDIES:   Recent Labs     08/11/23  1458 08/12/23  0113 08/12/23  1035   SODIUM 143 137 138   POTASSIUM 3.7 3.7 3.9   CHLORIDE 100 100 101   CO2 23 20 20   GLUCOSE 214* 227* 186*   BUN 20 34* 46*   CREATININE 1.10 1.55* 2.15*   CALCIUM 8.9 8.5 8.8       ALLERGIES:  Sulfa drugs and Macrobid [nitrofurantoin monohydrate macrocrystals]    VS:  /61   Pulse 97   Temp 37.6 °C (99.7 °F) (Bladder)   Resp 19   Ht 1.702 m (5' 7\")   Wt 53.4 kg (117 lb 11.6 oz)   SpO2 94%   BMI 18.44 kg/m²     Physical Exam  Vitals and nursing note reviewed.     Constitutional:       General: She is not in acute distress.     Appearance: She is ill-appearing and toxic-appearing. She is not diaphoretic. "   HENT:      Head: Normocephalic.      Mouth/Throat:      Mouth: Mucous membranes are dry.      Comments: ET tube in place  Cardiovascular:      Rate and Rhythm: Normal rate and regular rhythm.      Pulses: Normal pulses.      Heart sounds: Normal heart sounds. No murmur heard.  Pulmonary:      Effort: No respiratory distress.      Breath sounds: Normal breath sounds. No wheezing, rhonchi or rales.   Abdominal:      General: There is no distension.      Palpations: Abdomen is soft.      Tenderness: There is no abdominal tenderness. There is no guarding.   Musculoskeletal:      Cervical back: Neck supple.      Right lower leg: No edema.      Left lower leg: No edema.   Skin:     Capillary Refill: Capillary refill takes less than 2 seconds.      Coloration: Skin is not jaundiced.      Findings: No bruising or rash.   Neurological:      General: No focal deficit present.     FLUID BALANCE:  In: 1131.2 [I.V.:430.8; Other:80]  Out: 540     IMAGING:  All imaging reviewed from admission to present day    IMPRESSION:  # ELIAS with Anuria- multifactorial with Hypoperfusion/Septic Shock, and JUAN  # Acidosis  # Hypotension now off vasopressor support.  # Septic Shock  # Diffuse Pneumonitis  # Single Segmental Pulm Embolism  # Pulmonary Alveolar Hemorrhage  # Intracardiac thrombus - serologies sent  # Acute Respiratory Failure with Hypoxia on Vent support.     PLAN:  - No compelling indication for RRT today but may need it in the next 24-48 hrs  - Daily evaluation for RRT needs  - Dose all meds per eGFR < 15  - Keep MAP >/= to 65  - Monitor IO    Thank you for the consultation!        Total CCM > 65min

## 2023-08-12 NOTE — PROGRESS NOTES
Critical Care Progress Note    Date of admission  8/11/2023    Chief Complaint  Afsaneh Lyn is a 83 y.o. female who presented as direct admit from [facility] for acute resp failure, intubated. Pt presented today at OSH for worsening short of breath. Hypoxic in 60-70%, hypotensive. Received 1L fluid bolus. Lactate 21 mg/dl and increased to 87 mg/dl. Unasyn given. Pt was intubated by flight crew before transfer to [facility]    Upon ICU arrival, pt was intubated, FIO2 100%/ PEEP 8. On ketamine gtt. SBP in 100/60s. Quick bedside POCUS with mod-severely depressed LVEF, dilated RV. TAPSE not able to be well visualized. IVC is full >2 cm. Pt later developed profound hypotension in 50-60s/30s, 1L bolus LR, 2 amps of bicarb, total 1000 mcg phenylephrine stick was given. Norepinephrine gtt was started. Epinephrine gtt was started. Left tibial IO was established right away due to lack of access. Right IJ central line was emergently placed. ABG 7.33/33/161     Reportedly, pt went to ED OSH 2 days prior and was diagnosed with pneumonia and sent home with antibiotics. Pt came back with worsening SOB. At ED OSH, pt was profoundly hypoxic, 60-70%, CXR reportedly had near complete opacification of lungs bilaterally.      Hospital Course  8/11 direct admit from [facility]    Interval Problem Update  Reviewed last 24 hour events:  Remains critically ill   Pt sedated dexmedetomidine gtt and fentanyl prn  Remains on full vent support, 50%/PEEP 10  SAT off precedex. RASS -2  NE gtt off. Vasopressin off  UOP 40cc overnight.   CT chest     Review of Systems  Review of Systems   Constitutional:  Negative for fever and malaise/fatigue.   Respiratory:  Negative for cough and shortness of breath.    Cardiovascular:  Negative for chest pain and leg swelling.   Gastrointestinal:  Negative for abdominal pain, diarrhea, nausea and vomiting.   Musculoskeletal:  Negative for back pain.   Neurological:   Negative for headaches.   Psychiatric/Behavioral:  The patient is not nervous/anxious.    All other systems reviewed and are negative.       Vital Signs for last 24 hours   Temp:  [37.8 °C (100 °F)-38 °C (100.4 °F)] 37.8 °C (100 °F)  Pulse:  [] 92  Resp:  [17-45] 22  BP: ()/() 114/66  SpO2:  [88 %-100 %] 94 %    Hemodynamic parameters for last 24 hours       Respiratory Information for the last 24 hours  Vent Mode: APVCMV  Rate (breaths/min): 26  Vt Target (mL): 310  PEEP/CPAP: 10  MAP: 15  Control VTE (exp VT): 308    Physical Exam   Physical Exam  Vitals and nursing note reviewed.   Constitutional:       General: She is not in acute distress.     Appearance: She is ill-appearing and toxic-appearing. She is not diaphoretic.   HENT:      Head: Normocephalic.      Mouth/Throat:      Mouth: Mucous membranes are dry.      Comments: ET tube in place  Cardiovascular:      Rate and Rhythm: Normal rate and regular rhythm.      Pulses: Normal pulses.      Heart sounds: Normal heart sounds. No murmur heard.  Pulmonary:      Effort: No respiratory distress.      Breath sounds: Normal breath sounds. No wheezing, rhonchi or rales.   Abdominal:      General: There is no distension.      Palpations: Abdomen is soft.      Tenderness: There is no abdominal tenderness. There is no guarding.   Musculoskeletal:      Cervical back: Neck supple.      Right lower leg: No edema.      Left lower leg: No edema.   Skin:     Capillary Refill: Capillary refill takes less than 2 seconds.      Coloration: Skin is not jaundiced.      Findings: No bruising or rash.   Neurological:      General: No focal deficit present.         Medications  Current Facility-Administered Medications   Medication Dose Route Frequency Provider Last Rate Last Admin    methylPREDNISolone sod succ (Solu-Medrol) 1,000 mg in  mL IVPB  1 g Intravenous DAILY Radha Martinez M.D.   Stopped at 08/12/23 0355    labetalol (Normodyne/Trandate) injection  10 mg  10 mg Intravenous Q4HRS PRN Liliam Gates M.D.        ondansetron (Zofran) syringe/vial injection 4 mg  4 mg Intravenous Q4HRS PRN Liliam Gates M.D.        Respiratory Therapy Consult   Nebulization Continuous RT Liliam Gates M.D.        famotidine (Pepcid) tablet 20 mg  20 mg Enteral Tube Q12HRS Liliam Gates M.D.   20 mg at 08/12/23 0526    Or    famotidine (Pepcid) injection 20 mg  20 mg Intravenous Q12HRS Liliam Gates M.D.   20 mg at 08/11/23 1803    senna-docusate (Pericolace Or Senokot S) 8.6-50 MG per tablet 2 Tablet  2 Tablet Enteral Tube BID Liliam Gates M.D.   2 Tablet at 08/12/23 0526    And    polyethylene glycol/lytes (Miralax) PACKET 1 Packet  1 Packet Enteral Tube QDAY PRN Liliam Gates M.D.        And    magnesium hydroxide (Milk Of Magnesia) suspension 30 mL  30 mL Enteral Tube QDAY PRN Liliam Gates M.D.        And    bisacodyl (Dulcolax) suppository 10 mg  10 mg Rectal QDAY PRN Liliam Gates M.D. MD Alert...ICU Electrolyte Replacement per Pharmacy   Other PHARMACY TO DOSE Liliam Gates M.D.        lidocaine (Xylocaine) 1 % injection 2 mL  2 mL Tracheal Tube Q30 MIN PRN Liliam Gates M.D.        fentaNYL (Sublimaze) injection 100 mcg  100 mcg Intravenous Q15 MIN PRN Liliam Gates M.D.   50 mcg at 08/12/23 0020    And    fentaNYL (Sublimaze) injection 200 mcg  200 mcg Intravenous Q15 MIN PRN Liliam Gates M.D.        And    fentaNYL (SUBLIMAZE) 50 mcg/mL in 50mL (Continuous Infusion)   Intravenous Continuous Liliam Gates M.D.   Stopped at 08/12/23 0520    And    dexmedetomidine (PRECEDEX) 400 mcg/100mL NS premix infusion  0.1-1.5 mcg/kg/hr Intravenous Continuous Liliam Gates M.D.   Stopped at 08/12/23 0428    ipratropium-albuterol (DUONEB) nebulizer solution  3 mL Nebulization Q4H PRN  (RT) Liliam Gates M.D.        acetaminophen (Tylenol) tablet 650 mg  650 mg Enteral Tube Q6HRS PRN Liliam Gates M.D.        ondansetron (Zofran ODT) dispertab 4 mg  4 mg Enteral Tube Q4HRS PRN Liliam Gates M.D.        ampicillin/sulbactam (Unasyn) 3 g in  mL IVPB  3 g Intravenous Q6HRS Liliam Gates M.D.   Stopped at 08/12/23 0606    azithromycin (ZITHROMAX) 500 mg in D5W 250 mL IVPB premix  500 mg Intravenous Q EVENING Liliam Gates M.D.   Stopped at 08/11/23 1909    EPINEPHrine (Adrenalin) infusion 4 mg/250 mL (premix)  0-0.5 mcg/kg/min Intravenous Continuous [email or web address]   Stopped at 08/11/23 1458    Pharmacy Consult: Enteral tube insertion - review meds/change route/product selection   Other PHARMACY TO DOSE [email or web address] [email or web address]        vasopressin (Vasostrict) 20 Units in  mL Infusion  0.03 Units/min Intravenous Continuous Liliam Gates M.D.   Stopped at 08/11/23 2330    norepinephrine (Levophed) infusion 32 mg/500 mL (premix)  0-1 mcg/kg/min Intravenous Continuous Walker Loo Jr. D.O. 5 mL/hr at 08/12/23 0623 0.1 mcg/kg/min at 08/12/23 0623       Fluids    Intake/Output Summary (Last 24 hours) at 8/12/2023 0637  Last data filed at 8/12/2023 0600  Gross per 24 hour   Intake 1131.22 ml   Output 540 ml   Net 591.22 ml       Laboratory  Recent Labs     08/11/23  1446 08/12/23  0300   ISTATAPH 7.328* 7.430   ISTATAPCO2 33.0 34.7   ISTATAPO2 161* 326*   ISTATATCO2 18* 24   UFYUSCJ7TFU 99 100*   ISTATARTHCO3 17.3 23.0   ISTATARTBE -8* -1   ISTATTEMP 37.2 C 37.7 C   ISTATFIO2 100 100   ISTATSPEC Arterial Arterial   ISTATAPHTC 7.325* 7.420   OINBYINU8WI 162* 329*         Recent Labs     08/11/23  1458 08/12/23  0113   SODIUM 143 137   POTASSIUM 3.7 3.7   CHLORIDE 100 100   CO2 23 20   BUN 20 34*   CREATININE 1.10 1.55*   MAGNESIUM 2.1 2.9*   PHOSPHORUS  --  4.5   CALCIUM 8.9 8.5     Recent Labs     08/11/23  1458  08/12/23  0113   ALTSGPT 44  --    ASTSGOT 54*  --    ALKPHOSPHAT 145*  --    TBILIRUBIN 0.6  --    GLUCOSE 214* 227*     Recent Labs     08/11/23  1458 08/12/23  0409   WBC 28.2* 16.1*   NEUTSPOLYS  --  89.80*   LYMPHOCYTES  --  4.60*   MONOCYTES  --  4.70   EOSINOPHILS  --  0.00   BASOPHILS  --  0.10   ASTSGOT 54*  --    ALTSGPT 44  --    ALKPHOSPHAT 145*  --    TBILIRUBIN 0.6  --      Recent Labs     08/11/23  1458 08/11/23  2225 08/12/23  0409   RBC 3.31*  --  3.43*   HEMOGLOBIN 10.4*  --  10.9*   HEMATOCRIT 31.6*  --  32.0*   PLATELETCT 384  --  165   PROTHROMBTM  --  19.1*  --    APTT  --  34.5  --    INR  --  1.65*  --        Imaging  X-Ray:  I have personally reviewed the images and compared with prior images.  CT:    Reviewed    Assessment/Plan  * Acute respiratory failure with hypoxia (HCC)  Assessment & Plan  Intubated by EMS flight crew at OSH. Reportedly pt was in 60% oxygen sat, breathing 40s/min.   Pt was intubated with ET tube #6. No issues en route.   Reportedly CXR showed near complete opacification in bilateral lungs. CD from OSH was being uploaded to media  S/p ET tube exchange from #6 to #7.5  CT chest with extensive diffuse pneumonitis.   Repeat bronch with evidence of bronchoalveolar hemorrhage.   Started on pulse steroids 1000mg IV daily x 3 days  Likely the main  is pneumonitis/concomitant PE +/- infectious (aspiration vs others).   Sedation with precedex and fentanyl  Goal sat >90%, pplat <30  Autoimmune work up: MARISEL, APL labs, ANCA, Anti GBM  Infectious w/u pending. COVID/flu negative. Cocci pending. HIV, hepatitis/fungitell  Follow up BAL cultures.   Nephrology was consulted    Diffuse pulmonary alveolar hemorrhage  Assessment & Plan  DAH noted on bronch.   CTA chest with small PE in RUL but extensive pneumonitis  Autoimmune work up pending (MARISEL, ANCA, GBM, APL)  Solumedrol 1000mg IV daily x 3 days, then wean   Creatinine now rising (normal at admission), this is confounded by her  shock. Could be ATN  UA with trace occult blood, hyaline cast.   Nephrology was consulted.       Acute pulmonary embolism (HCC)  Assessment & Plan  Small segmental PE in RUL noted on CTA.   Anticoagulation contraindicated due to DAH  Will place IVC filter    Shock (HCC)- (present on admission)  Assessment & Plan    SIRS criteria identified on my evaluation include: Tachycardia, with heart rate greater than 90 BPM, Tachypnea, with respirations greater than 20 per minute and Leukocytosis, with WBC greater than 12,000  Clinical indicators of end organ dysfunction include Hypotension with systolic blood pressure less than 90 or MAP less than 65  Indicators of septic shock include: Sepsis present and persistent hypotension as well as initial lactate level result is greater than or equal to 4mmol/L   Sources is: lungs vs others  Sepsis protocol initiated  Crystalloid Fluid Administration: Fluid resuscitation ordered per standard protocol - 30 mL/kg per current or ideal body weight  IV antibiotics as appropriate for source of sepsis  Reassessment: I have reassessed the patient's hemodynamic status  Lactate 21mg/dl --> 87mg/dl. This is equivalent to 9.6 mmol/L. Repeat lactate here 4.4 mmol/L    Shock is likely combination of cardiogenic/septic?/distributive from PE though clot burden was fairly low considering the small segmental PE on CTA.   LVEF 40-45% with evidence of RV pressure load.   S/p total 2L fluid resuscitations at admission  On NE and vasopressin gtt on 8/11, weaned off. Keep MAP >65  Serial lactates. Lactates improving.  Unasyn   Follow up blood cultures.  Monitor UOP, goal >30cc/hr.       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    Plan for IVC filter today by IR    Acute kidney injury (HCC)  Assessment  & Plan  Rising creatinine today with anuria.   Etiology  - multifactorial. DDx include ATN from profound shock vs preexisting pulm renal syndrome vs drug induced  Monitor UOP  Monitor electrolytes, acid-base disturbances.   Nephrology Dr. Vargas was consulted.     Lactic acidosis  Assessment & Plan  Initial lactate 9.6 mmol/L  Fluid resuscitation per sepsis protocol. See sepsis section   Serial lactates until <2      Elevated troponin  Assessment & Plan  Trop in 100-200s. Recent troponin in 1100  Continue serial troponins.   ECG with TWI in septal leads. Repeat ECG with no change  Pt won't be candidate for heparin gtt or aspirin  Suspect demand ischemia        RV (right ventricular) mural thrombus  Assessment & Plan  RV thrombus noted in TTE and BUTCH.   No vegetation valve.   APL diagnosis was entertained. Pending APL labs  No known hx of APL in the family.   Anticoagulation contraindicated due to DAH    Transaminitis  Assessment & Plan  Likely related to shock. Monitor.   Total bilirubin normal         VTE:  Contraindicated  Ulcer: H2 Antagonist  Lines: Central Line  Ongoing indication addressed, Arterial Line  Ongoing indication addressed, and Arizmendi Catheter  Ongoing indication addressed    I have performed a physical exam and reviewed and updated ROS and Plan today (8/12/2023). In review of yesterday's note (8/11/2023), there are no changes except as documented above.     Discussed patient condition and risk of morbidity and/or mortality with RN, RT, Pharmacy, Charge nurse / hot rounds, and Patient    Family updated. All questions were answered    The patient remains critically ill.  Critical care time = 80 minutes in directly providing and coordinating critical care and extensive data review.  No time overlap and excludes procedures.

## 2023-08-12 NOTE — PROGRESS NOTES
Care assumed from Dr. Loo.  Patient found to have thrombi versus endocarditis on TTE.  Dr. Dangelo performing BUTCH which redemonstrated the abnormalities, mostly in the right atrium, most likely thrombus but less likely endocarditis versus tumor.  CT PE then returned with a single segmental PE and diffuse pneumonitis.  I think it is unlikely that this PE is the cause of her hemodynamic compromise so will not push thrombolytics.  I spoke with patient's daughter, she does not have any contraindications to fibrinolytics other than her advanced age.  I also updated her about the pneumonitis and we discussed repeating a bronchoscopy with BAL.  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  Ordered serology for vasculitis, APLS, ILD panel, HP panel and started pulse dose steroids with Solu-Medrol 1 g daily for 3 days.  Given the alveolar hemorrhage, heparin is contraindicated.  Patient will need an IVC filter first thing in the morning if possible.  IR consult placed. This certainly is challenging situation. We essentially cannot treat the intracardiac thrombi with anticoagulation with ongoing DAH.  Could consider thrombectomy, however on CT the atrial thrombi are not well seen so it would be a difficult procedure without being able to see the clot.      The patient remains critically ill on mechanical ventilation, vasopressor infusions.  Critical care time = 60 minutes in directly providing and coordinating critical care and extensive data review.  No time overlap and excludes procedures.

## 2023-08-12 NOTE — ASSESSMENT & PLAN NOTE
Continue Oliguria  Etiology  - multifactorial. DDx include ATN from profound shock vs preexisting pulm renal syndrome vs drug induced  Monitor UOP  Monitor electrolytes, acid-base disturbances.   Nephrology following

## 2023-08-12 NOTE — PROGRESS NOTES
1739 TO for A Line insertion with Dr. Loo and Dr. Bazzi.   ST elevation noted on lead II on bedside monitor. Stat EKG ordered.

## 2023-08-12 NOTE — ASSESSMENT & PLAN NOTE
Small segmental PE in RUL noted on CTA.   US LE + extensive bilateral DVT  Anticoagulation contraindicated due to DAH  8/12 s/p IVC filter.

## 2023-08-13 PROBLEM — Z71.89 GOALS OF CARE, COUNSELING/DISCUSSION: Status: ACTIVE | Noted: 2023-01-01

## 2023-08-13 PROBLEM — G93.40 ENCEPHALOPATHY ACUTE: Status: ACTIVE | Noted: 2023-01-01

## 2023-08-13 NOTE — OR SURGEON
Immediate Post- Operative Note        Findings: dvt pe tenuous cp status      Procedure(s): ivc filter      Estimated Blood Loss: Less than 5 ml        Complications: None            8/12/2023     5:52 PM     Sherwin Lazo M.D.

## 2023-08-13 NOTE — PROGRESS NOTES
Pt presents to IR3. ICU RN administered 100 mcg of fentanyl prior to transporting patient. Pt arousable to physical stimuli    Dr. Lazo  consented Afsaneh Lyn's daughter, Julissa Lyn, by phone, confirmed by two RN's. All questions answered.     Intubated, ventilated patient transferred to IR3 table in supine position. RT present throughout procedure.    Patient underwent a inferior vena cava filter placement by Dr. Lazo. Procedure site was marked by MD and verified using imaging guidance. Pt placed on monitor, prepped and draped in a sterile fashion. Vitals were taken every 5 minutes and remained stable during procedure (see doc flow sheet for results). CO2 waveform capnography was monitored and remained WNL throughout procedure.     Report called to Queenie MONTANA. Monitored pt transported by stretcher with RN and RT to T619.     Argon Option Elite Retreivable Vena Cava Filter  REF: [id number]  LOT: [id number]  EXP: 2026-06-14

## 2023-08-13 NOTE — PROGRESS NOTES
[facility] ICU Progress Note      Admit Date: 8/11/2023    Resident(s): Liliam Gates M.D.   Attending:  PEDRITO CAVANAUGH/ Dr. Jackson    Patient ID:    Name:  Afsaneh Lyn   YOB: 1940  Age:  83 y.o.  female   MRN:  [id number]    Hospital Course (carried forward and updated):  Afsaneh Lyn is a 83 y.o. female with  a past medical history of who presented as direct admit from [facility] for acute resp failure, intubated. Pt presented today at OSH for worsening short of breath. Hypoxic in 60-70%, hypotensive. Received 1L fluid bolus. Lactate 21 mg/dl and increased to 87 mg/dl. Unasyn given.      Reportedly, pt went to ED OSH 2 days prior and was diagnosed with pneumonia and sent home with antibiotics. Pt came back with worsening SOB. At ED OSH, pt was profoundly hypoxic, 60-70%, CXR reportedly had near complete opacification of lungs bilaterally.      Upon ICU arrival, pt was intubated, FIO2 100%/ PEEP 8. On ketamine gtt. SBP in 100/60s. Quick bedside POCUS with mod-severely depressed LVEF, dilated RV. TAPSE not able to be well visualized. IVC is full >2 cm.   Pt later developed profound hypotension in 50-60s/30s, 1L bolus LR, 2 amps of bicarb, total 1000 mcg phenylephrine stick was given. Norepinephrine gtt was started. Epinephrine gtt was started. Left tibial IO was established right away due to lack of access. Right IJ central line was emergently placed. ABG 7.33/33/161     Consultants:  Critical Care  IR   Nephrology  Cardiology    Interval Events:  8/13: worsening ELIAS 2/2 ATN 2/2 shock. Uptrending trops --> cardio consult BAL: NGTD. Pending autoimmune studies. MRI brain to r/o stroke in setting of hypoxia for 2h.  8/12:  BUTCH: AAA, Thrombus in RV, severe AR and TR. Pressors off; sedated. Hepatitis panel: negative.  8/11: admitted from [facility] for [facility]HRF requiring intubation and pressors    NEURO:   Ill-appearing; sedated  CARDIOVASC:  SR-ST ; Trop  1584  RESPIRATORY:  VD #3 APV 22/370/+8 no secretions  GI/NUTRITION:  AST/ALT: 124/191,   RENAL/FLUID/LYTES: BUN/Cr: 68/3.28. UA: granular casts, likely 2/2 ATN 2/2 shock  HEME/ONC:   WBC: 19.1, Hgb: 10.1, plt: 82  INFECTIOUS D:  Azithromycin, Unasyn. Procal: 6.35 --> 43.4, CRP: 25.57  ENDOCRINE:   Glu: 182    Vitals Range last 24h:  Temp:  [36.9 °C (98.4 °F)-37.4 °C (99.3 °F)] 37.3 °C (99.1 °F)  Pulse:  [] 102  Resp:  [19-51] 51  BP: (102-144)/(61-82) 120/75  SpO2:  [83 %-100 %] 92 %      Intake/Output Summary (Last 24 hours) at 8/13/2023 1258  Last data filed at 8/13/2023 1000  Gross per 24 hour   Intake 666.64 ml   Output 114 ml   Net 552.64 ml        Review of Systems   Unable to perform ROS: Intubated       PHYSICAL EXAM:  Vitals:    08/13/23 0800 08/13/23 0900 08/13/23 1000 08/13/23 1100   BP: 127/82 122/73 122/71 120/75   Pulse: 99 99 100 (!) 102   Resp: (!) 42 (!) 41 (!) 45 (!) 51   Temp:       TempSrc: Bladder  Bladder    SpO2: 95% 95% 95% 92%   Weight:  58.6 kg (129 lb 3 oz)     Height:        Body mass index is 20.23 kg/m².    O2 therapy: Pulse Oximetry: 92 %, O2 (LPM):  (60%), O2 Delivery Device: Ventilator    Date 08/13/23 0700 - 08/14/23 0659   Shift 8660-4494 3400-4063 0299-2738 24 Hour Total   INTAKE   P.O. 0   0     P.O. 0   0   NG/   100     Intake (mL) (Enteral Tube 08/11/23 Orogastric 16 Fr. Oral) 100   100   Enteral 30   30     Free Water / Tube Flush 30   30   Shift Total 130   130   OUTPUT   Urine 20   20     Output (mL) (Urethral Catheter Temperature probe 16 Fr.) 20   20   Shift Total 20   20      110        Physical Exam  Vitals and nursing note reviewed.   Constitutional:       Appearance: She is normal weight. She is ill-appearing and toxic-appearing.   HENT:      Head: Normocephalic and atraumatic.      Nose: Nose normal.      Comments: NGT     Mouth/Throat:      Mouth: Mucous membranes are dry.      Comments: ETT  Cardiovascular:      Rate and Rhythm: Normal  rate and regular rhythm.      Pulses: Normal pulses.   Pulmonary:      Effort: No respiratory distress.   Chest:      Chest wall: No tenderness.   Abdominal:      General: Bowel sounds are normal. There is no distension.      Palpations: Abdomen is soft.      Tenderness: There is no abdominal tenderness. There is no guarding.   Musculoskeletal:         General: No deformity.      Cervical back: Neck supple.      Right lower leg: No edema.      Left lower leg: No edema.      Comments: SCDs   Skin:     General: Skin is dry.      Capillary Refill: Capillary refill takes 2 to 3 seconds.   Neurological:      Cranial Nerves: No cranial nerve deficit.      Sensory: No sensory deficit.       Recent Labs     08/11/23  1446 08/12/23  0300 08/13/23  0248   ISTATAPH 7.328* 7.430 7.417   ISTATAPCO2 33.0 34.7 30.0   ISTATAPO2 161* 326* 62*   ISTATATCO2 18* 24 20   PMFHHNL1OZN 99 100* 92*   ISTATARTHCO3 17.3 23.0 19.3   ISTATARTBE -8* -1 -4   ISTATTEMP 37.2 C 37.7 C 37.2 C   ISTATFIO2 100 100 40   ISTATSPEC Arterial Arterial Arterial   ISTATAPHTC 7.325* 7.420 7.414   OMCQQMEY4ZG 162* 329* 63*     Recent Labs     08/11/23  1458 08/12/23  0113 08/12/23  1035 08/13/23  0334   SODIUM 143 137 138 137   POTASSIUM 3.7 3.7 3.9 3.8   CHLORIDE 100 100 101 99   CO2 23 20 20 19*   BUN 20 34* 46* 68*   CREATININE 1.10 1.55* 2.15* 3.28*   MAGNESIUM 2.1 2.9*  --  3.1*   PHOSPHORUS  --  4.5  --  5.3*   CALCIUM 8.9 8.5 8.8 8.8     Recent Labs     08/11/23  1458 08/12/23  0113 08/12/23  1035 08/13/23  0334   ALTSGPT 44  --  225* 191*   ASTSGOT 54*  --  289* 124*   ALKPHOSPHAT 145*  --  161* 161*   TBILIRUBIN 0.6  --  0.7 0.4   GLUCOSE 214* 227* 186* 182*     Recent Labs     08/11/23  1458 08/11/23  2225 08/12/23  0409 08/12/23  1056 08/13/23  0334   RBC 3.31*  --  3.43*  --  3.17*   HEMOGLOBIN 10.4*  --  10.9*  --  10.1*   HEMATOCRIT 31.6*  --  32.0*  --  29.8*   PLATELETCT 384  --  165  --  82*   PROTHROMBTM  --  19.1*  --  19.1*  --    APTT  --   34.5  --   --   --    INR  --  1.65*  --  1.65*  --      Recent Labs     08/11/23  1458 08/12/23  0409 08/12/23  1035 08/13/23  0334   WBC 28.2* 16.1*  --  19.1*   NEUTSPOLYS  --  89.80*  --  93.40*   LYMPHOCYTES  --  4.60*  --  2.40*   MONOCYTES  --  4.70  --  3.20   EOSINOPHILS  --  0.00  --  0.00   BASOPHILS  --  0.10  --  0.10   ASTSGOT 54*  --  289* 124*   ALTSGPT 44  --  225* 191*   ALKPHOSPHAT 145*  --  161* 161*   TBILIRUBIN 0.6  --  0.7 0.4       Meds:   ampicillin-sulbactam (UNASYN) IV  3 g      famotidine  20 mg      Or    famotidine  20 mg      insulin regular  2-9 Units      And    dextrose bolus  25 g      Pharmacy  1 Each      labetalol  10 mg      ondansetron  4 mg      Respiratory Therapy Consult        senna-docusate  2 Tablet      And    polyethylene glycol/lytes  1 Packet      And    magnesium hydroxide  30 mL      And    bisacodyl  10 mg      lidocaine  2 mL      fentaNYL  100 mcg      And    fentaNYL  200 mcg      And    fentaNYL   Stopped (08/12/23 0520)    And    dexmedetomidine (Precedex) infusion  0.1-1.5 mcg/kg/hr Stopped (08/12/23 6512)    ipratropium-albuterol  3 mL      acetaminophen  650 mg      ondansetron  4 mg          Procedures:  8/12: IVC filter, Bronchoscopy  8/11: BUTCH, art line, bronchoscopy, reintubation, IO, R IJ    Imaging:  DX-CHEST-PORTABLE (1 VIEW)   Final Result      1.  Satisfactory support lines and tubes.   2.  Bilateral interstitial opacities could be related to pulmonary edema. There is developing airspace opacity in the right lung base, edema versus atelectasis and/or developing infection..         IR-INSERT IVC FILTER WITH IG & SI   Final Result      1. Ultrasound and fluoroscopic guided placement of an Argon Option retrievable IVC filter.      2. Inferior vena cavagram within normal limits with no evidence of caval thrombus or occlusion.      US-EXTREMITY VENOUS LOWER BILAT   Final Result      CT-CTA CHEST PULMONARY ARTERY W/ RECONS   Final Result      1.  Single  small pulmonary embolus in a segmental pulmonary artery to the right upper lobe.      2.  Extensive groundglass opacification throughout the lung fields consistent with pneumonitis or pulmonary edema.      3.  Small bilateral pleural effusions.      4.  Significant right heart strain.            EC-BUTCH W/O CONT   Final Result      EC-ECHOCARDIOGRAM COMPLETE W/ CONT   Final Result      OUTSIDE IMAGES-DX CHEST   Final Result      DX-CHEST-PORTABLE (1 VIEW)   Final Result      1.  Satisfactory support lines and tubes.   2.  No significant interval change.      DX-CHEST-PORTABLE (1 VIEW)   Final Result      1.  Mild interstitial pulmonary edema.   2.  Interval placement of an endotracheal tube which terminates in satisfactory position at the level of the aortic arch.   3.  Interval insertion of a central venous catheter which terminates with the tip projecting over the expected region of the mid to distal superior vena cava.   4.  Interval placement of an enteric feeding tube which courses towards the stomach with the distal tip not visualized.      DX-ABDOMEN FOR TUBE PLACEMENT   Final Result      Enteric feeding tube tip terminates in the left abdomen over the expected location of the stomach.      MR-BRAIN-W/O    (Results Pending)       ASSESSEMENT and PLAN:    * Acute respiratory failure with hypoxia (HCC)  Assessment & Plan  Likely 2/2 diffuse pneumonitis vs PE vs HF vs infectious process  -8/11 Intubated by EMS flight crew at OSH. Reportedly pt was in 60% oxygen sat, breathing 40s/min. For 2 hours  -Pt was intubated with ET tube #6. No issues en route.   -Reportedly CXR showed near complete opacification in bilateral lungs. CD from OSH was being uploaded to [employer]  -At [facility], 8/11 S/p ET tube exchange from #6 to #7.5  -8/11 CT chest with extensive diffuse pneumonitis.   -8/11 Repeat bronch with evidence of bronchoalveolar hemorrhage.   -8/11 started on pulse steroids 1000mg IV daily x 3 days  -Overall FIO2/PEEP  are improving with good lung compliance.   -Sedation with precedex and fentanyl. Daily SAT/SBT if able  -Goal sat >90%, pplat <30  -Autoimmune work up: MARISEL, APL labs, ANCA, Anti GBM  -Infectious w/u pending. COVID/flu negative. Cocci pending. HIV, -hepatitis/fungitell  -BAL cultures: pending  -MRI brain pending to r/o stroke in setting of hypoxia >2h  -Nephrology was consulted: if anuric + Scr increasing, HD tomorrow    Elevated troponin  Assessment & Plan  -Likely due to demand ischemia  -Troponins uptrending; now 1874  -Continue serial troponins.   -ECG with TWI in septal leads. Repeat ECG with no change  -Pt won't be candidate for heparin gtt or aspirin; IVC filter placed  -Cardiology consulted    Diffuse pulmonary alveolar hemorrhage  Assessment & Plan  -DAH noted on bronch 8/12  -CTA chest with small PE in RUL but extensive pneumonitis  -Autoimmune work up pending (MARISEL, ANCA, GBM, APL)  -Solumedrol 1000mg IV daily x 3 days, then wean   -Creatinine now rising (normal at admission), this is confounded by her shock...?ATN  -Initial UA with trace occult blood, hyaline cast. Repeat UA with large RBC  -May need kidney biopsy  -Nephrology was consulted: see above for recs    Acute pulmonary embolism (HCC)  Assessment & Plan  -Small segmental PE in RUL noted on CTA.   -US LE + extensive bilateral DVT  -Anticoagulation contraindicated due to DAH  -8/12 s/p IVC filter + SCDs    RV (right ventricular) mural thrombus  Assessment & Plan  -RV thrombus noted in TTE and BUTCH; No vegetation valve.   -APL diagnosis was entertained. Pending APL labs  -No known hx of APL in the family.   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    -8/12 s/p  IVC filter    Acute kidney injury (HCC)  Assessment & Plan  -Likely in the setting of multifactorial: ATN 2/2 profound shock vs preexisting pulm renal syndrome vs drug induced  -Monitor UOP  -Monitor electrolytes, acid-base disturbances.   -Nephrology consulted: HD tomorrow    Transaminitis  Assessment & Plan  -Likely 2/2 shock  -Total bilirubin normal  -Continue to monitor    Lactic acidosis  Assessment & Plan  -Initial lactate 9.6 mmol/L -->2.6  -Fluid resuscitation per sepsis protocol. See sepsis section   -Serial lactates until <2    Shock (HCC)- (present on admission)  Assessment & Plan  2/2 likely cardiogenic +/- septic +/- distributive  -S/p sepsis protocol  -Lactate 21 mg/dl --> 87mg/dl. This is equivalent to 9.6 mmol/L. Repeat lactate here 4.4 mmol/L; continue to monitor  -LVEF 40-45% with evidence of RV pressure load + AR/TR  -On NE and vasopressin gtt on 8/11, weaned off. Keep MAP >65  -Unable to anticoagulate due to DAH --> 8/12 s/p IVC filter  -Unasyn #1/5  -Bcx: NGTD  -Monitor UOP, goal >30cc/hr.       DISPO: NA    CODE STATUS: DNAR/ I OK    Quality Measures:  Feeding: NGT  Analgesia: precedex  Sedation: fentanyl  Thromboprophylaxis: SCDs  Head of bed: >30 degrees  Ulcer prophylaxis: famotidine  Glycemic control: ISS  Bowel care: bowel regimen: yes  Indwelling lines: pIV, R IJ, art line  Deescalation of antibiotics: jules gunter M.D.

## 2023-08-13 NOTE — CARE PLAN
Problem: Safety - Medical Restraint  Goal: Remains free of injury from restraints (Restraint for Interference with Medical Device)  Outcome: Progressing     Problem: Pain - Standard  Goal: Alleviation of pain or a reduction in pain to the patient’s comfort goal  Outcome: Progressing   The patient is Unstable - High likelihood or risk of patient condition declining or worsening    Shift Goals  Clinical Goals: maintain hemodynamics  Patient Goals: CJ  Family Goals: CT scan tonight    Progress made toward(s) clinical / shift goals:  off vaso pressors and sedation, opens her eyes to voice and temp resolving without medication. IR for IVC filter    Patient is not progressing towards the following goals: Pt still does not follow commands, ELIAS, elevated LFTs, increased trop with new cards consult and bilateral DVTs

## 2023-08-13 NOTE — ASSESSMENT & PLAN NOTE
Multifactorial given pt's shock, ELIAS, multiorgan failure  Rule out stroke given her high clot burden  MRI brain w/o contrast pending  EEG spot today

## 2023-08-13 NOTE — PROGRESS NOTES
[facility] -  PROGRESS NOTE               Author: Liborio Ronquillo M.D. Date & Time: 8/13/2023  1:13 PM     HPI:  Afsaneh Lyn is a 83 y.o. female who presented as direct admit from [facility] for acute resp failure, intubated. Pt presented today at OSH for worsening short of breath. Hypoxic in 60-70%, hypotensive. Received 1L fluid bolus. Lactate 21 mg/dl and increased to 87 mg/dl. Unasyn given.      Reportedly, pt went to ED OSH 2 days prior and was diagnosed with pneumonia and sent home with antibiotics. Pt came back with worsening SOB. At ED OSH, pt was profoundly hypoxic, 60-70%, CXR reportedly had near complete opacification of lungs bilaterally.      Upon ICU arrival, pt was intubated, FIO2 100%/ PEEP 8. On ketamine gtt. SBP in 100/60s. Quick bedside POCUS with mod-severely depressed LVEF, dilated RV. TAPSE not able to be well visualized. IVC is full >2 cm.   Pt later developed profound hypotension in 50-60s/30s, 1L bolus LR, 2 amps of bicarb, total 1000 mcg phenylephrine stick was given. Norepinephrine gtt was started. Epinephrine gtt was started. Left tibial IO was established right away due to lack of access. Right IJ central line was emergently placed. ABG 7.33/33/161     Scr on admission was 1.1 and yael to 1.55 and now 2.15 prompting Nephrology consultation. BUTCH results as below. Patient is currently on vasopressor support.   She also received CT Angio with results below.     BUTHC:  Ascending aortic aneurysm without clear evidence of dissection.   Multiple echogenic densities seen in right ventricle more consistent with  thrombus then vegetation, but cannot rule out vegetation.  Moderate to severe aortic and tricuspid regurgitation.     CT Angio:  1.  Single small pulmonary embolus in a segmental pulmonary artery to the right upper lobe.     2.  Extensive groundglass opacification throughout the lung fields consistent with pneumonitis or pulmonary  "edema.     3.  Small bilateral pleural effusions.     4.  Significant right heart strain.    DAILY NEPHROLOGY SUMMARY:  8/13: ivc filter yesterday. Scr is higher 3.28. Electrolytes are ok. Still anuric. Off vasopressors since this am.     REVIEW OF SYSTEMS:    Unable to assess given patient's clinical status     PMH/PSH/SH/FH:   Reviewed and unchanged since admission note    CURRENT MEDICATIONS:   Reviewed from admission to present day    VS:  /80   Pulse (!) 101   Temp 37.3 °C (99.1 °F) (Bladder)   Resp (!) 38   Ht 1.702 m (5' 7\")   Wt 58.6 kg (129 lb 3 oz)   SpO2 90%   Breastfeeding No   BMI 20.23 kg/m²     Physical Exam  Vitals and nursing note reviewed.     Constitutional:       General: She is not in acute distress.     Appearance: She is ill-appearing and toxic-appearing. She is not diaphoretic.   HENT:      Head: Normocephalic.      Mouth/Throat:      Mouth: Mucous membranes are dry.      Comments: ET tube in place  Cardiovascular:      Rate and Rhythm: Normal rate and regular rhythm.      Pulses: Normal pulses.      Heart sounds: Normal heart sounds. No murmur heard.  Pulmonary:      Effort: No respiratory distress.      Breath sounds: Normal breath sounds. No wheezing, rhonchi or rales.   Abdominal:      General: There is no distension.      Palpations: Abdomen is soft.      Tenderness: There is no abdominal tenderness. There is no guarding.   Musculoskeletal:      Cervical back: Neck supple.      Right lower leg: No edema.      Left lower leg: No edema.   Skin:     Capillary Refill: Capillary refill takes less than 2 seconds.      Coloration: Skin is not jaundiced.      Findings: No bruising or rash.   Neurological:      General: No focal deficit present.     Fluids:  In: 1666.6 [I.V.:1079.9; Other:30; Enteral:90]  Out: 136     LABS:  Recent Labs     08/12/23  0113 08/12/23  1035 08/13/23  0334   SODIUM 137 138 137   POTASSIUM 3.7 3.9 3.8   CHLORIDE 100 101 99   CO2 20 20 19*   GLUCOSE 227* " 186* 182*   BUN 34* 46* 68*   CREATININE 1.55* 2.15* 3.28*   CALCIUM 8.5 8.8 8.8       IMAGING:   All imaging reviewed from admission to present day    IMPRESSION:  # ELIAS with Anuria- multifactorial with Hypoperfusion/Septic Shock, and JUAN  # Acidosis  # Hypotension - off vasopressor support.  # Septic Shock  # Diffuse Pneumonitis  # Single Segmental Pulm Embolism  # Pulmonary Alveolar Hemorrhage  # Intracardiac thrombus - serologies sent  # Acute Respiratory Failure with Hypoxia on Vent support.      PLAN:  - No compelling indication for RRT today but given she is still anuric will need it in the next 24hrs. If still anuric and Scr higher tomorrow please place temp dialysis catheter in the am and we will initiate HD.   - Daily evaluation for RRT needs  - Dose all meds per eGFR < 15  - Keep MAP >/= to 65  - Monitor IO           Total CCM > 35 mins

## 2023-08-13 NOTE — CARE PLAN
Problem: Ventilation  Goal: Ability to achieve and maintain unassisted ventilation or tolerate decreased levels of ventilator support  Description: Target End Date:  4 days      Document on Vent flowsheet     1.  Support and monitor invasive and noninvasive mechanical ventilation  2.  Monitor ventilator weaning response  3.  Perform ventilator associated pneumonia prevention interventions  4.  Manage ventilation therapy by monitoring diagnostic test results  Outcome: Not Met                           Ventilator Daily Summary     Vent Day #3     Ventilator settings:  APV 22/370/+8     Weaning trials:N/A     Respiratory Procedures: N/A     Plan: Continue current ventilator settings and wean mechanical ventilation as tolerated per physician orders

## 2023-08-13 NOTE — CARE PLAN
The patient is Watcher - Medium risk of patient condition declining or worsening    Shift Goals  Clinical Goals: improve neuro status/ MRI  Patient Goals: CJ  Family Goals: get MRI    Progress made toward(s) clinical / shift goals:    Problem: Safety - Medical Restraint  Goal: Remains free of injury from restraints (Restraint for Interference with Medical Device)  Description: INTERVENTIONS:  1. Determine that other, less restrictive measures have been tried or would not be effective before applying the restraint  2. Evaluate the patient's condition at the time of restraint application  3. Educate patient/family regarding the reason for restraint  4. Q2H: Monitor safety, psychosocial status, comfort, circulation, respiratory status, LOC, nutrition and hydration  Outcome: Progressing  Flowsheets (Taken 8/13/2023 1156)  Addressed this shift: Remains free of injury from restraints (restraint for interference with medical device):   Every 2 hours: Monitor safety, psychosocial status, comfort, nutrition and hydration   Inform patient/family regarding the reason for restraint   Evaluate the patient's condition at the time of restraint application   Determine that other, less restrictive measures have been tried or would not be effective before applying the restraint  Goal: Free from restraint(s) (Restraint for Interference with Medical Device)  Description: INTERVENTIONS:  1.  ONCE/SHIFT or MINIMUM Q12H: Assess and document the continuing need for restraints  2.  Q24H: Continued use of restraint requires LIP to perform face to face examination and written order  3.  Identify and implement measures to help patient regain control  4.  Educate patient/family on discontinuation criteria   5.  Assess patient's understanding and retention of education provided  6.  Assess readiness for release & initiate progressive release per protocol  7.  Identify and document criteria for restraints  Outcome: Progressing  Flowsheets (Taken  8/13/2023 1159)  Addressed this shift: Free from restraint(s) (restraint for interference with medical device):   Every 24 hours: Continued use of restraint requires Licensed Independent Practitioner to perform face to face examination and written order   ONCE/SHIFT or MINIMUM Every 12 hours: Assess and document the continuing need for restraints   Identify and implement measures to help patient regain control     Problem: Pain - Standard  Goal: Alleviation of pain or a reduction in pain to the patient’s comfort goal  Description: Target End Date:  Prior to discharge or change in level of care    Document on Vitals flowsheet    1.  Document pain using the appropriate pain scale per order or unit policy  2.  Educate and implement non-pharmacologic comfort measures (i.e. relaxation, distraction, massage, cold/heat therapy, etc.)  3.  Pain management medications as ordered  4.  Reassess pain after pain med administration per policy  5.  If opiods administered assess patient's response to pain medication is appropriate per POSS sedation scale  6.  Follow pain management plan developed in collaboration with patient and interdisciplinary team (including palliative care or pain specialists if applicable)  Outcome: Progressing  Flowsheets (Taken 8/13/2023 1000)  Critical-Care Pain Observation Score: 0       Patient is not progressing towards the following goals:

## 2023-08-13 NOTE — CARE PLAN
The patient is Watcher - Medium risk of patient condition declining or worsening    Shift Goals  Clinical Goals: maintain hemodynamic stability, improve neuro status  Patient Goals: CJ  Family Goals: Results    Progress made toward(s) clinical / shift goals:    Problem: Skin Integrity  Goal: Skin integrity is maintained or improved  Outcome: Progressing  Note: Appropriate wound prevention protocols in place, offloading and moisturizing to lips in place.      Problem: Safety - Medical Restraint  Goal: Remains free of injury from restraints (Restraint for Interference with Medical Device)  Outcome: Progressing  Note: Restraint checks and charting in place     Problem: Pain - Standard  Goal: Alleviation of pain or a reduction in pain to the patient’s comfort goal  Outcome: Progressing  Note: Pt's pain controlled with interventions       Patient is not progressing towards the following goals:

## 2023-08-13 NOTE — PROGRESS NOTES
Critical Care Progress Note    Date of admission  8/11/2023    Chief Complaint  Afsaneh Lyn is a 83 y.o. female who presented as direct admit from [facility] for acute resp failure, intubated. Pt presented 8/11 at OSH for worsening short of breath. Hypoxic in 60-70%, hypotensive. Received 1L fluid bolus. Lactate 21 mg/dl and increased to 87 mg/dl. Unasyn given. Pt was intubated by flight crew before transfer to [facility]    Upon ICU arrival, pt was on the vent FIO2 100%/ PEEP 8. On ketamine gtt. SBP in 100/60s. Quick bedside POCUS with mod-severely depressed LVEF, dilated RV. RV free wall not able to be well visualized. IVC is full >2 cm. Pt later developed profound hypotension in 50-60s/30s, 1L bolus LR, 2 amps of bicarb, total 1000 mcg phenylephrine stick was given. Norepinephrine gtt was started. Epinephrine gtt was started. Left tibial IO was established right away due to lack of access. Right IJ central line was emergently placed. ABG 7.33/33/161     Reportedly, pt went to ED OSH 2 days prior and was diagnosed with pneumonia and sent home with antibiotics. Pt came back with worsening SOB. At ED OSH, pt was profoundly hypoxic, 60-70%, CXR reportedly had near complete opacification of lungs bilaterally.      Hospital Course  8/11 direct admit from [facility]  8/11 s/p bronch with evidence of DAH  8/11 started pulse steroid 1000mg IV daily x 3 days  8/11 CTA chest with small PE in RUL. BUTCH with RV thrombus, RV pressure overload. No vegetations  8/12 US LE + DVT  8/12 s/p IVC filter    Interval Problem Update  Reviewed last 24 hour events:  Remains critically ill   Off sedation, pt open eyes, still not following commands.  Daily SAT  Remains on full vent support, 40%/PEEP 8  ABG acceptable.   SAT off precedex. RASS -2  NE gtt off. Vasopressin off  Creatinine 3.28, K 3.8  HCO3 19  UOP 20-30cc every 4 hours.     Review of Systems  Review of Systems   Constitutional:   Negative for fever and malaise/fatigue.   Respiratory:  Negative for cough and shortness of breath.    Cardiovascular:  Negative for chest pain and leg swelling.   Gastrointestinal:  Negative for abdominal pain, diarrhea, nausea and vomiting.   Musculoskeletal:  Negative for back pain.   Neurological:  Negative for headaches.   Psychiatric/Behavioral:  The patient is not nervous/anxious.    All other systems reviewed and are negative.       Vital Signs for last 24 hours   Temp:  [36.9 °C (98.4 °F)-37.6 °C (99.7 °F)] 37.3 °C (99.1 °F)  Pulse:  [] 100  Resp:  [19-39] 30  BP: ()/(57-80) 118/72  SpO2:  [83 %-100 %] 93 %    Hemodynamic parameters for last 24 hours       Respiratory Information for the last 24 hours  Vent Mode: APVCMV  Rate (breaths/min): 22  Vt Target (mL): 370  PEEP/CPAP: 8  MAP: 12  Control VTE (exp VT): 454    Physical Exam   Physical Exam  Vitals and nursing note reviewed.   Constitutional:       General: She is not in acute distress.     Appearance: She is ill-appearing and toxic-appearing. She is not diaphoretic.      Comments: Open eyes   HENT:      Head: Normocephalic.      Mouth/Throat:      Mouth: Mucous membranes are dry.      Comments: ET tube in place  Cardiovascular:      Rate and Rhythm: Normal rate and regular rhythm.      Pulses: Normal pulses.      Heart sounds: Normal heart sounds. No murmur heard.  Pulmonary:      Effort: No respiratory distress.      Breath sounds: Normal breath sounds. No wheezing, rhonchi or rales.   Abdominal:      General: There is no distension.      Palpations: Abdomen is soft.      Tenderness: There is no abdominal tenderness. There is no guarding.   Musculoskeletal:      Cervical back: Neck supple.      Right lower leg: No edema.      Left lower leg: No edema.   Skin:     Capillary Refill: Capillary refill takes less than 2 seconds.      Coloration: Skin is not jaundiced.      Findings: No bruising or rash.   Neurological:      General: No focal  deficit present.         Medications  Current Facility-Administered Medications   Medication Dose Route Frequency Provider Last Rate Last Admin    famotidine (Pepcid) tablet 20 mg  20 mg Enteral Tube DAILY CHRISTY PriceOFlavio   20 mg at 08/13/23 0507    Or    famotidine (Pepcid) injection 20 mg  20 mg Intravenous DAILY Mustapha Jackson D.O.        azithromycin (Zithromax) tablet 500 mg  500 mg Enteral Tube DAILY AT NOON CHRISTY PriceOFlavio   500 mg at 08/12/23 1259    insulin regular (HumuLIN R,NovoLIN R) injection  2-9 Units Subcutaneous Q6HRS FOUZIA Price.O.   2 Units at 08/13/23 0511    And    dextrose 50% (D50W) injection 25 g  25 g Intravenous Q15 MIN PRN Mustapha Jackson D.O.        ampicillin/sulbactam (Unasyn) 3 g in  mL IVPB  3 g Intravenous Q12HRS Mustapha Jackson D.O.   Stopped at 08/13/23 0548    Pharmacy Consult: Enteral tube insertion - review meds/change route/product selection  1 Each Other PHARMACY TO DOSE Mustapha Jackson D.O.        labetalol (Normodyne/Trandate) injection 10 mg  10 mg Intravenous Q4HRS PRN Liliam Gates M.D.        ondansetron (Zofran) syringe/vial injection 4 mg  4 mg Intravenous Q4HRS PRN Liliam Gates M.D.        Respiratory Therapy Consult   Nebulization Continuous RT Liliam Gates M.D.        senna-docusate (Pericolace Or Senokot S) 8.6-50 MG per tablet 2 Tablet  2 Tablet Enteral Tube BID Liliam Gates M.D.   2 Tablet at 08/13/23 0507    And    polyethylene glycol/lytes (Miralax) PACKET 1 Packet  1 Packet Enteral Tube QDAY PRN Liliam Gates M.D.   1 Packet at 08/13/23 0506    And    magnesium hydroxide (Milk Of Magnesia) suspension 30 mL  30 mL Enteral Tube QDAY PRN Liliam Gates M.D.        And    bisacodyl (Dulcolax) suppository 10 mg  10 mg Rectal QDAY PRN Liliam Gates M.D.        MD Alert...ICU Electrolyte Replacement per Pharmacy   Other PHARMACY TO DOSE Liliam Gates M.D.         lidocaine (Xylocaine) 1 % injection 2 mL  2 mL Tracheal Tube Q30 MIN PRN Liliam Gates M.D.        fentaNYL (Sublimaze) injection 100 mcg  100 mcg Intravenous Q15 MIN PRN Liliam Gates M.D.   100 mcg at 08/13/23 0427    And    fentaNYL (Sublimaze) injection 200 mcg  200 mcg Intravenous Q15 MIN PRN Liliam Gates M.D.        And    fentaNYL (SUBLIMAZE) 50 mcg/mL in 50mL (Continuous Infusion)   Intravenous Continuous Liliam Gates M.D.   Stopped at 08/12/23 0520    And    dexmedetomidine (PRECEDEX) 400 mcg/100mL NS premix infusion  0.1-1.5 mcg/kg/hr Intravenous Continuous Liliam Gates M.D.   Stopped at 08/12/23 0428    ipratropium-albuterol (DUONEB) nebulizer solution  3 mL Nebulization Q4H PRN (RT) Liliam Gates M.D.        acetaminophen (Tylenol) tablet 650 mg  650 mg Enteral Tube Q6HRS PRN Liliam Gates M.D.        ondansetron (Zofran ODT) dispertab 4 mg  4 mg Enteral Tube Q4HRS PRN Liliam Gates M.D.        norepinephrine (Levophed) infusion 32 mg/500 mL (premix)  0-1 mcg/kg/min Intravenous Continuous Walker Loo Jr. D.OFlavio   Stopped at 08/12/23 0836       Fluids    Intake/Output Summary (Last 24 hours) at 8/13/2023 0627  Last data filed at 8/13/2023 0600  Gross per 24 hour   Intake 1666.56 ml   Output 136 ml   Net 1530.56 ml         Laboratory  Recent Labs     08/11/23  1446 08/12/23  0300 08/13/23  0248   ISTATAPH 7.328* 7.430 7.417   ISTATAPCO2 33.0 34.7 30.0   ISTATAPO2 161* 326* 62*   ISTATATCO2 18* 24 20   JMELANL5TWL 99 100* 92*   ISTATARTHCO3 17.3 23.0 19.3   ISTATARTBE -8* -1 -4   ISTATTEMP 37.2 C 37.7 C 37.2 C   ISTATFIO2 100 100 40   ISTATSPEC Arterial Arterial Arterial   ISTATAPHTC 7.325* 7.420 7.414   DWWMDIUQ1EH 162* 329* 63*           Recent Labs     08/11/23  1458 08/12/23  0113 08/12/23  1035 08/13/23  0334   SODIUM 143 137 138 137   POTASSIUM 3.7 3.7 3.9 3.8   CHLORIDE 100 100 101 99   CO2  23 20 20 19*   BUN 20 34* 46* 68*   CREATININE 1.10 1.55* 2.15* 3.28*   MAGNESIUM 2.1 2.9*  --  3.1*   PHOSPHORUS  --  4.5  --  5.3*   CALCIUM 8.9 8.5 8.8 8.8       Recent Labs     08/11/23  1458 08/12/23  0113 08/12/23  1035 08/13/23  0334   ALTSGPT 44  --  225* 191*   ASTSGOT 54*  --  289* 124*   ALKPHOSPHAT 145*  --  161* 161*   TBILIRUBIN 0.6  --  0.7 0.4   GLUCOSE 214* 227* 186* 182*       Recent Labs     08/11/23 1458 08/12/23  0409 08/12/23  1035 08/13/23  0334   WBC 28.2* 16.1*  --  19.1*   NEUTSPOLYS  --  89.80*  --  93.40*   LYMPHOCYTES  --  4.60*  --  2.40*   MONOCYTES  --  4.70  --  3.20   EOSINOPHILS  --  0.00  --  0.00   BASOPHILS  --  0.10  --  0.10   ASTSGOT 54*  --  289* 124*   ALTSGPT 44  --  225* 191*   ALKPHOSPHAT 145*  --  161* 161*   TBILIRUBIN 0.6  --  0.7 0.4       Recent Labs     08/11/23 1458 08/11/23  2225 08/12/23  0409 08/12/23  1056 08/13/23  0334   RBC 3.31*  --  3.43*  --  3.17*   HEMOGLOBIN 10.4*  --  10.9*  --  10.1*   HEMATOCRIT 31.6*  --  32.0*  --  29.8*   PLATELETCT 384  --  165  --  82*   PROTHROMBTM  --  19.1*  --  19.1*  --    APTT  --  34.5  --   --   --    INR  --  1.65*  --  1.65*  --          Imaging  X-Ray:  I have personally reviewed the images and compared with prior images.  CT:    Reviewed    Assessment/Plan  * Acute respiratory failure with hypoxia (HCC)  Assessment & Plan  8/11 Intubated by EMS flight crew at OSH. Reportedly pt was in 60% oxygen sat, breathing 40s/min.   Pt was intubated with ET tube #6. No issues en route.   Reportedly CXR showed near complete opacification in bilateral lungs. CD from OSH was being uploaded to [facility]    At [facility], 8/11 S/p ET tube exchange from #6 to #7.5  8/11 CT chest with extensive diffuse pneumonitis.   8/11 Repeat bronch with evidence of bronchoalveolar hemorrhage.   8/11 started on pulse steroids 1000mg IV daily x 3 days    Likely the main  is diffuse pneumonitis with concomitant PE and moderately depressed LVEF/RV  dysfunction +/- infectious (aspiration vs others). Autoimmune and infectious disease work up is ongoing.    Overall FIO2/PEEP are improving with good lung compliance.     Plan:   Sedation with precedex and fentanyl. Daily SAT/SBT if able  Goal sat >90%, pplat <30  Autoimmune work up: MARISEL, APL labs, ANCA, Anti GBM  Infectious w/u pending. COVID/flu negative. Cocci pending. HIV, hepatitis/fungitell  Follow up BAL cultures.   Nephrology was consulted for concern of rising creatinine and anuric. May need dialysis    Diffuse pulmonary alveolar hemorrhage  Assessment & Plan  DAH noted on bronch.   CTA chest with small PE in RUL but extensive pneumonitis  Autoimmune work up pending (MARISEL, ANCA, GBM, APL). Follow up  Solumedrol 1000mg IV daily x 3 days, then wean   Creatinine now rising (normal at admission), this is confounded by her shock. Could be ATN  Initial UA with trace occult blood, hyaline cast. Repeat UA with large RBC  May need kidney biopsy  Nephrology was consulted.       Acute pulmonary embolism (HCC)  Assessment & Plan  Small segmental PE in RUL noted on CTA.   US LE + extensive bilateral DVT  Anticoagulation contraindicated due to DAH  8/12 s/p IVC filter.     Shock (HCC)- (present on admission)  Assessment & Plan    SIRS criteria identified on my evaluation include: Tachycardia, with heart rate greater than 90 BPM, Tachypnea, with respirations greater than 20 per minute and Leukocytosis, with WBC greater than 12,000  Clinical indicators of end organ dysfunction include Hypotension with systolic blood pressure less than 90 or MAP less than 65  Indicators of septic shock include: Sepsis present and persistent hypotension as well as initial lactate level result is greater than or equal to 4mmol/L   Sources is: lungs vs others  Sepsis protocol initiated  Crystalloid Fluid Administration: Fluid resuscitation ordered per standard protocol - 30 mL/kg per current or ideal body weight  IV antibiotics as appropriate  for source of sepsis  Reassessment: I have reassessed the patient's hemodynamic status  Lactate 21mg/dl --> 87mg/dl. This is equivalent to 9.6 mmol/L. Repeat lactate here 4.4 mmol/L    Shock is likely combination of cardiogenic/septic?/distributive from PE given RV pressure overload though clot burden was fairly low considering the small segmental PE on CTA.   LVEF 40-45% with evidence of RV pressure load.   S/p total 2L fluid resuscitations at admission  On NE and vasopressin gtt on 8/11, weaned off. Keep MAP >65  Unable to anticoagulate due to DAH  8/12 s/p IVC filter  Serial lactates. Lactates improving.  Unasyn   Follow up blood cultures.  Monitor UOP, goal >30cc/hr.       Encephalopathy acute  Assessment & Plan  Multifactorial given pt's shock, ELIAS, multiorgan failure  Rule out stroke given her high clot burden  MRI brain w/o contrast today    DVT (deep venous thrombosis) (HCC)  Assessment & Plan  Pt has hx of prolong drive (3 hours) to her ranch [place] in [facility] multiple times  US LE positive for extensive DVTs bilaterally  Anticoagulation contraindicated due to diffuse alveolar hemorrhage.    8/12 s/p IVC filter    Acute kidney injury (HCC)  Assessment & Plan  Rising creatinine today with anuria.   Etiology  - multifactorial. DDx include ATN from profound shock vs preexisting pulm renal syndrome vs drug induced  Monitor UOP  Monitor electrolytes, acid-base disturbances.   Nephrology Dr. Vargas was consulted.     Lactic acidosis  Assessment & Plan  Initial lactate 9.6 mmol/L  Fluid resuscitation per sepsis protocol. See sepsis section   Serial lactates until <2      Elevated troponin  Assessment & Plan  Trop in 100-200s. Recent troponin in [id number]  ECG with TWI in septal leads with ?mild ST elevation inferiorly. Repeat ECG with no change  Pt won't be candidate for heparin gtt or aspirin  Spoke with Cardiology Dr. Basurto yesterday who felt not STEMI, possibly RV strain   Suspect demand  ischemia  Continue serial troponins  Suspect underlying cardiac disease, likely need left heart cath when pt is more stable.   Formal cardiology consult      RV (right ventricular) mural thrombus  Assessment & Plan  RV thrombus noted in TTE and BUTCH.   No vegetation valve.   APL diagnosis was entertained. Pending APL labs  No known hx of APL in the family.   Anticoagulation contraindicated due to DAH    Transaminitis  Assessment & Plan  Likely related to shock. Monitor.   Total bilirubin normal    Goals of care, counseling/discussion  Assessment & Plan  Updated family daily. Explained pt's critical condition. All questions were answered.          VTE:  Contraindicated  Ulcer: H2 Antagonist  Lines: Central Line  Ongoing indication addressed, Arterial Line  Ongoing indication addressed, and Arizmendi Catheter  Ongoing indication addressed    I have performed a physical exam and reviewed and updated ROS and Plan today (8/13/2023). In review of yesterday's note (8/12/2023), there are no changes except as documented above.     Discussed patient condition and risk of morbidity and/or mortality with RN, RT, Pharmacy, Charge nurse / hot rounds, and Patient    The patient remains critically ill.  Critical care time = 80 minutes in directly providing and coordinating critical care and extensive data review.  No time overlap and excludes procedures.

## 2023-08-14 NOTE — CARE PLAN
Problem: Ventilation  Goal: Ability to achieve and maintain unassisted ventilation or tolerate decreased levels of ventilator support  Description: Target End Date:  4 days      Document on Vent flowsheet     1.  Support and monitor invasive and noninvasive mechanical ventilation  2.  Monitor ventilator weaning response  3.  Perform ventilator associated pneumonia prevention interventions  4.  Manage ventilation therapy by monitoring diagnostic test results  Outcome: Not Met                           Ventilator Daily Summary     Vent Day #4     Ventilator settings:  APV 22/310/8/60     Weaning trials:N/A     Respiratory Procedures: N/A     Plan: Continue current ventilator settings and wean mechanical ventilation as tolerated per physician orders

## 2023-08-14 NOTE — PROGRESS NOTES
Monitor summary    SR-ST  Rate   .11/.07/A Risk of Abuse assessment for opioid abuse was previously preformed and documented in the past medical history. The treatment plan was reviewed and discussed with the patient. The pharmacy monitoring report was requested and reviewed. The treatment plan was adjusted based on efficacy as described.

## 2023-08-14 NOTE — PROGRESS NOTES
Monitor Summary:  Sinus Rhythm to Sinus Tachycardia  with occasional PAC/PVC, pt did have short 3 sec run of PSVT but self converted       no

## 2023-08-14 NOTE — DIETARY
Nutrition Services Brief Update:    Problem: Nutritional:  Goal: Nutrition support tolerated and meeting greater than 85% of estimated needs  Outcome: Met. Per EMR, Pt TF at goal. Novasource Renal, goal rate 25 ml/hr, providing 1200 kcals, 54 grams protein, 430 mL free water.

## 2023-08-14 NOTE — PROGRESS NOTES
[facility] -  PROGRESS NOTE               Author: Praveen Villagomez M.D. Date & Time: 8/14/2023  10:29 AM     HPI:  Afsaneh Lyn is a 83 y.o. female who presented as direct admit from [facility] for acute resp failure, intubated. Pt presented today at OSH for worsening short of breath. Hypoxic in 60-70%, hypotensive. Received 1L fluid bolus. Lactate 21 mg/dl and increased to 87 mg/dl. Unasyn given.      Reportedly, pt went to ED OSH 2 days prior and was diagnosed with pneumonia and sent home with antibiotics. Pt came back with worsening SOB. At ED OSH, pt was profoundly hypoxic, 60-70%, CXR reportedly had near complete opacification of lungs bilaterally.      Upon ICU arrival, pt was intubated, FIO2 100%/ PEEP 8. On ketamine gtt. SBP in 100/60s. Quick bedside POCUS with mod-severely depressed LVEF, dilated RV. TAPSE not able to be well visualized. IVC is full >2 cm.   Pt later developed profound hypotension in 50-60s/30s, 1L bolus LR, 2 amps of bicarb, total 1000 mcg phenylephrine stick was given. Norepinephrine gtt was started. Epinephrine gtt was started. Left tibial IO was established right away due to lack of access. Right IJ central line was emergently placed. ABG 7.33/33/161     Scr on admission was 1.1 and yael to 1.55 and now 2.15 prompting Nephrology consultation. BUTCH results as below. Patient is currently on vasopressor support.   She also received CT Angio with results below.     BUTCH:  Ascending aortic aneurysm without clear evidence of dissection.   Multiple echogenic densities seen in right ventricle more consistent with  thrombus then vegetation, but cannot rule out vegetation.  Moderate to severe aortic and tricuspid regurgitation.     CT Angio:  1.  Single small pulmonary embolus in a segmental pulmonary artery to the right upper lobe.     2.  Extensive groundglass opacification throughout the lung fields consistent with pneumonitis or pulmonary  "edema.     3.  Small bilateral pleural effusions.     4.  Significant right heart strain.    DAILY NEPHROLOGY SUMMARY:  8/13: ivc filter yesterday. Scr is higher 3.28. Electrolytes are ok. Still anuric. Off vasopressors since this am.   8/14: Scr continues to trend up.  Intubated    REVIEW OF SYSTEMS:    Unable to assess given patient's clinical status     PMH/PSH/SH/FH:   Reviewed and unchanged since admission note    CURRENT MEDICATIONS:   Reviewed from admission to present day    VS:  BP (!) 142/90   Pulse 61   Temp 37.7 °C (99.9 °F) (Bladder)   Resp 19   Ht 1.702 m (5' 7\")   Wt 58.9 kg (129 lb 13.6 oz)   SpO2 92%   Breastfeeding No   BMI 20.34 kg/m²     Physical Exam  Vitals and nursing note reviewed.     Constitutional:       General: She is not in acute distress.     Appearance: She is ill-appearing and toxic-appearing. She is not diaphoretic.   HENT:      Head: Normocephalic.      Mouth/Throat:      Mouth: Mucous membranes are dry.      Comments: ET tube in place  Cardiovascular:      Rate and Rhythm: Normal rate and regular rhythm.      Pulses: Normal pulses.      Heart sounds: Normal heart sounds. No murmur heard.  Pulmonary:      Effort: No respiratory distress.      Breath sounds: Normal breath sounds. No wheezing, rhonchi or rales.   Abdominal:      General: There is no distension.      Palpations: Abdomen is soft.      Tenderness: There is no abdominal tenderness. There is no guarding.   Musculoskeletal:      Cervical back: Neck supple.      Right lower leg: No edema.      Left lower leg: No edema.   Skin:     Capillary Refill: Capillary refill takes less than 2 seconds.      Coloration: Skin is not jaundiced.      Findings: No bruising or rash.   Neurological:      General: No focal deficit present.     Fluids:  In: 1094.1 [Other:120; Enteral:180]  Out: 110     LABS:  Recent Labs     08/12/23  1035 08/13/23  0334 08/14/23  0355   SODIUM 138 137 136   POTASSIUM 3.9 3.8 3.9   CHLORIDE 101 99 98 "   CO2 20 19* 18*   GLUCOSE 186* 182* 197*   BUN 46* 68* 107*   CREATININE 2.15* 3.28* 4.97*   CALCIUM 8.8 8.8 8.6         IMAGING:   All imaging reviewed from admission to present day    IMPRESSION:  # ELIAS with Anuria- multifactorial with Hypoperfusion/Septic Shock, and JUAN  # Acidosis  # Hypotension - off vasopressor support.  # Septic Shock  # Diffuse Pneumonitis  # Single Segmental Pulm Embolism  # Pulmonary Alveolar Hemorrhage  # Intracardiac thrombus - serologies sent  # Acute Respiratory Failure with Hypoxia on Vent support.      PLAN:  - No urgent need for dialysis today  - Discussed with daughter, patient not a good candidate for dialysis at this time given will not change outcome of respiratory status which is limiting  - If comfort care measures please let nephrology know  - As likely in DIC, high risk of bleeding with renal biopsy thus would hold off at this time  - If full cares are pursued including dialysis by family, please place temp HD catheter and notify nephrology for dialysis  - Daily evaluation for RRT needs  - Dose all meds per eGFR < 15  - Keep MAP >/= to 65  - Monitor IO   Discussed with ICU attending        Total CCM > 35 mins

## 2023-08-14 NOTE — PROGRESS NOTES
[facility] ICU Progress Note      Admit Date: 8/11/2023    Resident(s): Lisa Montalvo M.D.   Attending:  PEDRITO CAVANAUGH/ Dr. Holt    Patient ID:    Name:  Afsaneh Lyn   YOB: 1940  Age:  83 y.o.  female   MRN:  [id number]    Hospital Course (carried forward and updated):  Afsaneh Lyn is a 83 y.o. female who presented as direct admit from [facility] for acute resp failure, intubated. Pt presented 8/11 at OSH for worsening short of breath. Reportedly, pt went to ED OSH 2 days prior and was diagnosed with pneumonia and sent home with antibiotics. At ED OSH, pt was profoundly hypoxic, 60-70%, CXR reportedly had near complete opacification of lungs bilaterally, hypotensive. Started on Unasyn.     Upon ICU arrival, pt was re intubated (ET tube changed), FIO2 100%/ PEEP 8. SBP in 100/60s. Quick bedside POCUS with mod-severely depressed LVEF, dilated RV. TAPSE not able to be well visualized. IVC is full >2 cm. Pt later developed profound hypotension in 50-60s/30s, 1L bolus LR, 2 amps of bicarb, total 1000 mcg phenylephrine stick was given. Norepinephrine gtt was started. Epinephrine gtt was started. Left tibial IO was established right away due to lack of access. Right IJ central line was emergently placed. CTA chest with small PE in RUL. BUTCH with RV thrombus, RV pressure overload. No vegetations. s/p bronch with evidence of diffuse alveolar hemorrhage (8/11). US LE + DVT s/p IVC filter (8/12).    Consultants:  Critical Care  IR   Nephrology  Cardiology    Interval Events:  8/14: Trops trending up. PO2 63. Poor perfusion, BP 160s/90s.   8/13: worsening ELIAS 2/2 ATN 2/2 shock. Uptrending trops --> cardio consult BAL: NGTD. Pending autoimmune studies. MRI brain to r/o stroke in setting of hypoxia for 2h.  8/12:  BUTCH: AAA, Thrombus in RV, severe AR and TR. Pressors off; sedated. Hepatitis panel: negative.  8/11: admitted from [facility] for AHRF requiring intubation and  pressors    NEURO:   Ill-appearing, withdraws to pain in 4   CARDIOVASC:  BP 160s/90s, SR 100s. Trop 1.9K. Poor perfusion, cold, cyanotic, delayed capillary refill.   RESPIRATORY:  VD #4 APV 22/310/8/60 no secretions  GI/NUTRITION:  AST/ALT: 114/212, TB 0.5, NGT  RENAL/FLUID/LYTES: BUN/Cr: 107/4.97. UA: granular casts.  HEME/ONC:   WBC: 19.1, Hgb: 10.1, plt: 82. Fibrinogen 93, INR 1.75, aPTT 37.  INFECTIOUS D:  Unasyn. Procal: 6.35 --> 43.4, CRP: 25.57  ENDOCRINE:   Glu: 197    Vitals Range last 24h:  Temp:  [37.4 °C (99.3 °F)-37.7 °C (99.9 °F)] 37.7 °C (99.9 °F)  Pulse:  [] 61  Resp:  [19-59] 19  BP: (121-156)/() 142/90  SpO2:  [91 %-100 %] 92 %      Intake/Output Summary (Last 24 hours) at 8/14/2023 1339  Last data filed at 8/14/2023 0920  Gross per 24 hour   Intake 854.06 ml   Output 80 ml   Net 774.06 ml          Review of Systems   Unable to perform ROS: Intubated       PHYSICAL EXAM:  Vitals:    08/14/23 0300 08/14/23 0400 08/14/23 0600 08/14/23 0820   BP: (!) 140/95 (!) 139/91 (!) 142/90    Pulse: (!) 104 (!) 110 61    Resp: 19 (!) 23 19    Temp:  37.7 °C (99.9 °F)     TempSrc:  Bladder     SpO2: 94% 92% 92%    Weight:    58.9 kg (129 lb 13.6 oz)   Height:        Body mass index is 20.34 kg/m².    O2 therapy: Pulse Oximetry: 92 %, O2 Delivery Device: Ventilator    Date 08/14/23 0700 - 08/15/23 0659   Shift 1721-1746 0357-2091 3919-4276 24 Hour Total   INTAKE   Enteral 30   30     Free Water / Tube Flush 30   30   Shift Total 30   30   OUTPUT   Shift Total       NET 30   30          Physical Exam  Vitals and nursing note reviewed.   Constitutional:       Appearance: She is normal weight. She is ill-appearing and toxic-appearing.   HENT:      Head: Normocephalic and atraumatic.      Nose: Nose normal.      Comments: NGT     Mouth/Throat:      Mouth: Mucous membranes are dry.      Comments: ETT  Cardiovascular:      Rate and Rhythm: Normal rate and regular rhythm.      Pulses: Normal pulses.    Pulmonary:      Breath sounds: Rales present.   Chest:      Chest wall: No tenderness.   Abdominal:      General: Bowel sounds are normal. There is no distension.      Palpations: Abdomen is soft.      Tenderness: There is no abdominal tenderness. There is no guarding.   Musculoskeletal:         General: No deformity.      Cervical back: Neck supple.      Right lower leg: No edema.      Left lower leg: No edema.      Comments: SCDs   Skin:     General: Skin is dry.      Capillary Refill: Capillary refill takes 2 to 3 seconds.   Neurological:      Cranial Nerves: No cranial nerve deficit.      Sensory: No sensory deficit.         Recent Labs     08/12/23  0300 08/13/23  0248 08/14/23  1325   ISTATAPH 7.430 7.417 7.468   ISTATAPCO2 34.7 30.0 28.2   ISTATAPO2 326* 62* 63*   ISTATATCO2 24 20 21   [id number] 100* 92* 94   ISTATARTHCO3 23.0 19.3 20.5   ISTATARTBE -1 -4 -3   ISTATTEMP 37.7 C 37.2 C 37.3 C   ISTATFIO2 100 40 50   ISTATSPEC Arterial Arterial Arterial   ISTATAPHTC 7.420 7.414 7.464   [id number] 329* 63* 65       Recent Labs     08/12/23  0113 08/12/23  1035 08/13/23  0334 08/14/23  0355   SODIUM 137 138 137 136   POTASSIUM 3.7 3.9 3.8 3.9   CHLORIDE 100 101 99 98   CO2 20 20 19* 18*   BUN 34* 46* 68* 107*   CREATININE 1.55* 2.15* 3.28* 4.97*   MAGNESIUM 2.9*  --  3.1* 3.3*   PHOSPHORUS 4.5  --  5.3*  --    CALCIUM 8.5 8.8 8.8 8.6       Recent Labs     08/12/23  1035 08/13/23  0334 08/14/23  0355 08/14/23  0814   ALTSGPT 225* 191* 212*  --    ASTSGOT 289* 124* 114*  --    ALKPHOSPHAT 161* 161* 176*  --    TBILIRUBIN 0.7 0.4 0.5  --    PREALBUMIN  --   --   --  14.7*   GLUCOSE 186* 182* 197*  --        Recent Labs     08/11/23  1458 08/11/23  2225 08/12/23  0409 08/12/23  1056 08/13/23  0334 08/14/23  0909   RBC 3.31*  --  3.43*  --  3.17*  --    HEMOGLOBIN 10.4*  --  10.9*  --  10.1*  --    HEMATOCRIT 31.6*  --  32.0*  --  29.8*  --    PLATELETCT 384  --  165  --  82*  --    PROTHROMBTM  --  19.1*  --   19.1*  --  20.0*   APTT  --  34.5  --   --   --  37.0*   INR  --  1.65*  --  1.65*  --  1.75*       Recent Labs     08/11/23  1458 08/12/23  0409 08/12/23  1035 08/13/23  0334 08/14/23  0355   WBC 28.2* 16.1*  --  19.1*  --    NEUTSPOLYS  --  89.80*  --  93.40*  --    LYMPHOCYTES  --  4.60*  --  2.40*  --    MONOCYTES  --  4.70  --  3.20  --    EOSINOPHILS  --  0.00  --  0.00  --    BASOPHILS  --  0.10  --  0.10  --    ASTSGOT 54*  --  289* 124* 114*   ALTSGPT 44  --  225* 191* 212*   ALKPHOSPHAT 145*  --  161* 161* 176*   TBILIRUBIN 0.6  --  0.7 0.4 0.5         Meds:   labetalol  10 mg      niCARdipine (Cardene) 25 mg in  mL Standard Infusion  0-15 mg/hr 5 mg/hr (08/14/23 1205)    ampicillin-sulbactam (UNASYN) IV  3 g Stopped (08/13/23 1811)    famotidine  20 mg      Or    famotidine  20 mg      insulin regular  2-9 Units      And    dextrose bolus  25 g      Pharmacy  1 Each      ondansetron  4 mg      Respiratory Therapy Consult        senna-docusate  2 Tablet      And    polyethylene glycol/lytes  1 Packet      And    magnesium hydroxide  30 mL      And    bisacodyl  10 mg      lidocaine  2 mL      fentaNYL  100 mcg      And    fentaNYL  200 mcg      ipratropium-albuterol  3 mL      acetaminophen  650 mg      ondansetron  4 mg          Procedures:  8/12: IVC filter, Bronchoscopy  8/11: BUTCH, art line, bronchoscopy, reintubation, IO, R IJ    Imaging:  DX-CHEST-PORTABLE (1 VIEW)   Final Result      1.  Satisfactory support lines and tubes.   2.  Bilateral interstitial opacities could be related to pulmonary edema. There is developing airspace opacity in the right lung base, edema versus atelectasis and/or developing infection..         IR-INSERT IVC FILTER WITH IG & SI   Final Result      1. Ultrasound and fluoroscopic guided placement of an Argon Option retrievable IVC filter.      2. Inferior vena cavagram within normal limits with no evidence of caval thrombus or occlusion.      US-EXTREMITY VENOUS LOWER  BILAT   Final Result      EC-BUTCH W/O CONT   Final Result      CT-CTA CHEST PULMONARY ARTERY W/ RECONS   Final Result      1.  Single small pulmonary embolus in a segmental pulmonary artery to the right upper lobe.      2.  Extensive groundglass opacification throughout the lung fields consistent with pneumonitis or pulmonary edema.      3.  Small bilateral pleural effusions.      4.  Significant right heart strain.            EC-ECHOCARDIOGRAM COMPLETE W/ CONT   Final Result      OUTSIDE IMAGES-DX CHEST   Final Result      DX-CHEST-PORTABLE (1 VIEW)   Final Result      1.  Satisfactory support lines and tubes.   2.  No significant interval change.      DX-CHEST-PORTABLE (1 VIEW)   Final Result      1.  Mild interstitial pulmonary edema.   2.  Interval placement of an endotracheal tube which terminates in satisfactory position at the level of the aortic arch.   3.  Interval insertion of a central venous catheter which terminates with the tip projecting over the expected region of the mid to distal superior vena cava.   4.  Interval placement of an enteric feeding tube which courses towards the stomach with the distal tip not visualized.      DX-ABDOMEN FOR TUBE PLACEMENT   Final Result      Enteric feeding tube tip terminates in the left abdomen over the expected location of the stomach.      MR-BRAIN-W/O    (Results Pending)       ASSESSEMENT and PLAN:    * Acute respiratory failure with hypoxia (HCC)  Assessment & Plan  Likely 2/2 diffuse pneumonitis vs PE vs HF vs infectious process  -8/11 Intubated by EMS flight crew at OSH.   -Reportedly CXR showed near complete opacification in bilateral lungs. CD from OSH was being uploaded to media.  -8/11 S/p ET tube exchange from #6 to #7.5  -8/11 CT chest with extensive diffuse pneumonitis.   -8/11 Repeat bronch with evidence of bronchoalveolar hemorrhage.   -8/11 started on pulse steroids 1000mg IV daily x 3 days  -Sedation with Fentanyl PRN. Daily SAT/SBT if able  -Goal  sat >90%, pplat <30  -Negative viral panel.   -See plan for DAH  -BAL cultures: NGTD  -MRI brain pending to r/o stroke in setting of hypoxia >2h    Elevated troponin  Assessment & Plan  -Likely due to demand ischemia,   -Troponins uptrending; now 1971. ECG with TWI in septal leads. Repeat ECG with no change. TTE and BUTCH not showing abnormal wall motion.  -Continue serial troponin  -Pt not a candidate for heparin gtt or aspirin; IVC filter placed  -Cardiology on consult     Diffuse pulmonary alveolar hemorrhage  Assessment & Plan  -DAH noted on bronch 8/12  -CTA chest with small PE in RUL but extensive pneumonitis  - Ddx includes: CHF, ARDS, coagulopathy (increased INR and decreased fibrinogen) and, of course, autoimmune (work up pending (MARISEL, ANCA, GBM, APL) - in the setting of ELIAS, possible anti-GBM  -Solumedrol 1000mg IV daily x 3 days, then wean   -May need kidney biopsy  -Nephrology was consulted: see above for recs    Acute pulmonary embolism (HCC)  Assessment & Plan  -Small segmental PE in RUL noted on CTA   -US LE + extensive bilateral DVT  -Anticoagulation contraindicated due to DAH  -8/12 s/p IVC filter + SCDs    RV (right ventricular) mural thrombus  Assessment & Plan  -RV thrombus noted in TTE and BUTCH; No vegetation valve.   -APL diagnosis was entertained. Pending APL labs  -No known hx of APL in the family.   -Anticoagulation contraindicated due to DAH --> IVC filter placed 8/12    DVT (deep venous thrombosis) (HCC)  Assessment & Plan  -Pt has hx of prolong drive (3 hours) to her ranch up in [facility] multiple times  -US LE positive for extensive DVTs bilaterally  -Anticoagulation contraindicated due to diffuse alveolar hemorrhage.    -8/12 s/p IVC filter    Congestive heart failure   Assessment & Plan  -No history of HF. New onset decrease LVEF 40% and right-sded strain in the setting of PE. Severe AI/AR. Clinical picture compatible with cardiogenic shock.   - Start nicardipine infusion to  decrease afterload   - If no improvement in peripheral perfusion will consider dobutamine gtt   - Monitor hemodynamics     Acute kidney injury (HCC)  Assessment & Plan  -oliguric,  Likely in the setting of multifactorial: ATN 2/2 profound shock vs preexisting pulm-renal syndrome vs drug induced  -Monitor UOP  -Monitor electrolytes, acid-base disturbances.   -Nephrology consulted, no HD for now    Dissseminated Intravascular Coagulopathy  Assessment & Plan  -5 criteria. Suspected proinflammatory state now with multiorgan failure. Worsens prognosis.   - See plan for shock     Transaminitis  Assessment & Plan  -Likely 2/2 shock  -Total bilirubin normal  -Continue to monitor    Lactic acidosis  Assessment & Plan  -Initial lactate 9.6 mmol/L -->2.6  -Fluid resuscitation per sepsis protocol. See sepsis section   -Serial lactates until <2    Shock (HCC)- (present on admission)  Assessment & Plan  2/2 likely cardiogenic +/- septic +/- distributive  -S/p sepsis protocol  -Lactate improving, BP stable, capillary refill remains slow  -LVEF 40-45% with evidence of RV pressure load + AR/TR  -On NE gtt on 8/11, Keep MAP >65  -Unasyn # 1/5  -Bcx: NGTD  -Monitor UOP, goal >30cc/hr.       DISPO: NA    CODE STATUS: DNAR/ I OK    Quality Measures:  Feeding: NGT  Analgesia: None   Sedation: None  Thromboprophylaxis: SCDs  Head of bed: >30 degrees  Ulcer prophylaxis: famotidine  Glycemic control: ISS  Bowel care: bowel regimen: yes  Indwelling lines: pIV, R IJ, art line  Deescalation of antibiotics: jules Montalvo M.D.

## 2023-08-14 NOTE — PROCEDURES
INPATIENT STAT VIDEO ELECTROENCEPHALOGRAM REPORT    REFERRING PROVIDER: Graeme Hotl M.d.  DOS: 8/14/2023  STUDY DURATION: 0 hours and 25 minutes of total recording time.     INDICATION:  Afsaneh Lyn 83 y.o. female presenting with altered mental status    RELEVANT MEDICATIONS/TREATMENTS:   Scheduled Medications   Medication Dose Frequency    ampicillin-sulbactam (UNASYN) IV  3 g Q EVENING    famotidine  20 mg DAILY    Or    famotidine  20 mg DAILY    insulin regular  2-9 Units Q6HRS    Pharmacy  1 Each PHARMACY TO DOSE    senna-docusate  2 Tablet BID       TECHNIQUE:   Routine VEEG was set up by a Neurodiagnostic technologist who performed education to the patient and staff. A minimum of 23 electrodes and 23 channel recording was setup and performed by Neurodiagnostic technologist, in accordance with the international 10-20 system. The study was reviewed in bipolar and referential montages. The recording examined the patient in the  drowsy/sleep and/or comatose state(s).     DESCRIPTION OF THE RECORD:  There is continuous generalized delta and theta slowing of the background. No anterior to posterior gradient or posterior reactive rhythm is observed. The EEG is variable and reactive (example 12:33 with nailbed pressure).       ICTAL AND INTERICTAL FINDINGS:   Frequent generalized quasi-periodic discharges of a triphasic morphology, with anterior-posterior lag, at times more prominent on the left hemisphere.     No regional slowing or persistent focal asymmetries were seen.    No seizures.     ACTIVATION PROCEDURES:   Intermittent Photic stimulation was performed in a stepwise fashion from 1 to 30 Hz and did not elicited any abnormalities on EEG.     EKG: Sampling of the EKG recording showed sinus rhythm    EVENTS:  None    INTERPRETATION:   Abnormal video EEG recording in the drowsy/sleep and/or comatose state(s) due to:  1) Frequent generalized quasi-periodic discharges of a triphasic morphology, at  times more prominent on left  2) Continuous generalized delta/theta slowing of the background    Comments: Findings are suggestive of moderate diffuse cerebral dysfunction, likely related to an underlying toxic/metabolic process. No seizures or definitively epileptiform discharges.         Department of Neurology at [facility]  General Neurologist and Epileptologist   of Clinical Neurology, [facility].

## 2023-08-14 NOTE — PROGRESS NOTES
Pt had 9 second run of PSVT heart rate 170, and converted self back to SR-ST   Dr. Parada notified. Electrolytes checked , no new orders at this time.

## 2023-08-14 NOTE — CARE PLAN
The patient is Watcher - Medium risk of patient condition declining or worsening    Shift Goals  Clinical Goals: Maintain hemodynamic stability, oxygen saturation and SBP< 160  Patient Goals: CJ  Family Goals: Updates    Progress made toward(s) clinical / shift goals:    Problem: Knowledge Deficit - Standard  Goal: Patient and family/care givers will demonstrate understanding of plan of care, disease process/condition, diagnostic tests and medications  Description: Target End Date:  1-3 days or as soon as patient condition allows    Document in Patient Education    1.  Patient and family/caregiver oriented to unit, equipment, visitation policy and means for communicating concern  2.  Complete/review Learning Assessment  3.  Assess knowledge level of disease process/condition, treatment plan, diagnostic tests and medications  4.  Explain disease process/condition, treatment plan, diagnostic tests and medications  Outcome: Progressing  Note: Patient's family participated in a care conference today where Dr. Holt explained how all body systems were being effected and educated them on options and next steps for plan of care      Problem: Safety - Medical Restraint  Goal: Remains free of injury from restraints (Restraint for Interference with Medical Device)  Description: INTERVENTIONS:  1. Determine that other, less restrictive measures have been tried or would not be effective before applying the restraint  2. Evaluate the patient's condition at the time of restraint application  3. Educate patient/family regarding the reason for restraint  4. Q2H: Monitor safety, psychosocial status, comfort, circulation, respiratory status, LOC, nutrition and hydration  Outcome: Progressing  Goal: Free from restraint(s) (Restraint for Interference with Medical Device)  Description: INTERVENTIONS:  1.  ONCE/SHIFT or MINIMUM Q12H: Assess and document the continuing need for restraints  2.  Q24H: Continued use of restraint requires  LIP to perform face to face examination and written order  3.  Identify and implement measures to help patient regain control  4.  Educate patient/family on discontinuation criteria   5.  Assess patient's understanding and retention of education provided  6.  Assess readiness for release & initiate progressive release per protocol  7.  Identify and document criteria for restraints  Outcome: Progressing  Note: Restraints removed from patient today due to discontinue criteria being met     Problem: Pain - Standard  Goal: Alleviation of pain or a reduction in pain to the patient’s comfort goal  Description: Target End Date:  Prior to discharge or change in level of care    Document on Vitals flowsheet    1.  Document pain using the appropriate pain scale per order or unit policy  2.  Educate and implement non-pharmacologic comfort measures (i.e. relaxation, distraction, massage, cold/heat therapy, etc.)  3.  Pain management medications as ordered  4.  Reassess pain after pain med administration per policy  5.  If opiods administered assess patient's response to pain medication is appropriate per POSS sedation scale  6.  Follow pain management plan developed in collaboration with patient and interdisciplinary team (including palliative care or pain specialists if applicable)  Outcome: Progressing  Note: Pain assessed throughout shift with a CPOT goal of 0     Problem: Hemodynamics  Goal: Patient's hemodynamics, fluid balance and neurologic status will be stable or improve  Description: Target End Date:  Prior to discharge or change in level of care    Document on Assessment and I/O flowsheet templates    1.  Monitor vital signs, pulse oximetry and cardiac monitor per provider order and/or policy  2.  Maintain blood pressure per provider order  3.  Hemodynamic monitoring per provider order  4.  Manage IV fluids and IV infusions  5.  Monitor intake and output  6.  Daily weights per unit policy or provider order  7.   Assess peripheral pulses and capillary refill  8.  Assess color and body temperature  9.  Position patient for maximum circulation/cardiac output  10. Monitor for signs/symptoms of excessive bleeding  11. Assess mental status, restlessness and changes in level of consciousness  12. Monitor temperature and report fever or hypothermia to provider immediately. Consideration of targeted temperature management.  Outcome: Progressing  Note: Labetalol ordered PRN and Nicardipine drip started to maintain a SBP< 140       :

## 2023-08-14 NOTE — CARE PLAN
The patient is Watcher - Medium risk of patient condition declining or worsening    Shift Goals  Clinical Goals: maintain hemodynamic stability  Patient Goals: CJ  Family Goals: Imaging    Progress made toward(s) clinical / shift goals:    Problem: Bowel Elimination  Goal: Establish and maintain regular bowel function  Outcome: Not Progressing  Note: Escalating bowel protocol, tolerating tube feed     Problem: Safety - Medical Restraint  Goal: Remains free of injury from restraints (Restraint for Interference with Medical Device)  Outcome: Progressing  Note: Restraint checks and charting in place     Problem: Pain - Standard  Goal: Alleviation of pain or a reduction in pain to the patient’s comfort goal  Outcome: Progressing  Note: Pain controlled with interventions       Patient is not progressing towards the following goals:      Problem: Bowel Elimination  Goal: Establish and maintain regular bowel function  Outcome: Not Progressing  Note: Escalating bowel protocol, tolerating tube feed

## 2023-08-14 NOTE — CONSULTS
Cardiology Initial Consultation    Date of Service  8/13/2023    Referring Physician  Mustapha Jackson D.O.    Reason for Consultation  Dilated cardiomyopathy, elevated troponin level possibly acute coronary syndrome, right ventricular thrombus unable to tolerate anticoagulation, ascending aortic aneurysm.    History of Presenting Illness  Arielle Lyn is a 83 y.o. female with a past medical history of hypertension who presented 8/11/2023 as a transfer from [facility] for pneumonia, respiratory failure requiring ventilation support complicated by diffuse alveolar hemorrhage, shock requiring pressor support. In addition she has been diagnosed with dilated cardiomyopathy, acute coronary syndrome, right ventricular thrombus unable to tolerate anticoagulation, ascending aortic aneurysm, pulmonary embolism/deep venous thrombosis with IVC filter placement, acute kidney injury, anemia. She underwent transesophageal echocardiogram as described. She has been made do not necessitate code status.    Review of Systems  Review of Systems   Unable to perform ROS: Intubated       Past Medical History   has a past medical history of Chickenpox, Diverticulosis, [place] measles, Hypertension, Hypothyroid, IBS (irritable bowel syndrome), Influenza, Migraines, Mitral valve prolapse, and Mumps.    She has no past medical history of Breast cancer (HCC).    Surgical History   has a past surgical history that includes abdominal hysterectomy total; pr breast augmentation with implant; and us-needle core bx-breast panel.    Family History  family history is not on file.    Social History   reports that she has never smoked. She has never used smokeless tobacco. She reports that she does not currently use alcohol after a past usage of about 0.6 oz of alcohol per week. She reports that she does not use drugs.    Medications  Prior to Admission Medications   Prescriptions Last Dose Informant Patient Reported? Taking?    Aspirin-Acetaminophen-Caffeine (EXCEDRIN PO)   Yes No   Sig: Take  by mouth. Last dose 3 pm   FLUoxetine (PROZAC) 40 MG capsule   No No   Sig: Take 1 Cap by mouth every day.   LORazepam (ATIVAN) 1 MG Tab   Yes No   Sig: TAKE 1 TABLET BY MOUTH AT BEDTIME FOR 30 DAYS G47.00   Omega-3 Fatty Acids (FISH OIL CONCENTRATE PO)   Yes No   Sig: Take  by mouth.   VITAMIN D, CHOLECALCIFEROL, PO   Yes No   Sig: Take  by mouth.   estradiol (ESTRACE) 0.5 MG tablet   Yes No   Sig: Take 0.5 mg by mouth every day.   levothyroxine (SYNTHROID) 150 MCG Tab   No No   Sig: TAKE ONE TABLET BY MOUTH EVERY MORNING ON EMPTY STOMACH      Facility-Administered Medications: None       Allergies  Allergies   Allergen Reactions    Sulfa Drugs     Macrobid [Nitrofurantoin Monohydrate Macrocrystals] Nausea     Cramp in legs       Vital signs in last 24 hours  Temp:  [36.9 °C (98.4 °F)-37.3 °C (99.1 °F)] 37.3 °C (99.1 °F)  Pulse:  [] 103  Resp:  [20-59] 46  BP: (107-143)/(62-95) 143/95  SpO2:  [83 %-98 %] 95 %    Physical Exam  Physical Exam  Constitutional:       Appearance: She is cachectic. She is ill-appearing.      Interventions: She is sedated and intubated.   HENT:      Head: Normocephalic and atraumatic.   Eyes:      Comments: Closed.   Pulmonary:      Effort: She is intubated.         Lab Review  Lab Results   Component Value Date/Time    WBC 19.1 (H) 08/13/2023 03:34 AM    RBC 3.17 (L) 08/13/2023 03:34 AM    HEMOGLOBIN 10.1 (L) 08/13/2023 03:34 AM    HEMATOCRIT 29.8 (L) 08/13/2023 03:34 AM    MCV 94.0 08/13/2023 03:34 AM    MCH 31.9 08/13/2023 03:34 AM    MCHC 33.9 08/13/2023 03:34 AM    MPV 11.6 08/13/2023 03:34 AM      Lab Results   Component Value Date/Time    SODIUM 137 08/13/2023 03:34 AM    POTASSIUM 3.8 08/13/2023 03:34 AM    CHLORIDE 99 08/13/2023 03:34 AM    CO2 19 (L) 08/13/2023 03:34 AM    GLUCOSE 182 (H) 08/13/2023 03:34 AM    BUN 68 (H) 08/13/2023 03:34 AM    CREATININE 3.28 (H) 08/13/2023 03:34 AM    CREATININE 0.8  "09/12/2008 12:30 PM      Lab Results   Component Value Date/Time    ASTSGOT 124 (H) 08/13/2023 03:34 AM    ALTSGPT 191 (H) 08/13/2023 03:34 AM     Lab Results   Component Value Date/Time    CHOLSTRLTOT 108 08/12/2023 01:13 AM    LDL 42 08/12/2023 01:13 AM    HDL 54 08/12/2023 01:13 AM    TRIGLYCERIDE 62 08/12/2023 01:13 AM    TROPONINT 1971 (H) 08/13/2023 02:52 PM       No results for input(s): NTPROBNP in the last 72 hours.    Cardiac Imaging and Procedures Review  CARDIAC STUDIES/PROCEDURES:    CTA OF CHEST (08/11/23)  1.  Single small pulmonary embolus in a segmental pulmonary artery to the right upper lobe.  2.  Extensive groundglass opacification throughout the lung fields consistent with pneumonitis or pulmonary edema.  3.  Small bilateral pleural effusions.  4.  Significant right heart strain.  (study result reviewed)     ECHOCARDIOGRAM CONCLUSIONS (08/11/23)  Mildly to moderately reduced left ventricular systolic function. The   left ventricular ejection fraction is visually estimated to be 40-45%.    Flattened septum in systole and diastole (\"D\"-shaped left ventricle)   consistent with RV pressure and volume overload.  The right ventricle is dilated with decreased systolic function.    Irregular echogenic density seen in mid cavity, consider thrombus.  Enlarged aortic root size measuring of 4.3 cm in diameter above the   sinotubular junction.  Mobile hyperechoic density seen in ascending aorta, not well seen,   artifacts, but cannot unable to exclude dissection.  Mildly sclerotic trileaflet aortic valve without significant stenosis   and mild to moderate regurgitation.  Moderate to severe tricuspid regurgitation.Estimated right ventricular   systolic pressure is 51 mmHg.  Right atrial pressure is estimated to be 15 mmHg.  Mobile hyperechoic density (0.8 cm x 1.4 cm) seen attached either on   tricuspid valve tip or chord, possible thrombus vs vegetation.Trivial   pericardial effusion without echo evidence of " tamponade.  No prior study is available for comparison.   Discussed results with intensivist.  (study result reviewed)     EKG performed on (08/12/23) was reviewed: EKG personally interpreted shows sinus rhythm with inferior ST segment abnormalities, anterior T wave inversions.     IVC FILTER PLACEMENT (08/12/23)  1. Ultrasound and fluoroscopic guided placement of an Argon Option retrievable IVC filter.   2. Inferior vena cavagram within normal limits with no evidence of caval thrombus or occlusion.    LOWER EXTREMITY VENOUS ULTRASOUND (08/12/23)  Acute to subacute, occlusive thrombus in the right popliteal, posterial tibial and peroneal veins.   Acute to subacute, occlusive thrombus in the mid and distal left superficial femoral vein, posterior tibial vein and peroneal vein.   (study result reviewed)     TRANSESOPHAGEAL ECHOCARDIOGRAM CONCLUSIONS by Darlene Swain (08/11/23)  Ascending aortic aneurysm without clear evidence of dissection.    Multiple echogenic densities seen in right ventricle more consistent with thrombus then vegetation, but cannot rule out vegetation.  Moderate to severe aortic and tricuspid regurgitation.  (study result reviewed)     Assessment/Plan  Dilated cardiomyopathy, elevated troponin level possibly acute coronary syndrome, right ventricular thrombus unable to tolerate anticoagulation, ascending aortic aneurysm: She is a 83 y.o. female with a past medical history of hypertension who presented as a transfer from [facility] for pneumonia, respiratory failure requiring ventilation support complicated by diffuse alveolar hemorrhage, shock requiring pressor support. In addition she has been diagnosed with dilated cardiomyopathy, acute coronary syndrome, right ventricular thrombus unable to tolerate anticoagulation, ascending aortic aneurysm, pulmonary embolism/deep venous thrombosis with IVC filter placement, acute kidney injury, anemia. She underwent transesophageal  echocardiogram as described. She has been made do not necessitate code status. As she remain critically ill, the current course of action appears appropriate.      Thank you for allowing me to participate in the care of this patient.    I will continue to follow this patient    Please contact me with any questions.    Kenny Sykes M.D.   Cardiologist, [facility]  [phone]

## 2023-08-14 NOTE — PROGRESS NOTES
CARDIOLOGY PROGRESS NOTE      Date: 8/14/2023      Patient Name: Afsaneh Lyn  YOB: 1940  MRN: [id number]    Assessment/Recommendations:   # Aortic regurgitation: While her BUTCH demonstrated multiple regurgitant jets, she does not have other findings clearly consistent with severe aortic regurgitation such as early closure of the mitral valve, a short pressure half-time, or holodiastolic flow reversal in the descending aorta.  Additionally, in the absence of endocarditis or aortic dissection, acute severe aortic regurgitation is unusual and her BUTCH did not show an aortic abscess or vegetation.  Her blood cultures have been negative, although she did receive antibiotics prior to her admission.    Regardless, in her current condition she is not a good surgical candidate, which would be the treatment for acute severe aortic regurgitation.  For now, it would be reasonable to control her systolic blood pressure to <120 mmHg and diurese remove fluid with dialysis/CRRT as tolerated.    # Possible pneumonitis, alveolar hemorrhage, DVT/PE: Defer management to primary service.    # DVT/PE, right ventricular thrombus/vegetation: Would continue to follow blood cultures to fully exclude endocarditis.  Would start anticoagulation when considered safe per the primary service.    # Right ventricular dysfunction/pulmonary hypertension, tricuspid regurgitation: Unclear etiology at this time, although a small PE as reported on her CT is unlikely to be the cause.  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    # Left ventricular systolic dysfunction: Assessment of her true left ventricular function is difficult given significant compression by the enlarged right ventricle.  At this time, she is not a candidate for initiation of neurohormonal blockade.    # Severe acute kidney injury: Agree with nephrology consultation if patient's family wishes to pursue  "invasive treatments.    Disposition:   Cardiology will peripherally follow at this time.  If she reasonably recovers and is potentially a surgical candidate, then we can consider reevaluation of her aortic valve.    Please contact us if there are any questions or concerns.    Events/Subjective:   Patient remains intubated and sedated.  Vasoactive agents have been weaned and she is currently on nicardipine for afterload reduction.  Per family she was relatively healthy recently except for complaints of right, followed by left, posterior knee pain and rapidly decompensated in the past few days.    Medications:     Scheduled Medications   Medication Dose Frequency    ampicillin-sulbactam (UNASYN) IV  3 g Q EVENING    famotidine  20 mg DAILY    Or    famotidine  20 mg DAILY    insulin regular  2-9 Units Q6HRS    Pharmacy  1 Each PHARMACY TO DOSE    senna-docusate  2 Tablet BID     Current Outpatient Medications   Medication Instructions    Aspirin-Acetaminophen-Caffeine (EXCEDRIN PO) Oral, Last dose 3 pm     estradiol (ESTRACE) 0.5 mg, Oral, EVERY DAY    fluoxetine (PROZAC) 40 mg, Oral, EVERY DAY    levothyroxine (SYNTHROID) 150 MCG Tab TAKE ONE TABLET BY MOUTH EVERY MORNING ON EMPTY STOMACH    LORazepam (ATIVAN) 1 MG Tab TAKE 1 TABLET BY MOUTH AT BEDTIME FOR 30 DAYS G47.00    Omega-3 Fatty Acids (FISH OIL CONCENTRATE PO) Oral    VITAMIN D, CHOLECALCIFEROL, PO Oral       Vitals:   /68   Pulse 99   Temp 37.9 °C (100.2 °F) (Bladder)   Resp (!) 25   Ht 1.702 m (5' 7\")   Wt 58.9 kg (129 lb 13.6 oz)   SpO2 97%    BP Readings from Last 4 Encounters:   08/14/23 109/68   06/27/23 (!) 140/80   06/15/22 130/84   04/06/22 120/80     Wt Readings from Last 4 Encounters:   08/14/23 58.9 kg (129 lb 13.6 oz)   06/27/23 53.8 kg (118 lb 9.7 oz)   06/15/22 52.2 kg (115 lb)   04/06/22 52.6 kg (116 lb)     Body mass index is 20.34 kg/m².    Physical Examination:   General: Intubated and sedated  Neck: Unable to assess range of " motion, unable to assess jugular venous distension  Pulmonary: Intubated, maintaining saturations on current vent settings  Cardiovascular: Tachycardic, minor ectopy on telemetry  Gastrointestinal: Thin, nondistended  Extremities: Warm, no significant lower extremity edema  Neurological: Unable to assess due to sedation  Psychiatric: Unable to assess due to sedation    Laboratories:   Estimated Creatinine Clearance: 8.3 mL/min (A) (by C-G formula based on SCr of 4.97 mg/dL (HH)).  Recent Labs     08/12/23  0113 08/12/23  1035 08/13/23  0334 08/14/23  0355   CREATININE 1.55* 2.15* 3.28* 4.97*   BUN 34* 46* 68* 107*   POTASSIUM 3.7 3.9 3.8 3.9   SODIUM 137 138 137 136   CALCIUM 8.5 8.8 8.8 8.6   MAGNESIUM 2.9*  --  3.1* 3.3*   CO2 20 20 19* 18*   ALBUMIN  --  2.7* 2.5* 2.7*     Recent Labs     08/12/23  1035 08/13/23  0334 08/14/23  0355   GLUCOSE 186* 182* 197*     Recent Labs     08/11/23  2225 08/12/23  1035 08/12/23  1056 08/13/23  0334 08/14/23  0355 08/14/23  0909   ASTSGOT  --  289*  --  124* 114*  --    ALTSGPT  --  225*  --  191* 212*  --    ALKPHOSPHAT  --  161*  --  161* 176*  --    INR 1.65*  --  1.65*  --   --  1.75*     Recent Labs     08/12/23  0409 08/13/23  0334   WBC 16.1* 19.1*   HEMOGLOBIN 10.9* 10.1*   PLATELETCT 165 82*     Recent Labs     08/13/23  0206 08/13/23  1026 08/13/23  1452   TROPONINT 1584* 1874* 1971*     Lab Results   Component Value Date/Time    LDL 42 08/12/2023 0113     (H) 06/29/2021 0850     (H) 11/07/2014 0804     Lab Results   Component Value Date/Time    HDL 54 08/12/2023 0113    HDL 66 06/29/2021 0850    HDL 97 11/07/2014 0804       Lab Results   Component Value Date/Time    TRIGLYCERIDE 62 08/12/2023 0113    TRIGLYCERIDE 73 06/29/2021 0850    TRIGLYCERIDE 69 11/07/2014 0804       Lab Results   Component Value Date/Time    CHOLSTRLTOT 108 08/12/2023 0113    CHOLSTRLTOT 206 (H) 06/29/2021 0850    CHOLSTRLTOT 254 (H) 11/07/2014 0804       Telemetry:   Sinus  tachycardia with intermittent PVCs and PACs    Cardiac Studies and Procedures:   See above for review of pertinent cardiac findings.    A total of 40 minutes of critical care time was spent evaluating and assisting with management of this critically ill patient remains intubated and sedated with severe acute kidney injury, biventricular systolic dysfunction and valvular dysfunction, and need for anticoagulation in the setting of significant/life-threatening bleeding risk.      Lauro Luna MD, FACC, [facility]  Interventional Cardiology  [facility] [place] [facility], [address]  [address] [address]  Phone: [phone]  Fax: [phone]

## 2023-08-14 NOTE — PROGRESS NOTES
Critical Care Progress Note    Date of admission  8/11/2023    Chief Complaint  Afsaneh Lyn is a 83 y.o. female who presented as direct admit from [facility] for acute resp failure, intubated. Pt presented 8/11 at OSH for worsening short of breath. Hypoxic in 60-70%, hypotensive. Received 1L fluid bolus. Lactate 21 mg/dl and increased to 87 mg/dl. Unasyn given. Pt was intubated by flight crew before transfer to [facility]     Upon ICU arrival, pt was on the vent FIO2 100%/ PEEP 8. On ketamine gtt. SBP in 100/60s. Quick bedside POCUS with mod-severely depressed LVEF, dilated RV. RV free wall not able to be well visualized. IVC is full >2 cm. Pt later developed profound hypotension in 50-60s/30s, 1L bolus LR, 2 amps of bicarb, total 1000 mcg phenylephrine stick was given. Norepinephrine gtt was started. Epinephrine gtt was started. Left tibial IO was established right away due to lack of access. Right IJ central line was emergently placed. ABG 7.33/33/161     Reportedly, pt went to ED OSH 2 days prior and was diagnosed with pneumonia and sent home with antibiotics. Pt came back with worsening SOB. At ED OSH, pt was profoundly hypoxic, 60-70%, CXR reportedly had near complete opacification of lungs bilaterally.    Hospital Course  8/11 direct admit from [facility]  8/11 s/p bronch with evidence of DAH  8/11 started pulse steroid 1000mg IV daily x 3 days  8/11 CTA chest with small PE in RUL. BUTCH with RV thrombus, RV pressure overload. No vegetations  8/12 US LE + DVT  8/12 s/p IVC filter  8/13     Interval Problem Update  Reviewed last 24 hour events:  PSVT o/n, increasing 02 requirement    Neuro:   RASS -3    CV:  HR 110s  -150  MAPs 100  Vasoactives off    Resp:   APV  22/310/8/0.7  SP02 88-90%  CXR worsening diffuse pulmonary opacities  7.4.30/62/92%      GI:     I/O: + 1L  UOP: 110    Tmax: AF  Heme: WBC 19    Abx: Unasyn. Azithro  Procal 43.4    Micro:  NGTD  FLU/CoVID negative    Endo: BG WTR    LDA: RIJ CVC, LRAL, ETT, Arizmendi, ETT  SUP: H2 RA  VTE: Held  Diet: TFs        Review of Systems  Review of Systems   Unable to perform ROS: Critical illness        Vital Signs for last 24 hours   Temp:  [37.4 °C (99.3 °F)-37.7 °C (99.9 °F)] 37.7 °C (99.9 °F)  Pulse:  [] 61  Resp:  [19-59] 19  BP: (121-156)/() 142/90  SpO2:  [90 %-100 %] 92 %    Hemodynamic parameters for last 24 hours       Respiratory Information for the last 24 hours  Vent Mode: APVCMV  Rate (breaths/min): 22  Vt Target (mL): 310  PEEP/CPAP: 8  MAP: 12  Control VTE (exp VT): 463    Physical Exam   Physical Exam  Constitutional:       Appearance: She is ill-appearing.   HENT:      Head: Normocephalic.      Nose: No congestion.      Mouth/Throat:      Mouth: Mucous membranes are moist.      Pharynx: No oropharyngeal exudate.   Eyes:      Pupils: Pupils are equal, round, and reactive to light.   Cardiovascular:      Rate and Rhythm: Tachycardia present. Rhythm irregular.      Heart sounds: Murmur heard.   Pulmonary:      Effort: Respiratory distress present.      Breath sounds: Rales present.   Abdominal:      General: Abdomen is flat.      Palpations: Abdomen is soft.   Musculoskeletal:         General: Swelling present.      Cervical back: Neck supple.      Right lower leg: Edema present.      Left lower leg: Edema present.   Skin:     Capillary Refill: Capillary refill takes more than 3 seconds.      Comments: Cold distal extremities with poor perfusion   Neurological:      Comments: No respose to painful stim, + gag and corneal    Roving eye movements, no deviation, crosses midline  No clonus, no asymeetries    Has increased respiratory drive              Medications  Current Facility-Administered Medications   Medication Dose Route Frequency Provider Last Rate Last Admin    labetalol (Normodyne/Trandate) injection 10 mg  10 mg Intravenous Q4HRS PRN Graeme Holt M.D.   10 mg at 08/14/23  0902    niCARdipine (Cardene) 25 mg in  mL Standard Infusion  0-15 mg/hr Intravenous Continuous Graeme Holt M.D.        ampicillin/sulbactam (Unasyn) 3 g in  mL IVPB  3 g Intravenous Q EVENING Mustapha Jackson D.O.   Stopped at 08/13/23 1811    famotidine (Pepcid) tablet 20 mg  20 mg Enteral Tube DAILY CHRISTY PriceOFlavio   20 mg at 08/14/23 0504    Or    famotidine (Pepcid) injection 20 mg  20 mg Intravenous DAILY Mustapha Jackson D.O.        insulin regular (HumuLIN R,NovoLIN R) injection  2-9 Units Subcutaneous Q6HRS CHRISTY PriceO.   2 Units at 08/14/23 0516    And    dextrose 50% (D50W) injection 25 g  25 g Intravenous Q15 MIN PRN Mustapha Jackson D.O.        Pharmacy Consult: Enteral tube insertion - review meds/change route/product selection  1 Each Other PHARMACY TO DOSE Mustapha Jackson D.O.        ondansetron (Zofran) syringe/vial injection 4 mg  4 mg Intravenous Q4HRS PRN Liliam Gates M.D.        Respiratory Therapy Consult   Nebulization Continuous RT Liliam Gates M.D.        senna-docusate (Pericolace Or Senokot S) 8.6-50 MG per tablet 2 Tablet  2 Tablet Enteral Tube BID Liliam Gates M.D.   2 Tablet at 08/14/23 0504    And    polyethylene glycol/lytes (Miralax) PACKET 1 Packet  1 Packet Enteral Tube QDAY PRN Liliam Gates M.D.   1 Packet at 08/13/23 0506    And    magnesium hydroxide (Milk Of Magnesia) suspension 30 mL  30 mL Enteral Tube QDAY PRN Liliam Gates M.D.   30 mL at 08/14/23 0504    And    bisacodyl (Dulcolax) suppository 10 mg  10 mg Rectal QDAY PRN Liliam Gates M.D.        lidocaine (Xylocaine) 1 % injection 2 mL  2 mL Tracheal Tube Q30 MIN PRN Liliam Gates M.D.        fentaNYL (Sublimaze) injection 100 mcg  100 mcg Intravenous Q15 MIN PRN Liliam Gates M.D.   100 mcg at 08/14/23 0234    And    fentaNYL (Sublimaze) injection 200 mcg  200 mcg Intravenous Q15 MIN PRN Liliam Bazzi  KATHERINE Gates        ipratropium-albuterol (DUONEB) nebulizer solution  3 mL Nebulization Q4H PRN (RT) Liliam Gates M.D.        acetaminophen (Tylenol) tablet 650 mg  650 mg Enteral Tube Q6HRS PRN Liliam Gates M.D.        ondansetron (Zofran ODT) dispertab 4 mg  4 mg Enteral Tube Q4HRS PRN Liliam Gates M.D.           Fluids    Intake/Output Summary (Last 24 hours) at 8/14/2023 1141  Last data filed at 8/14/2023 0920  Gross per 24 hour   Intake 994.06 ml   Output 90 ml   Net 904.06 ml       Laboratory  Recent Labs     08/11/23  1446 08/12/23  0300 08/13/23  0248   ISTATAPH 7.328* 7.430 7.417   ISTATAPCO2 33.0 34.7 30.0   ISTATAPO2 161* 326* 62*   ISTATATCO2 18* 24 20   XREOGQJ7ZJP 99 100* 92*   ISTATARTHCO3 17.3 23.0 19.3   ISTATARTBE -8* -1 -4   ISTATTEMP 37.2 C 37.7 C 37.2 C   ISTATFIO2 100 100 40   ISTATSPEC Arterial Arterial Arterial   ISTATAPHTC 7.325* 7.420 7.414   [id number] 162* 329* 63*         Recent Labs     08/12/23  0113 08/12/23  1035 08/13/23  0334 08/14/23  0355   SODIUM 137 138 137 136   POTASSIUM 3.7 3.9 3.8 3.9   CHLORIDE 100 101 99 98   CO2 20 20 19* 18*   BUN 34* 46* 68* 107*   CREATININE 1.55* 2.15* 3.28* 4.97*   MAGNESIUM 2.9*  --  3.1* 3.3*   PHOSPHORUS 4.5  --  5.3*  --    CALCIUM 8.5 8.8 8.8 8.6     Recent Labs     08/12/23  1035 08/13/23  0334 08/14/23  0355 08/14/23  0814   ALTSGPT 225* 191* 212*  --    ASTSGOT 289* 124* 114*  --    ALKPHOSPHAT 161* 161* 176*  --    TBILIRUBIN 0.7 0.4 0.5  --    PREALBUMIN  --   --   --  14.7*   GLUCOSE 186* 182* 197*  --      Recent Labs     08/11/23  1458 08/12/23  0409 08/12/23  1035 08/13/23  0334 08/14/23  0355   WBC 28.2* 16.1*  --  19.1*  --    NEUTSPOLYS  --  89.80*  --  93.40*  --    LYMPHOCYTES  --  4.60*  --  2.40*  --    MONOCYTES  --  4.70  --  3.20  --    EOSINOPHILS  --  0.00  --  0.00  --    BASOPHILS  --  0.10  --  0.10  --    ASTSGOT 54*  --  289* 124* 114*   ALTSGPT 44  --  225* 191* 212*    ALKPHOSPHAT 145*  --  161* 161* 176*   TBILIRUBIN 0.6  --  0.7 0.4 0.5     Recent Labs     08/11/23  1458 08/11/23  2225 08/12/23  0409 08/12/23  1056 08/13/23  0334 08/14/23  0909   RBC 3.31*  --  3.43*  --  3.17*  --    HEMOGLOBIN 10.4*  --  10.9*  --  10.1*  --    HEMATOCRIT 31.6*  --  32.0*  --  29.8*  --    PLATELETCT 384  --  165  --  82*  --    PROTHROMBTM  --  19.1*  --  19.1*  --  20.0*   APTT  --  34.5  --   --   --  37.0*   INR  --  1.65*  --  1.65*  --  1.75*       Imaging  X-Ray:  I have personally reviewed the images and compared with prior images.  CT:    Reviewed  Echo:   Reviewed    Assessment/Plan  * Acute respiratory failure with hypoxia (HCC)  Assessment & Plan  8/11 Intubated by EMS flight crew at OSH. Reportedly pt was in 60% oxygen sat, breathing 40s/min.   Pt was intubated with ET tube #6. No issues en route.   Reportedly CXR showed near complete opacification in bilateral lungs. CD from OSH was being uploaded to [facility]    At [facility], 8/11 S/p ET tube exchange from #6 to #7.5  8/11 CT chest with extensive diffuse pneumonitis.   8/11 Repeat bronch with evidence of bronchoalveolar hemorrhage.   8/11 started on pulse steroids 1000mg IV daily x 3 days  8/14 Worsening hypoxemia and pulmonary infiltrates with stable Hb, suspect pulmonary edema from AI, cardiogenic shock. Add afterload reduction      Overall FIO2/PEEP are improving with good lung compliance. Liberate Tv to 6/kg.    Plan:   Sedation with precedex and fentanyl. Daily SAT/SBT if able  Goal sat >90%, pplat <30  Autoimmune work up: MARISEL, APL labs, ANCA, Anti GBM negative so far  Infectious w/u unrevealing to date, NGTD in bronch or BCxs  Follow up BAL cultures.   Nephrology was consulted for concern of rising creatinine and anuric. May need dialysis    Encephalopathy acute  Assessment & Plan  Multifactorial given pt's shock, ELIAS, multiorgan failure  Rule out stroke given her high clot burden  MRI brain w/o contrast pending  EEG spot  today    Elevated troponin  Assessment & Plan  Trop in 100-200s. Recent troponin in 1255-0352  ECG with TWI in septal leads with ?mild ST elevation inferiorly. Repeat ECG with no change  Has evidence of significant AI and elevated BiV filling pressures  Pt won't be candidate for heparin gtt or aspirin  Suspect demand ischemia  Continue serial troponins  Suspect underlying cardiac disease, likely need left heart cath when pt is more stable.   Cardiology       Diffuse pulmonary alveolar hemorrhage  Assessment & Plan  DAH noted on bronch.   CTA chest with small PE in RUL but extensive pneumonitis  Autoimmune work up pending (MARISEL, ANCA, GBM, APL) negative to date  Solumedrol 1000mg IV daily x 3 days, then wean   Creatinine now rising (normal at admission), this is confounded by her shock. Could be ATN  Initial UA with trace occult blood, hyaline cast. Repeat UA with large RBC  May need kidney biopsy  Nephrology was consulted      Acute pulmonary embolism (HCC)  Assessment & Plan  Small segmental PE in RUL noted on CTA.   US LE + extensive bilateral DVT  Anticoagulation contraindicated due to DAH  8/12 s/p IVC filter.     RV (right ventricular) mural thrombus  Assessment & Plan  RV thrombus noted in TTE and BUTCH.   No vegetation valve.   APL diagnosis was entertained. Pending APL labs  No known hx of APL in the family.   Anticoagulation contraindicated due to DAH    DVT (deep venous thrombosis) (HCC)  Assessment & Plan  Pt has hx of prolong drive (3 hours) to her ranch [place] in [facility] multiple times  US LE positive for extensive DVTs bilaterally  Anticoagulation contraindicated due to diffuse alveolar hemorrhage.    8/12 s/p IVC filter    Acute kidney injury (HCC)  Assessment & Plan  Continue Oliguria  Etiology  - multifactorial. DDx include ATN from profound shock vs preexisting pulm renal syndrome vs drug induced  Monitor UOP  Monitor electrolytes, acid-base disturbances.   Nephrology  following    Transaminitis  Assessment & Plan  Likely related to shock. Monitor.   Total bilirubin normal    Lactic acidosis  Assessment & Plan  Initial lactate 9.6 mmol/L, now normalized post resuscitation and with supportive care  Serial lactates until <2      Shock (HCC)- (present on admission)  Assessment & Plan    Shock is likely combination of cardiogenic/septic?/distributive from PE given RV pressure overload though clot burden was fairly low considering the small segmental PE on CTA.   Also with mod/severe AI and hypertensive with elvated BiV filling pressures estimated on TTE, arguing for cardiogenic component.   LVEF 40-45% with evidence of RV pressure load.   8/12 s/p IVC filter  8/14  Vasopressors off  Unable to anticoagulate due to DAH  Start nicardipine to keep SBP <140 as afterload reduction    Serial lactates. Lactates improving.  Follow up blood cultures.  Monitor UOP, goal >30cc/hr.       Goals of care, counseling/discussion  Assessment & Plan  Updated family daily. Explained pt's critical condition. All questions were answered.          VTE:  Contraindicated  Ulcer: H2 Antagonist  Lines: Central Line  Ongoing indication addressed, Arterial Line  Ongoing indication addressed, and Arizmendi Catheter  Ongoing indication addressed    I have performed a physical exam and reviewed and updated ROS and Plan today (8/14/2023). In review of yesterday's note (8/13/2023), there are no changes except as documented above.     Discussed patient condition and risk of morbidity and/or mortality with Family, RN, RT, Therapies, Pharmacy, Patient, and cardiology and nephrology  The patient remains critically ill.  Critical care time = 85 minutes in directly providing and coordinating critical care and extensive data review.  No time overlap and excludes procedures.

## 2023-08-14 NOTE — HOSPITAL COURSE
8/11 direct admit from [facility]  8/11 s/p bronch with evidence of DAH  8/11 started pulse steroid 1000mg IV daily x 3 days  8/11 CTA chest with small PE in RUL. BUTCH with RV thrombus, RV pressure overload. No vegetations  8/12 US LE + DVT  8/12 s/p IVC filter  8/13

## 2023-08-15 PROBLEM — D65 DIC (DISSEMINATED INTRAVASCULAR COAGULATION) (HCC): Status: ACTIVE | Noted: 2023-01-01

## 2023-08-15 NOTE — PROGRESS NOTES
Dr. Holt met with family at bedside. Pt to transition to comfort care. All unnecessary equipment removed, pt medicated per orders.

## 2023-08-15 NOTE — ACP (ADVANCE CARE PLANNING)
Advanced Care Planing and Goals of Care       Date/time:  Continued dialogue yesterday and through today with multiple family members including  Felton and daughter Julissa    Medical decision maker: Julissa    Narrative of discussion: Have had a series of lengthy goals of care discussions with family over the last 48 hours since I took over ICU service.  Patient with multiorgan failure, critical illness, DIC, ARDS, cardiogenic shock and a overall extremely poor prognosis without a clearly reversible cause.    Have spent to significant amount of time with the family explaining and discussing her current clinical predicament and condition as well as all the therapies and diagnostics that have been performed to date.  Yesterday after we discussed clinical trajectory, and the options in potentiality's of initiating hyper invasive treatments namely renal replacement therapy, advanced diagnostics and some of her the diagnostic information including significant valvular dysfunction, biventricular function impairment and multiorgan failure it was decided amongst the family including DPOA and other family members that it would not really fall within her wishes to undergo such invasive therapies and prolonged ICU care, and post ICU syndrome and rehab and she values deeply her functional status and her independence.    As such we decided to treat her supportively and to hold off on renal replacement therapy yesterday and to really address the issue today.  Over the course of the last 24 hours the patient's condition is worsened significantly with worsening metabolic acidosis, worsening uremic encephalopathy, metabolic encephalopathy, multiple organ dysfunction, and urea, and now with profound and rapidly progressive DIC.    All of this told with multiorgan failure and portending a almost certain prognosis of mortality in the ICU or the potential of a catastrophic complication such as a clotting or bleeding events, CNS  dysfunction, and the almost certainty of a prolonged ICU stay, and prolonged rehabilitation they have decided against hyper invasive therapies and escalation of potentially not been beneficial care.    In the interest of the patient's values, wishes, and the way that the family has been able to share with me her passions and value of independence and functional status, we have decided to transition the patient to comfort care today rather than to escalate care and pursue hyper invasive therapies.    Summary: Comfort care    Time statement:  I discussed advance care planning with the patient's family and patient's DPOA for at least 35 minutes, including diagnosis, prognosis, plan of care, risks and benefits of any therapies that could be offered, as well as alternatives including palliation and hospice, as appropriate, exclusive of evaluation and management or other separately billable procedures.

## 2023-08-15 NOTE — CARE PLAN
The patient is Watcher - Medium risk of patient condition declining or worsening    Shift Goals  Clinical Goals: SBP < 140, maintain o2 sats  Patient Goals: CJ  Family Goals: patient comfort    Progress made toward(s) clinical / shift goals:  nicardipine titrated to maintain SBP <140 throughout shift, q2 turns in place, preventative mepilex in place, patient free from restraints.   Problem: Knowledge Deficit - Standard  Goal: Patient and family/care givers will demonstrate understanding of plan of care, disease process/condition, diagnostic tests and medications  Description: Target End Date:  1-3 days or as soon as patient condition allows    Document in Patient Education    1.  Patient and family/caregiver oriented to unit, equipment, visitation policy and means for communicating concern  2.  Complete/review Learning Assessment  3.  Assess knowledge level of disease process/condition, treatment plan, diagnostic tests and medications  4.  Explain disease process/condition, treatment plan, diagnostic tests and medications  Outcome: Progressing     Problem: Skin Integrity  Goal: Skin integrity is maintained or improved  Description: Target End Date:  Prior to discharge or change in level of care    Document interventions on Skin Risk/Kb flowsheet groups and corresponding LDA    1.  Assess and monitor skin integrity, appearance and/or temperature  2.  Assess risk factors for impaired skin integrity and/or pressures ulcers  3.  Implement precautions to protect skin integrity in collaboration with interdisciplinary team  4.  Implement pressure ulcer prevention protocol if at risk for skin breakdown  5.  Confirm wound care consult if at risk for skin breakdown  6.  Ensure patient use of pressure relieving devices  (Low air loss bed, waffle overlay, heel protectors, ROHO cushion, etc)  Outcome: Progressing     Problem: Safety - Medical Restraint  Goal: Remains free of injury from restraints (Restraint for  Interference with Medical Device)  Description: INTERVENTIONS:  1. Determine that other, less restrictive measures have been tried or would not be effective before applying the restraint  2. Evaluate the patient's condition at the time of restraint application  3. Educate patient/family regarding the reason for restraint  4. Q2H: Monitor safety, psychosocial status, comfort, circulation, respiratory status, LOC, nutrition and hydration  Outcome: Met     Problem: Pain - Standard  Goal: Alleviation of pain or a reduction in pain to the patient’s comfort goal  Description: Target End Date:  Prior to discharge or change in level of care    Document on Vitals flowsheet    1.  Document pain using the appropriate pain scale per order or unit policy  2.  Educate and implement non-pharmacologic comfort measures (i.e. relaxation, distraction, massage, cold/heat therapy, etc.)  3.  Pain management medications as ordered  4.  Reassess pain after pain med administration per policy  5.  If opiods administered assess patient's response to pain medication is appropriate per POSS sedation scale  6.  Follow pain management plan developed in collaboration with patient and interdisciplinary team (including palliative care or pain specialists if applicable)  Outcome: Progressing       Patient is not progressing towards the following goals:

## 2023-08-15 NOTE — PROGRESS NOTES
Spiritual Care Note    Patient's Name: Afsaneh Lyn   MRN: [id number]    YOB: 1940   Age and Gender: 83 y.o. female   Unit/Service Area: [facility]   Room (and Bed): [place] [address]   Ethnicity or Nationality:     Primary Language: English   Lutheran/Spiritual Preference: none   Place of Residence: [place], [place]   Family/Friends/Others Present:   daughter  son-in-law   Medical Diagnoses/Procedures: acute respiratory failure w/ hypoxia  encephalopathy acute  elevated troponin  diffuse pulmonary alveolar hemorrhage  acute pulmonary embolism  right ventricular mural thrombus  DVT   ELIAS  transaminitis  lactic acidosis  shock    Code Status: DNAR, I OK    Date of Admission: 8/11/2023   Length of Stay: 4 days        Spiritual Care Screen   Was spiritual care education provided to the patient?   Declined      Nature of the Visit:   Initial, On shift    Crisis Visit:   Preparing to withdraw life support    Referral from/Origin of Request:   [facility] nursing order    Visited with:   Patient and family together    Observations/Symptoms:   Patient laying in bed, intubated, unable to communicate meaningully.  Family bedside, tearful.    Interaction/Conversation:   Family requested prayer.    Assessment:   Need, Distress    Need:   Seeking Spiritual Assistance and Support    Distress:   Grief/Loss/Bereavement    Lutheran & Ritual Needs:   EOL Ritual -- Final Prayer of Commendation, [place]    Intended Effects:   Demonstrate Caring and Concern, Establish Rapport and Connectedness, Shaunna Affirmation, Journeying with Someone in Grief Process    Interventions:   Compassionate Presence, Emotional Support, Prayer, [place]    Outcomes:   Emotional Release, Connectedness with the Holy/with God, Progress with Grief, Spiritual Comfort    Total Time:   10 minutes      Spiritual Care Provider   Chaplain XAVIER Wynn  [phone]   [email or web address]

## 2023-08-15 NOTE — PROGRESS NOTES
Unable to find pedal or post tib pulses on 0400 assessment. Feet more mottled than start of shift. Platelets on morning labs 38. Dr. Parada updated on assessment findings and platelet count.

## 2023-08-15 NOTE — DOCUMENTATION QUERY
[facility]                                                                       Query Response Note      PATIENT:               NIKIA NARVAEZ  ACCT #:                  [id number]  MRN:                     [id number]  :                      1940  ADMIT DATE:       2023 2:21 PM  DISCH DATE:          RESPONDING  PROVIDER #:        [id number]           QUERY TEXT:    It is unclear whether bilateral DVT (deep venous thrombosis) was present on admission. Please clarify the POA status.      The patient's Clinical Indicators include:    83 y.o. F direct admit from [facility]  H&P: Septic shock, PNA, acute resp failure, lactic acidosis  CTA-Chest: Single small pulmonary embolus in a segmental pulmonary artery to RUL.       US RLE-Acute to subacute, occlusive thrombus popliteal, posterior tibial & peroneal veins.  US LLE-Acute to subacute, occlusive thrombus mid & distal superficial femoral, posterior tibial & peroneal veins.    CC PN: CTPE showed small subsegmental PE in RUL found on  CTA; b/l DVT's, RV mural thrombus      CC PN: DVT. Pt has hx of prolong drive (3 hours) to her ranch up in [facility] multiple times    Treatment: Anticoagulation contraindicated d/t diffuse alveolar hemorrhage; IVC filter placed; imaging  Risk Factors: Multiple prolonged drive (3 hours); sepsis; PNA; advanced age    Thank You,  Brittanie Murphy RN  Clinical    Connect via [employer]  Options provided:   -- Bilateral deep venous thrombosis was present at the time of inpatient admission   -- Bilateral deep venous thrombosis was not present at the time of inpatient admission   -- Clinically unable to determine whether bilateral deep venous thrombosis was present on admission   -- Other explanation, (please specify other explanation)   -- Unable to determine      Query created by: Brittanie Murphy  on 8/15/2023 3:37 PM    RESPONSE TEXT:    Bilateral deep venous thrombosis was present at the time of inpatient admission          Electronically signed by:  CELE ISABEL MD 8/15/2023 4:16 PM

## 2023-08-15 NOTE — DISCHARGE SUMMARY
Death Summary    Cause of Death  Cardiac arrest in the setting of comfort care due to multiorgan failure due to cardiogenic shock due to septic shock and pulmonary as embolism and disseminated intravascular coagulation.    Comorbid Conditions at the Time of Death  Principal Problem:    Acute respiratory failure with hypoxia (HCC) (POA: Unknown)  Active Problems:    Shock (HCC) (POA: Yes)    Lactic acidosis (POA: Unknown)    Transaminitis (POA: Unknown)    Acute kidney injury (HCC) (POA: Unknown)    DVT (deep venous thrombosis) (HCC) (POA: Unknown)    RV (right ventricular) mural thrombus (POA: Unknown)    Acute pulmonary embolism (HCC) (POA: Unknown)    Diffuse pulmonary alveolar hemorrhage (POA: Unknown)    Elevated troponin (POA: Unknown)    Encephalopathy acute (POA: Unknown)    DIC (disseminated intravascular coagulation) (HCC) (POA: Unknown)    Goals of care, counseling/discussion (POA: Unknown)  Resolved Problems:    Pneumonia (POA: Unknown)      History of Presenting Illness and Hospital Course  Afsaneh Lyn is a 83 y.o. female who presented as direct admit from [facility] for acute resp failure, intubated. Pt presented 8/11 at OSH for worsening short of breath. Hypoxic in 60-70%, hypotensive. Received 1L fluid bolus. Lactate 21 mg/dl and increased to 87 mg/dl. Unasyn given. Pt was intubated by flight crew before transfer to [facility]     Upon ICU arrival, pt was on the vent FIO2 100%/ PEEP 8. On ketamine gtt. SBP in 100/60s. Quick bedside POCUS with mod-severely depressed LVEF, dilated RV. RV free wall not able to be well visualized. IVC is full >2 cm. Pt later developed profound hypotension in 50-60s/30s, 1L bolus LR, 2 amps of bicarb, total 1000 mcg phenylephrine stick was given. Norepinephrine gtt was started. Epinephrine gtt was started. Left tibial IO was established right away due to lack of access. Right IJ central line was emergently placed. ABG 7.33/33/161     Reportedly,  pt went to ED OSH 2 days prior and was diagnosed with pneumonia and sent home with antibiotics. Pt came back with worsening SOB. At ED OSH, pt was profoundly hypoxic, 60-70%, CXR reportedly had near complete opacification of lungs bilaterally.    Patient was admitted to the ICU on mechanical ventilation on vasopressor and refractory shock, was given broad-spectrum antibiotics and was noted to have significant lung injury on CT scan with a consistent pattern suggestive of diffuse pneumonitis versus ARDS.  Patient underwent bronchoscopy initially which was unremarkable, and then subsequently continued to have significant ARDS and ventilator requirements, and then had a second bronc which was consistent with diffuse alveolar hemorrhage.  Given the concomitant diffuse alveolar hemorrhage as well as dense ELIAS and essentially anuria, consideration was made to vasculitis or pulmonary capillaritis or possible ANCA versus GPA syndrome versus immune mediated syndrome.  That said the patient also had echocardiography showing biventricular function, significant AI, and pulmonary hypertension and was also diagnosed with a right DVT and a pulmonary embolus.    Her clinical picture was refractory to high-dose steroids that was done empirically while awaiting i antibody work-up, following the initiation of high-dose steroids and as well as antibiotics, the patient's condition significantly worsened rather than the expected improvement, and seem to have a hyper inflammatory, multiorgan function dysfunction state which was heralded by progressive disseminated and for intravascular coagulation continued and urea, really dense and refractory ELIAS and persistent hypoxemic respiratory failure and ARDS.  Ultimately the patient's condition worsened to the point of requiring hyper invasive therapies and after several long discussions with her family given her very poor prognosis with multiorgan failure, DIC, ARDS and cardiogenic shock and  valvular disease it was felt that the patient would not be within her values and wishes and goals of care to undergo such hyper invasive care and consequent post intensive care syndrome.  Therefore the patient was transitioned to comfort care and  with her family at the bedside here in the ICU.      Death Date: 08/15/23   Death Time:

## 2023-08-15 NOTE — PROGRESS NOTES
[facility] -  PROGRESS NOTE               Author: Praveen Villagomez M.D. Date & Time: 8/15/2023  10:08 AM     HPI:  Afsaneh Lyn is a 83 y.o. female who presented as direct admit from [facility] for acute resp failure, intubated. Pt presented today at OSH for worsening short of breath. Hypoxic in 60-70%, hypotensive. Received 1L fluid bolus. Lactate 21 mg/dl and increased to 87 mg/dl. Unasyn given.      Reportedly, pt went to ED OSH 2 days prior and was diagnosed with pneumonia and sent home with antibiotics. Pt came back with worsening SOB. At ED OSH, pt was profoundly hypoxic, 60-70%, CXR reportedly had near complete opacification of lungs bilaterally.      Upon ICU arrival, pt was intubated, FIO2 100%/ PEEP 8. On ketamine gtt. SBP in 100/60s. Quick bedside POCUS with mod-severely depressed LVEF, dilated RV. TAPSE not able to be well visualized. IVC is full >2 cm.   Pt later developed profound hypotension in 50-60s/30s, 1L bolus LR, 2 amps of bicarb, total 1000 mcg phenylephrine stick was given. Norepinephrine gtt was started. Epinephrine gtt was started. Left tibial IO was established right away due to lack of access. Right IJ central line was emergently placed. ABG 7.33/33/161     Scr on admission was 1.1 and yael to 1.55 and now 2.15 prompting Nephrology consultation. BUTCH results as below. Patient is currently on vasopressor support.   She also received CT Angio with results below.     BUTCH:  Ascending aortic aneurysm without clear evidence of dissection.   Multiple echogenic densities seen in right ventricle more consistent with  thrombus then vegetation, but cannot rule out vegetation.  Moderate to severe aortic and tricuspid regurgitation.     CT Angio:  1.  Single small pulmonary embolus in a segmental pulmonary artery to the right upper lobe.     2.  Extensive groundglass opacification throughout the lung fields consistent with pneumonitis or pulmonary  "edema.     3.  Small bilateral pleural effusions.     4.  Significant right heart strain.    DAILY NEPHROLOGY SUMMARY:  8/13: ivc filter yesterday. Scr is higher 3.28. Electrolytes are ok. Still anuric. Off vasopressors since this am.   8/14: Scr continues to trend up.  Intubated  8/15: Scr uptrending.  UOP 195cc.  Intubated.      REVIEW OF SYSTEMS:    Unable to assess given patient's clinical status     PMH/PSH/SH/FH:   Reviewed and unchanged since admission note    CURRENT MEDICATIONS:   Reviewed from admission to present day    VS:  /71   Pulse (!) 107   Temp 37.5 °C (99.5 °F) (Bladder)   Resp (!) 27   Ht 1.702 m (5' 7\")   Wt 57 kg (125 lb 10.6 oz)   SpO2 93%   Breastfeeding No   BMI 19.68 kg/m²     Physical Exam  Vitals and nursing note reviewed.     Constitutional:       General: She is not in acute distress.     Appearance: She is ill-appearing and toxic-appearing. She is not diaphoretic.   HENT:      Head: Normocephalic.      Mouth/Throat:      Mouth: Mucous membranes are dry.      Comments: ET tube in place  Cardiovascular:      Rate and Rhythm: Normal rate and regular rhythm.      Pulses: Normal pulses.      Heart sounds: Normal heart sounds. No murmur heard.  Pulmonary:      Effort: No respiratory distress.      Breath sounds: Normal breath sounds. No wheezing, rhonchi or rales.   Abdominal:      General: There is no distension.      Palpations: Abdomen is soft.      Tenderness: There is no abdominal tenderness. There is no guarding.   Musculoskeletal:      Cervical back: Neck supple.      Right lower leg: No edema.      Left lower leg: No edema.   Skin:     Capillary Refill: Capillary refill takes less than 2 seconds.      Coloration: Skin is not jaundiced.      Findings: No bruising or rash.   Neurological:      General: No focal deficit present.     Fluids:  In: 1995.6 [I.V.:967.6; Other:150; Enteral:180]  Out: 195     LABS:  Recent Labs     08/13/23  0334 08/14/23  0355 08/15/23  0222 "   SODIUM 137 136 137   POTASSIUM 3.8 3.9 4.3   CHLORIDE 99 98 99   CO2 19* 18* 18*   GLUCOSE 182* 197* 251*   BUN 68* 107* 139*   CREATININE 3.28* 4.97* 5.97*   CALCIUM 8.8 8.6 7.8*         IMAGING:   All imaging reviewed from admission to present day    IMPRESSION:  # ELIAS with Anuria- multifactorial with Hypoperfusion/Septic Shock, and JUAN  # Acidosis  # Hypotension - off vasopressor support.  # Septic Shock  # Diffuse Pneumonitis  # Single Segmental Pulm Embolism  # Pulmonary Alveolar Hemorrhage  # Intracardiac thrombus - serologies sent  # Acute Respiratory Failure with Hypoxia on Vent support.      PLAN:  - Previously discussed with daughter, patient not a good candidate for dialysis at this time given will not change outcome of respiratory status which is limiting  - If comfort care measures please let nephrology know  - As likely in DIC, high risk of bleeding with renal biopsy thus would hold off at this time  - If full cares are pursued including dialysis by family, please place temp HD catheter and notify nephrology for dialysis  - Daily evaluation for RRT needs  - Dose all meds per eGFR < 15  - Keep MAP >/= to 65  - Monitor IO     Discussed with ICU attending        Total CCM > 35 mins

## 2023-08-15 NOTE — DISCHARGE PLANNING
Care Transition Team Assessment    RNCM completed assessment with information obtained from chart review as patient is intubated and sedated. Patient was a direct admit from [facility] for acute resp failure. GOC meeting was done with family yesterday. Per Dr. Holt he will continue that conversation today as patient is not a good candidate for HD. She is also not a good surgical candidate. HCM will continue to follow to assist with any DC planning needs.     Information Source  Orientation Level: Unable to assess  Information Given By:  (EMR)  Who is responsible for making decisions for patient? : Spouse    Readmission Evaluation  Is this a readmission?: No    Elopement Risk  Legal Hold: No  Ambulatory or Self Mobile in Wheelchair: No-Not an Elopement Risk  Elopement Risk: Not at Risk for Elopement         Discharge Preparedness  What is your plan after discharge?: Uncertain - pending medical team collaboration  What are your discharge supports?: Child, Spouse  Prior Functional Level: Ambulatory, Needs Assist with Activities of Daily Living, Needs Assist with Medication Management    Functional Assesment  Prior Functional Level: Ambulatory, Needs Assist with Activities of Daily Living, Needs Assist with Medication Management    Finances  Financial Barriers to Discharge: No  Prescription Coverage: Yes              Advance Directive  Advance Directive?: None         Psychological Assessment  History of Substance Abuse: None  History of Psychiatric Problems: No  Non-compliant with Treatment: No  Newly Diagnosed Illness: Yes    Discharge Risks or Barriers  Discharge risks or barriers?: Post-acute placement / services, Complex medical needs  Patient risk factors: Cognitive / sensory / physical deficit, Complex medical needs, Vulnerable adult    Anticipated Discharge Information  Discharge Disposition: Discharged to home/self care (01)

## 2023-08-15 NOTE — PROGRESS NOTES
Monitor Summary:     SR/ST:   Ectopy: (F) PVC, bigem, runs of PSVT    .11/.09/.39

## 2023-08-15 NOTE — PROGRESS NOTES
Critical Care Progress Note    Date of admission  8/11/2023    Chief Complaint  Afsaneh Lyn is a 83 y.o. female who presented as direct admit from [facility] for acute resp failure, intubated. Pt presented 8/11 at OSH for worsening short of breath. Hypoxic in 60-70%, hypotensive. Received 1L fluid bolus. Lactate 21 mg/dl and increased to 87 mg/dl. Unasyn given. Pt was intubated by flight crew before transfer to [facility]     Upon ICU arrival, pt was on the vent FIO2 100%/ PEEP 8. On ketamine gtt. SBP in 100/60s. Quick bedside POCUS with mod-severely depressed LVEF, dilated RV. RV free wall not able to be well visualized. IVC is full >2 cm. Pt later developed profound hypotension in 50-60s/30s, 1L bolus LR, 2 amps of bicarb, total 1000 mcg phenylephrine stick was given. Norepinephrine gtt was started. Epinephrine gtt was started. Left tibial IO was established right away due to lack of access. Right IJ central line was emergently placed. ABG 7.33/33/161     Reportedly, pt went to ED OSH 2 days prior and was diagnosed with pneumonia and sent home with antibiotics. Pt came back with worsening SOB. At ED OSH, pt was profoundly hypoxic, 60-70%, CXR reportedly had near complete opacification of lungs bilaterally.    Hospital Course  8/11 direct admit from [facility]  8/11 s/p bronch with evidence of DAH  8/11 started pulse steroid 1000mg IV daily x 3 days  8/11 CTA chest with small PE in RUL. BUTCH with RV thrombus, RV pressure overload. No vegetations  8/12 US LE + DVT  8/12 s/p IVC filter  8/13     Interval Problem Update  Reviewed last 24 hour events:  Worsening multiorgan failure, DIC, poor perfusion  GOC discussion on going  FBG 70s, Plts 30s    Neuro:   Somnolent  EEG negative for sz yesterday    CV:  -110 ST, with PSVT overnight  SBP 120s  Nicard @7.5    Resp:   APV  18/370/8/40%  CXR improved patchy infiltrates    GI: BM x 3    I/O: +1800  UOP: 195    Tmax:  AF  Heme: WBC 20    Abx: Unasyn d 4  S/p azith x 3 days  Procal 43 8/13    Micro: RVP neg  Bronch and Blood cxs, NGTD    Endo: BG rising, ISS    LDA: RIJ CVC, LRAL, ETT, Arizmendi  SUP: H2RA  VTE: Held iso DIC and DAH  Diet: NPO      Review of Systems  Review of Systems   Unable to perform ROS: Critical illness        Vital Signs for last 24 hours   Temp:  [37.4 °C (99.3 °F)-37.8 °C (100 °F)] 37.5 °C (99.5 °F)  Pulse:  [] 107  Resp:  [21-36] 27  BP: (103-151)/(58-98) 114/71  SpO2:  [85 %-100 %] 93 %    Hemodynamic parameters for last 24 hours       Respiratory Information for the last 24 hours  Vent Mode: Spont  Rate (breaths/min): 18  Vt Target (mL): 370  PEEP/CPAP: 8  P Support: 5  MAP: 9.6  Length of Weaning Trial (Hours): 1  Control VTE (exp VT): 492    Physical Exam   Physical Exam  Constitutional:       Appearance: She is ill-appearing.   HENT:      Head: Normocephalic.      Nose: No congestion.      Mouth/Throat:      Pharynx: No oropharyngeal exudate.   Eyes:      Pupils: Pupils are equal, round, and reactive to light.   Cardiovascular:      Rate and Rhythm: Regular rhythm. Tachycardia present.   Pulmonary:      Breath sounds: Rhonchi present. No wheezing.   Abdominal:      General: Abdomen is flat.      Palpations: Abdomen is soft.   Musculoskeletal:      Right lower leg: Edema present.      Left lower leg: Edema present.   Skin:     Comments: Cold mottled feet, delayed refill     Neurological:      Comments: Minimal response to noxious stim  No hyperreflexia or clonus         Medications  Current Facility-Administered Medications   Medication Dose Route Frequency Provider Last Rate Last Admin    labetalol (Normodyne/Trandate) injection 10 mg  10 mg Intravenous Q4HRS PRN Graeme Holt M.D.   10 mg at 08/14/23 0902    niCARdipine (Cardene) 25 mg in  mL Standard Infusion  0-15 mg/hr Intravenous Continuous Graeme Holt M.D. 75 mL/hr at 08/15/23 0643 7.5 mg/hr at 08/15/23 0643     ampicillin/sulbactam (Unasyn) 3 g in  mL IVPB  3 g Intravenous Q EVENING Mustapha Jackson D.O.   Stopped at 08/14/23 1828    famotidine (Pepcid) tablet 20 mg  20 mg Enteral Tube DAILY Mustapha Jackson D.O.   20 mg at 08/15/23 0508    Or    famotidine (Pepcid) injection 20 mg  20 mg Intravenous DAILY Mustapha Jackson D.O.        insulin regular (HumuLIN R,NovoLIN R) injection  2-9 Units Subcutaneous Q6HRS CHRISTY PriceO.   3 Units at 08/15/23 0504    And    dextrose 50% (D50W) injection 25 g  25 g Intravenous Q15 MIN PRN Mustapha Jackson D.O.        Pharmacy Consult: Enteral tube insertion - review meds/change route/product selection  1 Each Other PHARMACY TO DOSE Mustapha Jackson D.O.        ondansetron (Zofran) syringe/vial injection 4 mg  4 mg Intravenous Q4HRS PRN Liliam Gates M.D.        Respiratory Therapy Consult   Nebulization Continuous RT Liliam Gates M.D.        senna-docusate (Pericolace Or Senokot S) 8.6-50 MG per tablet 2 Tablet  2 Tablet Enteral Tube BID Liliam Gates M.D.   2 Tablet at 08/15/23 0508    And    polyethylene glycol/lytes (Miralax) PACKET 1 Packet  1 Packet Enteral Tube QDAY PRN Liliam Gates M.D.   1 Packet at 08/13/23 0506    And    magnesium hydroxide (Milk Of Magnesia) suspension 30 mL  30 mL Enteral Tube QDAY PRN Liliam Gates M.D.   30 mL at 08/14/23 0504    And    bisacodyl (Dulcolax) suppository 10 mg  10 mg Rectal QDAY PRN Liliam Gates M.D.   10 mg at 08/15/23 0508    lidocaine (Xylocaine) 1 % injection 2 mL  2 mL Tracheal Tube Q30 MIN PRN Liliam Gates M.D.        fentaNYL (Sublimaze) injection 100 mcg  100 mcg Intravenous Q15 MIN PRN Liliam Gates M.D.   100 mcg at 08/14/23 0234    And    fentaNYL (Sublimaze) injection 200 mcg  200 mcg Intravenous Q15 MIN PRN Liliam Gates M.D.        ipratropium-albuterol (DUONEB) nebulizer solution  3 mL Nebulization Q4H PRN (RT) Liliam WRIGHT  Jluis Gates M.D.        acetaminophen (Tylenol) tablet 650 mg  650 mg Enteral Tube Q6HRS PRN Liliam Gates M.D.        ondansetron (Zofran ODT) dispertab 4 mg  4 mg Enteral Tube Q4HRS PRN Liliam Gates M.D.           Fluids    Intake/Output Summary (Last 24 hours) at 8/15/2023 0840  Last data filed at 8/15/2023 0800  Gross per 24 hour   Intake 2025.62 ml   Output 185 ml   Net 1840.62 ml       Laboratory  Recent Labs     08/13/23  0248 08/14/23  1325   ISTATAPH 7.417 7.468   ISTATAPCO2 30.0 28.2   ISTATAPO2 62* 63*   ISTATATCO2 20 21   [id number] 92* 94   ISTATARTHCO3 19.3 20.5   ISTATARTBE -4 -3   ISTATTEMP 37.2 C 37.3 C   ISTATFIO2 40 50   ISTATSPEC Arterial Arterial   ISTATAPHTC 7.414 7.464   [id number] 63* 65         Recent Labs     08/13/23  0334 08/14/23  0355 08/15/23  0222   SODIUM 137 136 137   POTASSIUM 3.8 3.9 4.3   CHLORIDE 99 98 99   CO2 19* 18* 18*   BUN 68* 107* 139*   CREATININE 3.28* 4.97* 5.97*   MAGNESIUM 3.1* 3.3*  --    PHOSPHORUS 5.3*  --   --    CALCIUM 8.8 8.6 7.8*     Recent Labs     08/13/23 0334 08/14/23 0355 08/14/23 0814 08/15/23  0222   ALTSGPT 191* 212*  --  195*   ASTSGOT 124* 114*  --  81*   ALKPHOSPHAT 161* 176*  --  198*   TBILIRUBIN 0.4 0.5  --  0.6   PREALBUMIN  --   --  14.7*  --    GLUCOSE 182* 197*  --  251*     Recent Labs     08/13/23  0334 08/14/23 0355 08/15/23  0222   WBC 19.1*  --  20.4*   NEUTSPOLYS 93.40*  --  95.80*   LYMPHOCYTES 2.40*  --  0.00*   MONOCYTES 3.20  --  2.50   EOSINOPHILS 0.00  --  0.00   BASOPHILS 0.10  --  0.00   ASTSGOT 124* 114* 81*   ALTSGPT 191* 212* 195*   ALKPHOSPHAT 161* 176* 198*   TBILIRUBIN 0.4 0.5 0.6     Recent Labs     08/12/23  1056 08/13/23  0334 08/14/23  0909 08/15/23  0222   RBC  --  3.17*  --  2.83*   HEMOGLOBIN  --  10.1*  --  8.6*   HEMATOCRIT  --  29.8*  --  26.0*   PLATELETCT  --  82*  --  38*   PROTHROMBTM 19.1*  --  20.0* 20.2*   APTT  --   --  37.0*  --    INR 1.65*  --  1.75* 1.77*        Imaging  X-Ray:  I have personally reviewed the images and compared with prior images.    Assessment/Plan  * Acute respiratory failure with hypoxia (HCC)  Assessment & Plan  8/11 Intubated by EMS flight crew at OSH. Reportedly pt was in 60% oxygen sat, breathing 40s/min.   Pt was intubated with ET tube #6. No issues en route.   Reportedly CXR showed near complete opacification in bilateral lungs. CD from OSH was being uploaded to [facility]    At [facility], 8/11 S/p ET tube exchange from #6 to #7.5  8/11 CT chest with extensive diffuse pneumonitis.   8/11 Repeat bronch with evidence of bronchoalveolar hemorrhage.   8/11 started on pulse steroids 1000mg IV daily x 3 days  8/14 Worsening hypoxemia and pulmonary infiltrates with stable Hb, suspect pulmonary edema from AI, cardiogenic shock. Add afterload reduction      Overall FIO2/PEEP are improving with good lung compliance. Liberate Tv to 6/kg.    Plan:   Sedation with precedex and fentanyl. Daily SAT/SBT if able  Goal sat >90%, pplat <30  Autoimmune work up: MARISEL, APL labs, ANCA, Anti GBM negative so far  Infectious w/u unrevealing to date, NGTD in bronch or BCxs  Follow up BAL cultures.   Nephrology was consulted for concern of rising creatinine and anuric. May need dialysis    Encephalopathy acute  Assessment & Plan  Multifactorial given pt's shock, ELIAS, multiorgan failure  Rule out stroke given her high clot burden  MRI brain w/o contrast pending  EEG spot today    Elevated troponin  Assessment & Plan  Trop in 100-200s. Recent troponin in 1990-3525  ECG with TWI in septal leads with ?mild ST elevation inferiorly. Repeat ECG with no change  Has evidence of significant AI and elevated BiV filling pressures  Pt won't be candidate for heparin gtt or aspirin  Suspect demand ischemia  Continue serial troponins  Suspect underlying cardiac disease, likely need left heart cath when pt is more stable.   Cardiology       Diffuse pulmonary alveolar hemorrhage  Assessment &  Plan  DAH noted on bronch.   CTA chest with small PE in RUL but extensive pneumonitis  Autoimmune work up pending (MARISEL, ANCA, GBM, APL) negative to date  Solumedrol 1000mg IV daily x 3 days, then wean   Creatinine now rising (normal at admission), this is confounded by her shock. Could be ATN  Initial UA with trace occult blood, hyaline cast. Repeat UA with large RBC  May need kidney biopsy  Nephrology was consulted      Acute pulmonary embolism (HCC)  Assessment & Plan  Small segmental PE in RUL noted on CTA.   US LE + extensive bilateral DVT  Anticoagulation contraindicated due to DAH  8/12 s/p IVC filter.     RV (right ventricular) mural thrombus  Assessment & Plan  RV thrombus noted in TTE and BUTCH.   No vegetation valve.   APL diagnosis was entertained. Pending APL labs  No known hx of APL in the family.   Anticoagulation contraindicated due to DAH    DVT (deep venous thrombosis) ([facility])  Assessment & Plan  Pt has hx of prolong drive (3 hours) to her ranch up in [place]/[facility] multiple times  US LE positive for extensive DVTs bilaterally  Anticoagulation contraindicated due to diffuse alveolar hemorrhage.    8/12 s/p IVC filter    Acute kidney injury (HCC)  Assessment & Plan  Continue Oliguria  Etiology  - multifactorial. DDx include ATN from profound shock vs preexisting pulm renal syndrome vs drug induced  Monitor UOP  Monitor electrolytes, acid-base disturbances.   Nephrology following    Transaminitis  Assessment & Plan  Likely related to shock. Monitor.   Total bilirubin normal    Lactic acidosis  Assessment & Plan  Initial lactate 9.6 mmol/L, now normalized post resuscitation and with supportive care  Serial lactates until <2      Shock ([facility])- (present on admission)  Assessment & Plan    Shock is likely combination of cardiogenic/septic?/distributive from PE given RV pressure overload though clot burden was fairly low considering the small segmental PE on CTA.   Also with mod/severe AI and hypertensive  with elvated BiV filling pressures estimated on TTE, arguing for cardiogenic component.   LVEF 40-45% with evidence of RV pressure load.   8/12 s/p IVC filter  8/14  Vasopressors off  Unable to anticoagulate due to DAH  Start nicardipine to keep SBP <140 as afterload reduction    Serial lactates. Lactates improving.  Follow up blood cultures.  Monitor UOP, goal >30cc/hr.       Goals of care, counseling/discussion  Assessment & Plan  Updated family daily. Explained pt's critical condition. All questions were answered.          VTE:  Contraindicated  Ulcer: H2 Antagonist  Lines: Central Line  Ongoing indication addressed, Arterial Line  Ongoing indication addressed, and Arizmendi Catheter  Ongoing indication addressed    I have performed a physical exam and reviewed and updated ROS and Plan today (8/15/2023). In review of yesterday's note (8/14/2023), there are no changes except as documented above.     Discussed patient condition and risk of morbidity and/or mortality with Family, RN, RT, Therapies, Pharmacy, and nephrology  The patient remains critically ill.  Critical care time = 85 minutes in directly providing and coordinating critical care and extensive data review.  No time overlap and excludes procedures.

## 2023-08-16 LAB
BACTERIA BLD CULT: NORMAL
BACTERIA BLD CULT: NORMAL
SIGNIFICANT IND 70042: NORMAL
SIGNIFICANT IND 70042: NORMAL
SITE SITE: NORMAL
SITE SITE: NORMAL
SOURCE SOURCE: NORMAL
SOURCE SOURCE: NORMAL

## 2023-08-16 NOTE — PROGRESS NOTES
[facility] Progress Note      Admit Date: 8/11/2023    Resident(s): Lisa Montalvo M.D.   Attending:  PEDRITO CAVANAUGH/ Dr. Holt    Patient ID:    Name:  Afsaneh Lyn   YOB: 1940  Age:  83 y.o.  female   MRN:  [id number]    Hospital Course (carried forward and updated):  Afsaneh Lyn is a 83 y.o. female who presented as direct admit from [facility] for acute resp failure, intubated. Pt presented 8/11 at OSH for worsening short of breath. Reportedly, pt went to ED OSH 2 days prior and was diagnosed with pneumonia and sent home with antibiotics. At ED OSH, pt was profoundly hypoxic, 60-70%, CXR reportedly had near complete opacification of lungs bilaterally, hypotensive. Started on Unasyn.     Upon ICU arrival, pt was re intubated (ET tube changed), FIO2 100%/ PEEP 8. SBP in 100/60s. Quick bedside POCUS with mod-severely depressed LVEF, dilated RV. TAPSE not able to be well visualized. IVC is full >2 cm. Pt later developed profound hypotension in 50-60s/30s, 1L bolus LR, 2 amps of bicarb, total 1000 mcg phenylephrine stick was given. Norepinephrine gtt was started. Epinephrine gtt was started. Left tibial IO was established right away due to lack of access. Right IJ central line was emergently placed. CTA chest with small PE in RUL. BUTCH with RV thrombus, RV pressure overload. No vegetations. s/p bronch with evidence of diffuse alveolar hemorrhage (8/11). US LE + DVT s/p IVC filter (8/12). Persistent hypoxemia. Complicated course by DIC and suspected brain injury.  Kideny function worsening, HD discussed with family. Overall poor outcome, GOC discussion with family, who opted for comfort care.     Consultants:  Critical Care  IR   Nephrology  Cardiology    Interval Events:  8/15: End-organ damage markers worsening, worse coags and hypoxemia. GOC discussion per Dr. Holt given overall very poor prognosis. Pt transitioned to comfort care in PM.     8/14: Trops trending  up. PO2 63. Poor perfusion, BP 160s/90s.   8/13: worsening ELIAS 2/2 ATN 2/2 shock. Uptrending trops --> cardio consult BAL: NGTD. Pending autoimmune studies. MRI brain to r/o stroke in setting of hypoxia for 2h.  8/12:  BUTCH: AAA, Thrombus in RV, severe AR and TR. Pressors off; sedated. Hepatitis panel: negative.  8/11: admitted from [facility] for AHRF requiring intubation and pressors    NEURO:   Ill-appearing, does not withdraw to pain  CARDIOVASC:  BP 120s/70s, SR 100s. Trop 2K. Poor perfusion, lack of pulses (pedis), cold, cyanotic, delayed capillary refill.   RESPIRATORY:  VD #5 APV per RT  GI/NUTRITION:  AST/ALT: 114/212, TB 0.5, NGT  RENAL/FLUID/LYTES: BUN/Cr: 107/4.97. UA: granular casts.  HEME/ONC:   WBC: 20K, Hgb: 8.6, plt: 38. Fibrinogen 93, INR 1.75, aPTT 37, D dimer elevated.   INFECTIOUS D:  Unasyn. Procal: 6.35 --> 43.4, CRP: 25.57  ENDOCRINE:   Glu: 251    Vitals Range last 24h:  Temp:  [37.4 °C (99.3 °F)-37.8 °C (100 °F)] 37.5 °C (99.5 °F)  Pulse:  [] 92  Resp:  [23-45] 45  BP: (103-128)/(58-81) 120/72  SpO2:  [41 %-100 %] 92 %      Intake/Output Summary (Last 24 hours) at 8/15/2023 1705  Last data filed at 8/15/2023 1200  Gross per 24 hour   Intake 1699.94 ml   Output 105 ml   Net 1594.94 ml          Review of Systems   Unable to perform ROS: Intubated       PHYSICAL EXAM:  Vitals:    08/15/23 1315 08/15/23 1330 08/15/23 1345 08/15/23 1440   BP: 120/72      Pulse: (!) 105 (!) 101 (!) 105 92   Resp: (!) 26 (!) 27 (!) 24 (!) 45   Temp:       TempSrc:       SpO2: 92% 93% 93% 92%   Weight:       Height:        Body mass index is 19.68 kg/m².    O2 therapy: Pulse Oximetry: 92 %, O2 Delivery Device: Ventilator    Date 08/15/23 0700 - 08/16/23 0659   Shift 6052-4034 5072-6311 5163-1883 24 Hour Total   INTAKE   P.O. 0   0     P.O. 0   0   NG/   150     Intake (mL) (Enteral Tube 08/11/23 Orogastric 16 Fr. Oral) 150   150   Enteral 60   60     Free Water / Tube Flush 60   60   Shift Total 210    210   OUTPUT   Urine 0   0     Output (mL) (Urethral Catheter Temperature probe 16 Fr.) 0   0   Stool         Number of Times Stooled 1 x   1 x   Shift Total 0   0      210          Physical Exam  Vitals and nursing note reviewed.   Constitutional:       Appearance: She is normal weight. She is ill-appearing and toxic-appearing.   HENT:      Head: Normocephalic and atraumatic.      Nose: Nose normal.      Comments: NGT     Mouth/Throat:      Mouth: Mucous membranes are dry.      Comments: ETT  Cardiovascular:      Rate and Rhythm: Normal rate and regular rhythm.      Pulses: Normal pulses.      Comments: Lack of pulses in feet, which are cold and cyanotic  Pulmonary:      Breath sounds: Rales present.   Chest:      Chest wall: No tenderness.   Abdominal:      General: Bowel sounds are normal. There is no distension.      Palpations: Abdomen is soft.      Tenderness: There is no abdominal tenderness. There is no guarding.   Musculoskeletal:         General: No deformity.      Cervical back: Neck supple.      Right lower leg: No edema.      Left lower leg: No edema.      Comments: SCDs   Skin:     General: Skin is dry.      Capillary Refill: Capillary refill takes more than 3 seconds.   Neurological:      Cranial Nerves: No cranial nerve deficit.      Sensory: No sensory deficit.      Comments: Poor responsiveness, does not withdraw to pain, pupils are reactive.          Recent Labs     08/13/23  0248 08/14/23  1325   ISTATAPH 7.417 7.468   ISTATAPCO2 30.0 28.2   ISTATAPO2 62* 63*   ISTATATCO2 20 21   TIBMTUY6THO 92* 94   ISTATARTHCO3 19.3 20.5   ISTATARTBE -4 -3   ISTATTEMP 37.2 C 37.3 C   ISTATFIO2 40 50   ISTATSPEC Arterial Arterial   ISTATAPHTC 7.414 7.464   KNMNDBHT5FO 63* 65       Recent Labs     08/13/23  0334 08/14/23  0355 08/15/23  0222   SODIUM 137 136 137   POTASSIUM 3.8 3.9 4.3   CHLORIDE 99 98 99   CO2 19* 18* 18*   BUN 68* 107* 139*   CREATININE 3.28* 4.97* 5.97*   MAGNESIUM 3.1* 3.3*  --     PHOSPHORUS 5.3*  --   --    CALCIUM 8.8 8.6 7.8*       Recent Labs     08/13/23  0334 08/14/23  0355 08/14/23  0814 08/15/23  0222   ALTSGPT 191* 212*  --  195*   ASTSGOT 124* 114*  --  81*   ALKPHOSPHAT 161* 176*  --  198*   TBILIRUBIN 0.4 0.5  --  0.6   PREALBUMIN  --   --  14.7*  --    GLUCOSE 182* 197*  --  251*       Recent Labs     08/13/23  0334 08/14/23  0909 08/15/23  0222 08/15/23  0805   RBC 3.17*  --  2.83*  --    HEMOGLOBIN 10.1*  --  8.6*  --    HEMATOCRIT 29.8*  --  26.0*  --    PLATELETCT 82*  --  38*  --    PROTHROMBTM  --  20.0* 20.2* 20.2*   APTT  --  37.0*  --  32.7   INR  --  1.75* 1.77* 1.77*       Recent Labs     08/13/23  0334 08/14/23  0355 08/15/23  0222   WBC 19.1*  --  20.4*   NEUTSPOLYS 93.40*  --  95.80*   LYMPHOCYTES 2.40*  --  0.00*   MONOCYTES 3.20  --  2.50   EOSINOPHILS 0.00  --  0.00   BASOPHILS 0.10  --  0.00   ASTSGOT 124* 114* 81*   ALTSGPT 191* 212* 195*   ALKPHOSPHAT 161* 176* 198*   TBILIRUBIN 0.4 0.5 0.6         Meds:   MD ALERT...adult comfort care        morphine injection  5 mg      morphine injection  10 mg      LORazepam  1 mg      Or    LORazepam  1 mg          Procedures:  8/12: IVC filter, Bronchoscopy  8/11: BUTCH, art line, bronchoscopy, reintubation, IO, R IJ    Imaging:  DX-CHEST-PORTABLE (1 VIEW)   Final Result         1.  Pulmonary edema and/or infiltrates are identified, which are somewhat decreased since the prior exam.      DX-CHEST-PORTABLE (1 VIEW)   Final Result      1.  Satisfactory support lines and tubes.   2.  Bilateral interstitial opacities could be related to pulmonary edema. There is developing airspace opacity in the right lung base, edema versus atelectasis and/or developing infection..         IR-INSERT IVC FILTER WITH IG & SI   Final Result      1. Ultrasound and fluoroscopic guided placement of an Argon Option retrievable IVC filter.      2. Inferior vena cavagram within normal limits with no evidence of caval thrombus or occlusion.       US-EXTREMITY VENOUS LOWER BILAT   Final Result      EC-BUTCH W/O CONT   Final Result      CT-CTA CHEST PULMONARY ARTERY W/ RECONS   Final Result      1.  Single small pulmonary embolus in a segmental pulmonary artery to the right upper lobe.      2.  Extensive groundglass opacification throughout the lung fields consistent with pneumonitis or pulmonary edema.      3.  Small bilateral pleural effusions.      4.  Significant right heart strain.            EC-ECHOCARDIOGRAM COMPLETE W/ CONT   Final Result      OUTSIDE IMAGES-DX CHEST   Final Result      DX-CHEST-PORTABLE (1 VIEW)   Final Result      1.  Satisfactory support lines and tubes.   2.  No significant interval change.      DX-CHEST-PORTABLE (1 VIEW)   Final Result      1.  Mild interstitial pulmonary edema.   2.  Interval placement of an endotracheal tube which terminates in satisfactory position at the level of the aortic arch.   3.  Interval insertion of a central venous catheter which terminates with the tip projecting over the expected region of the mid to distal superior vena cava.   4.  Interval placement of an enteric feeding tube which courses towards the stomach with the distal tip not visualized.      DX-ABDOMEN FOR TUBE PLACEMENT   Final Result      Enteric feeding tube tip terminates in the left abdomen over the expected location of the stomach.      MR-BRAIN-W/O    (Results Pending)       ASSESSEMENT and PLAN:    * Acute respiratory failure with hypoxia (HCC)  Assessment & Plan  Likely multifactorial: diffuse pneumonitis - severe alveolar hemorrhage, + PE + HF + infectious process  -8/11 Intubated by EMS flight crew at OSH. S/p ET tube exchange from #6 to #7.5  -8/11 CT chest with extensive diffuse pneumonitis.   -8/11 Repeat bronch with evidence of bronchoalveolar hemorrhage.   -8/11 started on pulse steroids 1000mg IV daily x 3 days, completed with non significant improvement   -Sedation with Fentanyl PRN  -Goal sat >90%, pplat <30  -Negative  viral panel and respiratory cultures  -Based on GOC discussion (per Dr. Holt), patient transitioned to comfort care    Elevated troponin  Assessment & Plan  -Likely due to demand ischemia. ECG with TWI in septal leads. Repeat ECG with no change. TTE and BUTCH not showing abnormal wall motion. However, troponins uptrending to 2K, cannot rule out type 1 ischemia, but patient is not a candidate for further w/u for now.   -Based on GOC discussion (per Dr. Holt), patient transitioned to comfort care    Diffuse pulmonary alveolar hemorrhage  Assessment & Plan  -DAH noted on bronch 8/12  -CTA chest with small PE in RUL but extensive pneumonitis  - Ddx includes: CHF, ARDS, coagulopathy (increased INR and decreased fibrinogen) and, of course, autoimmune (work up pending (MARISEL, ANCA, GBM, APL) - in the setting of ELIAS, possible anti-GBM  -Persistent hypoxemia and coagulopathy  -Based on GOC discussion (per Dr. Holt), patient transitioned to comfort care    Acute pulmonary embolism (HCC)  Assessment & Plan  -Small segmental PE in RUL noted on CTA   -US LE + extensive bilateral DVT  -Anticoagulation contraindicated due to DAH  -8/12 s/p IVC filter + SCDs  -Based on GOC discussion (per Dr. Holt), patient transitioned to comfort care given very poor overall prognosis     RV (right ventricular) mural thrombus  Assessment & Plan  -RV thrombus noted in TTE and BUTCH; No vegetation valve. Procoagulable state suspected, now DIC.  -Anticoagulation contraindicated due to DAH --> IVC filter placed 8/12  -Based on GOC discussion (per Dr. Holt), patient transitioned to comfort care given very poor overall prognosi    Dissseminated Intravascular Coagulopathy  Assessment & Plan  -5 criteria. Suspected proinflammatory state now with multiorgan failure. Worsens prognosis.   -Based on GOC discussion (per Dr. Holt), patient transitioned to comfort care given very poor overall prognosis    Shock (HCC)- (present on  admission)  Assessment & Plan  2/2 likely cardiogenic +/- septic +/- distributive  -Pt remained poorly perfused, suspect brain injury  -Based on GOC discussion (per Dr. Holt), patient transitioned to comfort care given very poor overall prognosis    DVT (deep venous thrombosis) (HCC)  Assessment & Plan  -Procoagulable/inflammatory status suspected at base   -US LE positive for extensive DVTs bilaterally  -Anticoagulation contraindicated due to diffuse alveolar hemorrhage.    -8/12 s/p IVC filter  -Based on GOC discussion (per Dr. Holt), patient transitioned to comfort care given very poor overall prognosis    Congestive heart failure   Assessment & Plan  -No history of HF. New onset decrease LVEF 40% and right-sded strain in the setting of PE. Severe AI/AR. Clinical picture compatible with cardiogenic shock.   - Started nicardipine infusion to decrease afterload, stop today   -Based on GOC discussion (per Dr. Holt), patient transitioned to comfort care given very poor overall prognosis    Acute kidney injury (HCC)  Assessment & Plan  -oliguric,  Likely in the setting of multifactorial: ATN 2/2 profound shock vs preexisting pulm-renal syndrome vs drug induced  -RRT was discussed with family, risks and benefits, they did not opt for pursing HD   -Based on GOC discussion (per Dr. Holt), patient transitioned to comfort care given very poor overall prognosis        DISPO: Transitioned to comfort care     CODE STATUS: Transitioned to comfort care     Quality Measures:  Transitioned to comfort care       Lisa Montalvo M.D.

## 2023-08-16 NOTE — DOCUMENTATION QUERY
"                                                                         [facility]                                                                       Query Response Note      PATIENT:               NIKIA NARVAEZ  ACCT #:                  [id number]  MRN:                     [id number]  :                      1940  ADMIT DATE:       2023 2:21 PM  DISCH DATE:        8/15/2023 4:15 PM  RESPONDING  PROVIDER #:        [id number]           QUERY TEXT:    \"Congestive heart failure, no history of HF, new onset LVEF 40%\"  is documented in the Critical Care Notes beginning .    Can this documentation be further specified?    The patient's Clinical Indicators include:     ECHO: EF 40%. Flattened septum in systole & diastole consistent w/ RV pressure & volume overload.   BUTCH: Multiple irregular echogenic densities in Rt. ventricle that most consistent w/ thrombus.  CXR: Mild interstitial pulmonary edema.     CC PN:   - No history of HF. New onset decrease LVEF 40% and right-sided strain in the setting of PE.   - Severe AI/AR. Clinical picture compatible with cardiogenic shock.  - Start nicardipine infusion to decrease afterload    Treatment: ECHO; BUTCH; Nicardipine; CXR; IR Cardiology Consult; tx underlying conditions  Risk Factors: Cardiomyopathy; HTN; PE; sepsis; shock; PNA; RV Thrombus; acute resp failure    Thank You,  Brittanie Murphy RN  Clinical    Connect via [employer]  Options provided:   -- Acute systolic congestive heart failure, present on admission   -- Acute systolic congestive heart failure, not present on admission   -- Systolic congestive heart failure new onset, not acute, present on admission   -- Systolic congestive heart failure new onset, not acute, not present on admission   -- Other explanation, (please specify other explanation)   -- Unable to determine      Query created by: Brittanie Murphy on 2023 2:13 PM    RESPONSE TEXT:    Acute systolic " congestive heart failure, present on admission          Electronically signed by:  CELE ISABEL MD 8/16/2023 4:32 PM

## 2023-08-17 LAB
APTT IMM NP PPP: 57 SEC (ref 32–48)
C IMMITIS AB SER QL ID: NOT DETECTED
COCCIDIOIDES AB TITR SER CF: NORMAL {TITER}
COCCIDIOIDES IGG SER-ACNC: 0.1 IV
COCCIDIOIDES IGM SERPL-ACNC: 0.1 IV
CONFIRM APTT STACLOT: POSITIVE
DRVVT SCREEN TO CONFIRM RATIO: NEGATIVE {RATIO}
LA 3 SCREEN W REFLEX-IMP: ABNORMAL
LA NT PLATELET PPP: NEGATIVE S
MIXING DRVVT: 48 SEC (ref 33–44)
PROTHROMBIN TIME: 21.2 SEC (ref 12–15.5)
PT P HEP NEUT PPP: 68 SEC (ref 32–48)
REPTILASE TIME: 18.7 SEC
SCREEN APTT: 68 SEC (ref 32–48)
SCREEN DRVVT: 67 SEC (ref 33–44)
THROMBIN TIME: 21.5 SEC (ref 14.7–19.5)

## 2023-08-18 LAB — TEST NAME 95000: NORMAL

## 2023-08-19 LAB
A FUMIGATUS1 AB SER QL ID: NORMAL
A FUMIGATUS6 AB SER QL ID: NORMAL
A PULLULANS AB SER QL ID: NORMAL
PIGEON SERUM AB QL ID: NORMAL
S RECTIVIRGULA AB SER QL ID: NORMAL

## 2023-08-22 LAB — TEST NAME 95000: ABNORMAL

## 2023-09-12 LAB
FUNGUS SPEC CULT: NORMAL
FUNGUS SPEC FUNGUS STN: NORMAL
SIGNIFICANT IND 70042: NORMAL
SITE SITE: NORMAL
SOURCE SOURCE: NORMAL

## 2023-09-23 LAB
MYCOBACTERIUM SPEC CULT: NORMAL
RHODAMINE-AURAMINE STN SPEC: NORMAL
SIGNIFICANT IND 70042: NORMAL
SITE SITE: NORMAL
SOURCE SOURCE: NORMAL

## 2024-05-03 NOTE — PROGRESS NOTES
"Provider Encounter- Full Thickness wound    HISTORY OF PRESENT ILLNESS   START OF CARE IN CLINIC:4/24/2019  REFERRING PROVIDER: KEITH Kim     WOUND- Full thickness wound  LOCATION: Right Anterior Shin     WOUND HISTORY:   Patient is a 79 year old female with a right anterior shin wound. 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 The patient reports that,, \"they did not clean margins, so they went back again\".  Presumably all of the cancer was removed, but pathology reports that sent over from dermatology.  Since the biopsy, the shin wound has not healed.   The patient reports that she had three wound cultures which all showed yeast in the wound, but no bacteria.  Nonetheless,  she was given a couple of shots of Rocephin, after which her wound improved. She was treated for the yeast with Nystatin powder by her dermatologist.      PERTINENT PMH: Squamous Cell Carcinoma      IMAGING:None  VASCULAR STUDIES: None               LAST  WOUND CULTURE: 4/24/19     RESULTS:  Heavy growth staph intermedius                                         DIABETES: NO  MOST RECENT A1C: N/A     TOBACCO USE: NEVER SMOKER        5/6: Initial provider assessment.  Patient states she was seen by Dr. Johnson with severe infectious diseases last week, Dr. Johnson did not want to prescribe antibiotics.  An MRI was also ordered by Dr. Johnson to rule out osteomyelitis, and arterial studies to assess lower extremity perfusion.  Patient is scheduled to return to ID in the next week or 2.  Periwound irritation noted, patient states she used a regular Band-Aid over the wound, and this irritated her skin.  Wound debrided in clinic.          PAST MEDICAL HISTORY:   Past Medical History:   Diagnosis Date   • Diverticulosis    • Hypothyroid    • IBS (irritable bowel syndrome)    • Migraines    • Mitral valve prolapse        PAST SURGICAL HISTORY:   Past Surgical History:   Procedure " Laterality Date   • ABDOMINAL HYSTERECTOMY TOTAL      Hysterectomy, Total Abdominal   • PB ENLARGE BREAST WITH IMPLANT     • US-NEEDLE CORE BX-BREAST PANEL          MEDICATIONS:   Current Outpatient Prescriptions   Medication   • FLUoxetine (PROZAC) 20 MG Cap   • levothyroxine (SYNTHROID) 150 MCG Tab   • estradiol (CLIMARA) 0.025 MG/24HR PATCH WEEKLY   • Aspirin-Acetaminophen-Caffeine (EXCEDRIN PO)     No current facility-administered medications for this visit.        ALLERGIES:    Allergies   Allergen Reactions   • Macrobid [Nitrofurantoin Monohydrate Macrocrystals] Nausea     Cramp in legs         SOCIAL HISTORY:   Social History     Social History   • Marital status: Single     Spouse name: N/A   • Number of children: N/A   • Years of education: N/A     Social History Main Topics   • Smoking status: Never Smoker   • Smokeless tobacco: Never Used   • Alcohol use 0.6 oz/week     1 Glasses of wine per week      Comment: wine daily   • Drug use: No   • Sexual activity: Not on file      Comment: , two daughters,      Other Topics Concern   • Not on file     Social History Narrative   • No narrative on file       FAMILY HISTORY: No family history on file.     REVIEW OF SYSTEMS:   Review of Systems   Constitutional: Negative for chills and fever.   Respiratory: Negative for cough and shortness of breath.    Cardiovascular: Positive for leg swelling. Negative for chest pain and claudication.   Gastrointestinal: Negative for constipation, diarrhea, nausea and vomiting.   Genitourinary: Negative for dysuria.   Musculoskeletal: Negative for joint pain.   Psychiatric/Behavioral: Negative for depression.       PHYSICAL EXAMINATION:   /79   Pulse 92   Temp 37.1 °C (98.7 °F)   Resp 18   SpO2 96%   Physical Exam   Constitutional: She is oriented to person, place, and time and well-developed, well-nourished, and in no distress.   Thin, undernourished   HENT:   Head: Normocephalic.   Eyes: Pupils  are equal, round, and reactive to light.   Cardiovascular: Intact distal pulses.    Strong, palpable pedal pulses   Pulmonary/Chest: Effort normal.   Musculoskeletal: Normal range of motion.   Nonpitting edema of right lower extremity   Neurological: She is alert and oriented to person, place, and time.   Skin: Skin is warm.   Open wound to anterior right shin  Refer to flowsheet and photos   Psychiatric: Mood, memory, affect and judgment normal.       Wound Assessment  Wound 04/24/19 Full Thickness Wound Leg -Right Anterior Shin (Active)   Wound Image    5/6/2019  4:00 PM   Site Assessment Red;Yellow 5/6/2019  4:00 PM   Carolyn-wound Assessment Blanchable erythema;Excoriated;Fragile;Red;Other (Comment) 5/6/2019  4:00 PM   Margins Attached edges 5/6/2019  4:00 PM   Wound Length (cm) 0.6 cm 5/6/2019  4:00 PM   Wound Width (cm) 0.4 cm 5/6/2019  4:00 PM   Wound Depth (cm) 0.3 cm 5/6/2019  4:00 PM   Wound Surface Area (cm^2) 0.24 cm^2 5/6/2019  4:00 PM   Post Wound Length (cm) 0.6 cm 5/6/2019  4:00 PM    Post Wound Width (cm) 0.5 cm 5/6/2019  4:00 PM   Post Wound Depth (cm) 0.3 cm 5/6/2019  4:00 PM   Post Wound Surface Area (cm^2) 0.3 cm^2 5/6/2019  4:00 PM   Drainage Amount Small 5/6/2019  4:00 PM   Drainage Description Serosanguineous 5/6/2019  4:00 PM   Non-staged Wound Description Full thickness 5/6/2019  4:00 PM   Treatments Cleansed;Pharmaceutical agent;Other (Comment) 5/6/2019  4:00 PM   Cleansing Normal Saline Irrigation 5/6/2019  4:00 PM   Periwound Protectant Skin Protectant wipes to Periwound 5/6/2019  4:00 PM   Dressing Options Honey Colloid;Adhesive Foam 5/6/2019  4:00 PM   Dressing Changed New 4/24/2019  9:15 AM   WOUND NURSE ONLY - Odor None 5/6/2019  4:00 PM   WOUND NURSE ONLY - Pulses 1+;DP 4/24/2019  9:15 AM   WOUND NURSE ONLY - Exposed Structures Other (Comments) 5/6/2019  4:00 PM   WOUND NURSE ONLY - Tissue Type and Percentage Pre: 80% yellow, 20% red 5/6/2019  4:00 PM          Pre-debridement  Photo          PROCEDURE  -2% viscous lidocaine applied topically to wound bed for approximately 5 minutes prior to debridement  -  4m curette used to debride wound bed..  Excisional debridement was performed to remove devitalized tissue until healthy, bleeding tissue was visualized.   Entire surface of wound, 0.3 cm2, debrided  Tissue debrided into the subcutaneous layer.   -Bleeding controlled with manual pressure   -Wound care completed by Jessika Strauss RN    Post-debridement Photo        PATIENT EDUCATION  -Advised to go to ER for any increased redness, swelling, drainage or odor, or if patient develops fever, chills, nausea or vomiting.  -Importance of adequate nutrition for wound healing  -Increase protein intake (unless contraindicated by renal status)    ASSESSMENT AND PLAN:   1. Open wound of right lower leg, subsequent encounter  Comments: Open wound due to Mohs procedure, nonhealing.    5/6: Initial provider assessment  -Excisional debridement in clinic today, medically necessary to promote wound healing  -Patient to return to clinic weekly for assessment debridement  -She is to change the dressing herself 1-2 times in between clinic visits.  -Follow-up with infectious diseases as scheduled   Wound care: Honey colloid to promote autolytic debridement and to manage bioburden, silicone foam cover dressing      2. Dermatitis    5/6: Irritation to periwound, presumably caused by allergic reaction to adhesive from Band-Aid placed by patient  -Luxiq foam to affected areas prior to placement of new dressing  -Silicone foam cover dressing  -Assessment each clinic visit    3. Leg edema, right    5/6: Nonpitting edema of right lower extremity.  Patient states that she wears support stockings  -Patient to continue wearing support stockings.    -Consider 2 layer compression if no progress with wound.    4. Squamous cell carcinoma    5/6: Diagnosis reported by patient.  -Request for records sent to patient's  dermatologist      Please note that this dictation was created using voice recognition software. I have worked with technical experts from [facility] to optimize the interface.  I have made every reasonable attempt to correct obvious errors, but there may be errors of grammar and possibly content that I did not discover before finalizing the note.   The patient is a 55y Female complaining of lower leg pain/injury.

## 2024-05-21 NOTE — PROCEDURE: BOTOX
Additional Area 3 Location: [place]
Darby FRANCIS CMA  
Additional Area 1 Location: [place]
Levator Labii Superioris Units: 0
Price (Use Numbers Only, No Special Characters Or $): 335
Post-Care Instructions: Patient instructed to not lie down for 4 hours after injections and limit physical activity for 24 hours.
Detail Level: Detailed
Additional Area 1 Units: 16
Consent: Verbal and written informed consent were obtained to include the following risks: pain, swelling, bruising, eyelid or eyebrow droop, and lack of visible improvement of wrinkles in the areas treated.  The skin was cleansed with alcohol. Injections were administered with a 32g needle into the following areas:
Additional Area 2 Units: 34
Additional Area 4 Location: Bunny lines
Dilution (U/0.1 Cc): 5
Reconstitution Date (Optional): 6/17/2019
Additional Area 6 Location: platysma
Additional Area 5 Location: perioral
Lot #: [id number]
Additional Area 2 Location: [place]
Expiration Date (Month Year): 01/2021

## 2025-04-08 NOTE — PROCEDURE: BENIGN DESTRUCTION
Mark is calling to see if his PCP would be agreeable to order him an Xray of his lower back on the right side. He thinks he pulled a muscle a few days ago and would like to get it checked out.  Please advise, thank you.    
Include Z78.9 (Other Specified Conditions Influencing Health Status) As An Associated Diagnosis?: No
Detail Level: Detailed
Consent: The patient's consent was obtained including but not limited to risks of crusting, scabbing, blistering, scarring, darker or lighter pigmentary change, recurrence, incomplete removal and infection.
Medical Necessity Clause: This procedure was medically necessary because the lesions that were treated were:
Medical Necessity Information: It is in your best interest to select a reason for this procedure from the list below. All of these items fulfill various CMS LCD requirements except the new and changing color options.
Anesthesia Volume In Cc: 0
Post-Care Instructions: I reviewed with the patient in detail post-care instructions. Patient is to wear sunprotection, and avoid picking at any of the treated lesions. Pt may apply Vaseline to crusted or scabbing areas.